# Patient Record
Sex: FEMALE | Race: BLACK OR AFRICAN AMERICAN | NOT HISPANIC OR LATINO | Employment: FULL TIME | URBAN - METROPOLITAN AREA
[De-identification: names, ages, dates, MRNs, and addresses within clinical notes are randomized per-mention and may not be internally consistent; named-entity substitution may affect disease eponyms.]

---

## 2023-03-14 DIAGNOSIS — K21.9 GASTROESOPHAGEAL REFLUX DISEASE WITHOUT ESOPHAGITIS: Primary | ICD-10-CM

## 2023-03-14 RX ORDER — PANTOPRAZOLE SODIUM 40 MG/1
TABLET, DELAYED RELEASE ORAL
COMMUNITY
End: 2023-03-14 | Stop reason: SDUPTHER

## 2023-03-14 RX ORDER — PANTOPRAZOLE SODIUM 40 MG/1
40 TABLET, DELAYED RELEASE ORAL DAILY
Qty: 90 TABLET | Refills: 0 | Status: SHIPPED | OUTPATIENT
Start: 2023-03-14 | End: 2023-06-01 | Stop reason: SDUPTHER

## 2023-03-16 ENCOUNTER — TELEMEDICINE (OUTPATIENT)
Dept: PHARMACY | Facility: HOSPITAL | Age: 53
End: 2023-03-16
Payer: COMMERCIAL

## 2023-03-16 DIAGNOSIS — E09.65: Primary | ICD-10-CM

## 2023-03-16 RX ORDER — AMLODIPINE BESYLATE 10 MG/1
10 TABLET ORAL DAILY
COMMUNITY
End: 2023-06-12 | Stop reason: ALTCHOICE

## 2023-03-16 RX ORDER — ALBUTEROL SULFATE 0.83 MG/ML
SOLUTION RESPIRATORY (INHALATION)
COMMUNITY
End: 2023-10-05 | Stop reason: SDUPTHER

## 2023-03-16 RX ORDER — BLOOD SUGAR DIAGNOSTIC
STRIP MISCELLANEOUS
COMMUNITY
Start: 2023-03-16 | End: 2024-01-09 | Stop reason: WASHOUT

## 2023-03-16 RX ORDER — ALBUTEROL SULFATE 90 UG/1
AEROSOL, METERED RESPIRATORY (INHALATION)
COMMUNITY
End: 2023-09-07 | Stop reason: SDUPTHER

## 2023-03-16 RX ORDER — MONTELUKAST SODIUM 10 MG/1
1 TABLET ORAL NIGHTLY
COMMUNITY
Start: 2022-05-23 | End: 2023-10-05 | Stop reason: SDUPTHER

## 2023-03-16 RX ORDER — INSULIN ASPART 100 [IU]/ML
40 INJECTION, SUSPENSION SUBCUTANEOUS
COMMUNITY
Start: 2023-03-16 | End: 2023-06-12 | Stop reason: ALTCHOICE

## 2023-03-16 RX ORDER — ROSUVASTATIN CALCIUM 20 MG/1
20 TABLET, COATED ORAL DAILY
COMMUNITY
End: 2023-06-01 | Stop reason: SDUPTHER

## 2023-03-16 RX ORDER — PEN NEEDLE, DIABETIC 31 GX5/16"
NEEDLE, DISPOSABLE MISCELLANEOUS
COMMUNITY
Start: 2023-03-16

## 2023-03-16 RX ORDER — LANCETS 30 GAUGE
EACH MISCELLANEOUS
COMMUNITY
Start: 2023-03-16 | End: 2024-01-23

## 2023-03-16 RX ORDER — FLUTICASONE PROPIONATE 50 MCG
1 SPRAY, SUSPENSION (ML) NASAL DAILY
COMMUNITY
Start: 2022-11-18 | End: 2023-06-12 | Stop reason: ALTCHOICE

## 2023-03-16 RX ORDER — FLUTICASONE PROPIONATE AND SALMETEROL 500; 50 UG/1; UG/1
1 POWDER RESPIRATORY (INHALATION)
COMMUNITY
Start: 2022-05-23 | End: 2023-10-05 | Stop reason: SDUPTHER

## 2023-03-16 RX ORDER — LANCETS 33 GAUGE
EACH MISCELLANEOUS
COMMUNITY
Start: 2023-03-16 | End: 2023-11-21 | Stop reason: ALTCHOICE

## 2023-03-16 NOTE — PROGRESS NOTES
"Edmond Cool is a 52 y.o. female was referred to Clinical Pharmacy Team to complete a comprehensive medication review (CMR) with a pharmacist as part of the [employer] diabetes program.  The patient was referred for their diabetes.     Referring Provider: David Skelton MD    Subjective   No Known Allergies    [facility] - [place], [place] - [address]  [address] [facility] [address]  [facility] [address]  Phone: [phone] Fax: [phone]      HPI    Objective     There were no vitals taken for this visit.     LAB  Lab Results   Component Value Date    BILITOT 0.4 03/12/2023    CALCIUM 8.3 (L) 03/16/2023    CO2 26 03/16/2023     03/16/2023    CREATININE 0.58 03/16/2023    GLUCOSE 151 (H) 03/16/2023    ALKPHOS 125 (H) 03/12/2023    K 3.9 03/16/2023    PROT 7.4 03/12/2023     03/16/2023    AST 23 03/12/2023    ALT 27 03/12/2023    BUN 13 03/16/2023    ANIONGAP 12 03/16/2023    MG 1.80 02/15/2023    PHOS 3.8 08/18/2020     (H) 03/11/2022    ALBUMIN 4.4 03/12/2023    GFRF >90 03/16/2023     Lab Results   Component Value Date    TRIG 184 (H) 12/09/2022    CHOL 229 (H) 12/09/2022    HDL 45.7 12/09/2022     Lab Results   Component Value Date    HGBA1C 10.6 (A) 03/15/2023       Current Outpatient Medications on File Prior to Visit   Medication Sig Dispense Refill    BD Ultra-Fine April Pen Needle 32 gauge x 5/32\" needle       fluticasone (Flonase) 50 mcg/actuation nasal spray Administer 1 spray into each nostril once daily.      fluticasone propion-salmeteroL (Advair Diskus) 500-50 mcg/dose diskus inhaler Inhale 1 puff  in the morning and 1 puff before bedtime.      montelukast (Singulair) 10 mg tablet Take 1 tablet (10 mg) by mouth once daily at bedtime.      NovoLOG Mix 70-30FlexPen U-100 100 unit/mL (70-30) injection Inject 40 Units under the skin in the morning and 40 Units in the evening. Inject with meals.      OneTouch Delica Plus " Lancet 33 gauge misc       OneTouch Ultra Test strip       OneTouch Ultra2 Meter misc       albuterol 2.5 mg /3 mL (0.083 %) nebulizer solution INHALE THE CONTENTS OF 1 UNIT DOSE BY NEBULIZATION ROUTE EVERY 4 TO 6 HOURS AS NEEDED FOR WHEEZE      albuterol 90 mcg/actuation inhaler INHALE 1 TO 2 PUFFS BY MOUTH EVERY 4 HOURS AS NEEDED FOR SHORTNESS OF BREATH / WHEEZING      amLODIPine (Norvasc) 10 mg tablet Take 1 tablet (10 mg) by mouth once daily.      pantoprazole (ProtoNix) 40 mg EC tablet Take 1 tablet (40 mg) by mouth once daily for 90 doses. Do not crush, chew, or split. 90 tablet 0    rosuvastatin (Crestor) 20 mg tablet Take 1 tablet (20 mg) by mouth once daily.      [DISCONTINUED] pantoprazole (ProtoNix) 40 mg EC tablet take 1 tablet by mouth every day       No current facility-administered medications on file prior to visit.      HISTORICAL PHARMACOTHERAPY  -None, patient diagnosed during inpatient visit ongoing.     DRUG INTERACTIONS  - No significant drug-drug interactions exist that require adjustment to therapy.     SECONDARY PREVENTION  - Statin? yes  - ACE-I/ARB? 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 Continue medications as prescribed.     Cam Patel, PharmD     Verbal consent to manage patient's drug therapy was obtained from [the patient and/or an individual authorized to act on behalf of a patient]. They were informed they may decline to participate or withdraw from participation in pharmacy services at any time.

## 2023-03-17 PROBLEM — G56.10 MEDIAN NERVE NEURITIS: Status: ACTIVE | Noted: 2023-03-17

## 2023-03-17 PROBLEM — J45.909 ASTHMA (HHS-HCC): Status: RESOLVED | Noted: 2023-03-17 | Resolved: 2023-03-17

## 2023-03-17 PROBLEM — R74.8 ELEVATED SERUM GAMMA-GLUTAMYL TRANSFERASE LEVEL: Status: ACTIVE | Noted: 2023-03-17

## 2023-03-17 PROBLEM — J06.9 UPPER RESPIRATORY INFECTION: Status: ACTIVE | Noted: 2023-03-17

## 2023-03-17 PROBLEM — T50.905A ADVERSE REACTION TO DRUG, INITIAL ENCOUNTER: Status: ACTIVE | Noted: 2023-03-17

## 2023-03-17 PROBLEM — R05.3 PERSISTENT COUGH: Status: ACTIVE | Noted: 2023-03-17

## 2023-03-17 PROBLEM — T63.91XA TOXIC EFFECT OF VENOM: Status: ACTIVE | Noted: 2023-03-17

## 2023-03-17 PROBLEM — R16.0 HEPATOMEGALY: Status: ACTIVE | Noted: 2023-03-17

## 2023-03-17 PROBLEM — R74.01 TRANSAMINITIS: Status: ACTIVE | Noted: 2023-03-17

## 2023-03-17 PROBLEM — N95.1 MENOPAUSAL SYMPTOMS: Status: ACTIVE | Noted: 2023-03-17

## 2023-03-17 PROBLEM — M25.561 RIGHT KNEE PAIN: Status: ACTIVE | Noted: 2023-03-17

## 2023-03-17 PROBLEM — J45.50 SEVERE PERSISTENT ASTHMA (MULTI): Status: ACTIVE | Noted: 2023-03-17

## 2023-03-17 PROBLEM — N92.6 IRREGULAR MENSTRUAL CYCLE: Status: ACTIVE | Noted: 2023-03-17

## 2023-03-17 PROBLEM — M25.811 SHOULDER IMPINGEMENT, RIGHT: Status: ACTIVE | Noted: 2023-03-17

## 2023-03-17 PROBLEM — R51.9 HEADACHE, TEMPORAL: Status: ACTIVE | Noted: 2023-03-17

## 2023-03-17 PROBLEM — F32.A ANXIETY AND DEPRESSION: Status: ACTIVE | Noted: 2023-03-17

## 2023-03-17 PROBLEM — M25.511 RIGHT SHOULDER PAIN: Status: ACTIVE | Noted: 2023-03-17

## 2023-03-17 PROBLEM — I10 BENIGN ESSENTIAL HYPERTENSION: Status: ACTIVE | Noted: 2023-03-17

## 2023-03-17 PROBLEM — K70.0 FATTY LIVER, ALCOHOLIC: Status: ACTIVE | Noted: 2023-03-17

## 2023-03-17 PROBLEM — R31.9 HEMATURIA: Status: ACTIVE | Noted: 2023-03-17

## 2023-03-17 PROBLEM — M75.81 TENDINITIS OF RIGHT ROTATOR CUFF: Status: ACTIVE | Noted: 2023-03-17

## 2023-03-17 PROBLEM — J32.9 SINUS INFECTION: Status: ACTIVE | Noted: 2023-03-17

## 2023-03-17 PROBLEM — E78.49 ESSENTIAL FAMILIAL HYPERLIPIDEMIA: Status: ACTIVE | Noted: 2023-03-17

## 2023-03-17 PROBLEM — S46.011A STRAIN OF RIGHT ROTATOR CUFF CAPSULE: Status: ACTIVE | Noted: 2023-03-17

## 2023-03-17 PROBLEM — J45.909 ASTHMA (HHS-HCC): Status: ACTIVE | Noted: 2023-03-17

## 2023-03-17 PROBLEM — E55.9 VITAMIN D DEFICIENCY: Status: ACTIVE | Noted: 2023-03-17

## 2023-03-17 PROBLEM — J30.9 ALLERGIC RHINITIS: Status: ACTIVE | Noted: 2023-03-17

## 2023-03-17 PROBLEM — B96.89 BACTERIAL SINUSITIS: Status: ACTIVE | Noted: 2023-03-17

## 2023-03-17 PROBLEM — M79.642 PAIN OF LEFT HAND: Status: ACTIVE | Noted: 2023-03-17

## 2023-03-17 PROBLEM — H52.13 MYOPIA WITH PRESBYOPIA OF BOTH EYES: Status: ACTIVE | Noted: 2023-03-17

## 2023-03-17 PROBLEM — J45.901 ASTHMATIC BRONCHITIS WITH EXACERBATION (HHS-HCC): Status: ACTIVE | Noted: 2023-03-17

## 2023-03-17 PROBLEM — J31.0 NON-ALLERGIC RHINITIS: Status: ACTIVE | Noted: 2023-03-17

## 2023-03-17 PROBLEM — M54.31 SCIATICA OF RIGHT SIDE: Status: ACTIVE | Noted: 2023-03-17

## 2023-03-17 PROBLEM — N89.8 VAGINAL DISCHARGE: Status: ACTIVE | Noted: 2023-03-17

## 2023-03-17 PROBLEM — E11.9 DIABETES MELLITUS (MULTI): Status: ACTIVE | Noted: 2023-03-17

## 2023-03-17 PROBLEM — H52.4 MYOPIA WITH PRESBYOPIA OF BOTH EYES: Status: ACTIVE | Noted: 2023-03-17

## 2023-03-17 PROBLEM — M77.8 EXTENSOR TENDINITIS OF HAND: Status: ACTIVE | Noted: 2023-03-17

## 2023-03-17 PROBLEM — H53.8 BLURRY VISION: Status: ACTIVE | Noted: 2023-03-17

## 2023-03-17 PROBLEM — F41.9 ANXIETY AND DEPRESSION: Status: ACTIVE | Noted: 2023-03-17

## 2023-03-17 PROBLEM — F10.21 HISTORY OF ALCOHOLISM (MULTI): Status: ACTIVE | Noted: 2023-03-17

## 2023-03-17 PROBLEM — M75.21 TENDINITIS OF LONG HEAD OF BICEPS BRACHII OF RIGHT SHOULDER: Status: ACTIVE | Noted: 2023-03-17

## 2023-03-17 PROBLEM — K21.9 GERD WITHOUT ESOPHAGITIS: Status: ACTIVE | Noted: 2023-03-17

## 2023-03-17 PROBLEM — R06.02 SHORTNESS OF BREATH: Status: ACTIVE | Noted: 2023-03-17

## 2023-03-17 RX ORDER — MINERAL OIL
180 ENEMA (ML) RECTAL DAILY PRN
COMMUNITY
End: 2023-04-24 | Stop reason: ALTCHOICE

## 2023-03-17 RX ORDER — AZELASTINE HYDROCHLORIDE, FLUTICASONE PROPIONATE 137; 50 UG/1; UG/1
1 SPRAY, METERED NASAL 2 TIMES DAILY
COMMUNITY
End: 2023-06-12 | Stop reason: ALTCHOICE

## 2023-03-17 RX ORDER — MULTIVIT-MIN/IRON/FOLIC ACID/K 18-600-40
1 CAPSULE ORAL DAILY
COMMUNITY
End: 2023-06-12 | Stop reason: ALTCHOICE

## 2023-03-20 NOTE — PROGRESS NOTES
I reviewed the progress note and agree with the resident’s findings and plans as written. Case discussed with resident.    John He, PharmD

## 2023-03-21 ENCOUNTER — APPOINTMENT (OUTPATIENT)
Dept: PRIMARY CARE | Facility: CLINIC | Age: 53
End: 2023-03-21
Payer: COMMERCIAL

## 2023-03-24 ENCOUNTER — OFFICE VISIT (OUTPATIENT)
Dept: PRIMARY CARE | Facility: CLINIC | Age: 53
End: 2023-03-24
Payer: COMMERCIAL

## 2023-03-24 VITALS
WEIGHT: 163.6 LBS | HEART RATE: 79 BPM | DIASTOLIC BLOOD PRESSURE: 82 MMHG | HEIGHT: 61 IN | SYSTOLIC BLOOD PRESSURE: 127 MMHG | BODY MASS INDEX: 30.89 KG/M2

## 2023-03-24 DIAGNOSIS — E11.9 TYPE 2 DIABETES MELLITUS WITHOUT COMPLICATION, WITHOUT LONG-TERM CURRENT USE OF INSULIN (MULTI): ICD-10-CM

## 2023-03-24 DIAGNOSIS — H10.33 ACUTE CONJUNCTIVITIS OF BOTH EYES, UNSPECIFIED ACUTE CONJUNCTIVITIS TYPE: Primary | ICD-10-CM

## 2023-03-24 DIAGNOSIS — I10 ESSENTIAL HYPERTENSION, BENIGN: ICD-10-CM

## 2023-03-24 PROCEDURE — 3008F BODY MASS INDEX DOCD: CPT | Performed by: FAMILY MEDICINE

## 2023-03-24 PROCEDURE — 3074F SYST BP LT 130 MM HG: CPT | Performed by: FAMILY MEDICINE

## 2023-03-24 PROCEDURE — 3079F DIAST BP 80-89 MM HG: CPT | Performed by: FAMILY MEDICINE

## 2023-03-24 PROCEDURE — 3046F HEMOGLOBIN A1C LEVEL >9.0%: CPT | Performed by: FAMILY MEDICINE

## 2023-03-24 PROCEDURE — 1036F TOBACCO NON-USER: CPT | Performed by: FAMILY MEDICINE

## 2023-03-24 PROCEDURE — 99214 OFFICE O/P EST MOD 30 MIN: CPT | Performed by: FAMILY MEDICINE

## 2023-03-24 RX ORDER — TOBRAMYCIN AND DEXAMETHASONE 3; 1 MG/ML; MG/ML
1 SUSPENSION/ DROPS OPHTHALMIC
Qty: 5 ML | Refills: 0 | Status: SHIPPED | OUTPATIENT
Start: 2023-03-24 | End: 2023-04-03

## 2023-03-24 ASSESSMENT — ENCOUNTER SYMPTOMS
PHOTOPHOBIA: 1
RESPIRATORY NEGATIVE: 1
NEUROLOGICAL NEGATIVE: 1
GASTROINTESTINAL NEGATIVE: 1
EYE ITCHING: 1
CARDIOVASCULAR NEGATIVE: 1
EYE REDNESS: 1
MUSCULOSKELETAL NEGATIVE: 1
EYE PAIN: 1
CONSTITUTIONAL NEGATIVE: 1

## 2023-03-24 NOTE — PROGRESS NOTES
Pt is here for hospital follow up 2/15/23  Then again 3/12-3/18   Pt had double pneumonia   Both eyes are red and swollen.

## 2023-04-24 ENCOUNTER — OFFICE VISIT (OUTPATIENT)
Dept: PRIMARY CARE | Facility: CLINIC | Age: 53
End: 2023-04-24
Payer: COMMERCIAL

## 2023-04-24 VITALS
WEIGHT: 158 LBS | BODY MASS INDEX: 29.83 KG/M2 | SYSTOLIC BLOOD PRESSURE: 117 MMHG | HEIGHT: 61 IN | DIASTOLIC BLOOD PRESSURE: 78 MMHG | HEART RATE: 90 BPM

## 2023-04-24 DIAGNOSIS — E11.9 TYPE 2 DIABETES MELLITUS WITHOUT COMPLICATION, WITHOUT LONG-TERM CURRENT USE OF INSULIN (MULTI): ICD-10-CM

## 2023-04-24 DIAGNOSIS — E78.2 MIXED HYPERLIPIDEMIA: ICD-10-CM

## 2023-04-24 DIAGNOSIS — I10 BENIGN ESSENTIAL HYPERTENSION: Primary | ICD-10-CM

## 2023-04-24 PROBLEM — R31.9 HEMATURIA: Status: RESOLVED | Noted: 2023-03-17 | Resolved: 2023-04-24

## 2023-04-24 PROBLEM — B96.89 BACTERIAL SINUSITIS: Status: RESOLVED | Noted: 2023-03-17 | Resolved: 2023-04-24

## 2023-04-24 PROBLEM — R06.2 WHEEZING: Status: ACTIVE | Noted: 2023-02-15

## 2023-04-24 PROBLEM — E66.9 OBESITY, UNSPECIFIED: Status: ACTIVE | Noted: 2023-03-18

## 2023-04-24 PROBLEM — E78.5 HYPERLIPIDEMIA: Status: ACTIVE | Noted: 2023-03-18

## 2023-04-24 PROBLEM — J06.9 UPPER RESPIRATORY INFECTION: Status: RESOLVED | Noted: 2023-03-17 | Resolved: 2023-04-24

## 2023-04-24 PROBLEM — R42 VERTIGO: Status: ACTIVE | Noted: 2017-07-06

## 2023-04-24 PROBLEM — G47.00 INSOMNIA: Status: ACTIVE | Noted: 2019-03-24

## 2023-04-24 PROBLEM — J45.901 PERSISTENT ASTHMA WITH ACUTE EXACERBATION (HHS-HCC): Status: ACTIVE | Noted: 2023-03-01

## 2023-04-24 PROBLEM — M51.26 OTHER INTERVERTEBRAL DISC DISPLACEMENT, LUMBAR REGION: Status: ACTIVE | Noted: 2023-03-18

## 2023-04-24 PROBLEM — I71.40 ABDOMINAL AORTIC ANEURYSM (CMS-HCC): Status: ACTIVE | Noted: 2023-04-24

## 2023-04-24 PROBLEM — J32.9 BACTERIAL SINUSITIS: Status: RESOLVED | Noted: 2023-03-17 | Resolved: 2023-04-24

## 2023-04-24 PROBLEM — N89.8 VAGINAL DISCHARGE: Status: RESOLVED | Noted: 2023-03-17 | Resolved: 2023-04-24

## 2023-04-24 PROCEDURE — 1036F TOBACCO NON-USER: CPT | Performed by: FAMILY MEDICINE

## 2023-04-24 PROCEDURE — 3046F HEMOGLOBIN A1C LEVEL >9.0%: CPT | Performed by: FAMILY MEDICINE

## 2023-04-24 PROCEDURE — 99214 OFFICE O/P EST MOD 30 MIN: CPT | Performed by: FAMILY MEDICINE

## 2023-04-24 PROCEDURE — 3074F SYST BP LT 130 MM HG: CPT | Performed by: FAMILY MEDICINE

## 2023-04-24 PROCEDURE — 3078F DIAST BP <80 MM HG: CPT | Performed by: FAMILY MEDICINE

## 2023-04-24 PROCEDURE — 3008F BODY MASS INDEX DOCD: CPT | Performed by: FAMILY MEDICINE

## 2023-04-24 RX ORDER — INSULIN DEGLUDEC 100 U/ML
INJECTION, SOLUTION SUBCUTANEOUS
COMMUNITY
End: 2023-06-12 | Stop reason: ALTCHOICE

## 2023-04-24 RX ORDER — EMPAGLIFLOZIN 10 MG/1
TABLET, FILM COATED ORAL
COMMUNITY
End: 2023-06-12 | Stop reason: ALTCHOICE

## 2023-04-24 RX ORDER — SEMAGLUTIDE 0.68 MG/ML
INJECTION, SOLUTION SUBCUTANEOUS
COMMUNITY
End: 2023-11-17 | Stop reason: SDUPTHER

## 2023-04-24 ASSESSMENT — ENCOUNTER SYMPTOMS
CARDIOVASCULAR NEGATIVE: 1
CONSTITUTIONAL NEGATIVE: 1
NEUROLOGICAL NEGATIVE: 1
MUSCULOSKELETAL NEGATIVE: 1
GASTROINTESTINAL NEGATIVE: 1
RESPIRATORY NEGATIVE: 1

## 2023-04-24 NOTE — PROGRESS NOTES
Pt comes in for 1 mo fu on bp. Pt has been off her bp meds for about a week now. Pt also states her endo is going to monitor her cholesterol levels.

## 2023-04-24 NOTE — PROGRESS NOTES
"Subjective   Patient ID: Edmond Cool is a 52 y.o. female who presents for No chief complaint on file..    HPI fu htn, dm, chol,, iss seeing endocrine and sugars very good    Review of Systems   Constitutional: Negative.    HENT: Negative.     Respiratory: Negative.     Cardiovascular: Negative.    Gastrointestinal: Negative.    Musculoskeletal: Negative.    Neurological: Negative.        Objective   /78   Pulse 90   Ht 1.549 m (5' 1\")   Wt 71.7 kg (158 lb)   BMI 29.85 kg/m²     Physical Exam  Vitals reviewed.   Constitutional:       Appearance: Normal appearance. She is normal weight.   Eyes:      Extraocular Movements: Extraocular movements intact.      Conjunctiva/sclera: Conjunctivae normal.      Pupils: Pupils are equal, round, and reactive to light.   Cardiovascular:      Rate and Rhythm: Normal rate and regular rhythm.      Pulses: Normal pulses.      Heart sounds: Normal heart sounds.   Pulmonary:      Effort: Pulmonary effort is normal.      Breath sounds: Normal breath sounds.   Abdominal:      General: Bowel sounds are normal.      Palpations: Abdomen is soft.   Musculoskeletal:         General: Normal range of motion.   Skin:     General: Skin is warm and dry.   Neurological:      General: No focal deficit present.      Mental Status: She is alert and oriented to person, place, and time. Mental status is at baseline.         Assessment/Plan   Problem List Items Addressed This Visit          Circulatory    Benign essential hypertension - Primary       Endocrine/Metabolic    Diabetes mellitus (CMS/HCC)       Other    Hyperlipidemia          "

## 2023-04-26 LAB
ALBUMIN (MG/L) IN URINE: <7 MG/L
ALBUMIN/CREATININE (UG/MG) IN URINE: NORMAL UG/MG CRT (ref 0–30)
CREATININE (MG/DL) IN URINE: 59.9 MG/DL (ref 20–320)

## 2023-05-09 NOTE — PROGRESS NOTES
"Subjective   Patient ID: Edmond Cool is a 52 y.o. female who presents for No chief complaint on file..    HPI fu hosp for pneumonia and dx w diabetes new onset a1c 10.6 pt sent home on 70/30 insulin 30am and 30 pm    Review of Systems   Constitutional: Negative.    HENT: Negative.     Eyes:  Positive for photophobia, pain, redness and itching.   Respiratory: Negative.     Cardiovascular: Negative.    Gastrointestinal: Negative.    Musculoskeletal: Negative.    Neurological: Negative.      Having low bs in night time, bilat conj inj and purtitis w no decrease in vision , breathin g is imp  Objective   /82   Pulse 79   Ht 1.549 m (5' 1\")   Wt 74.2 kg (163 lb 9.6 oz)   BMI 30.91 kg/m²     Physical Exam  Vitals reviewed.   Constitutional:       Appearance: Normal appearance. She is normal weight.   Eyes:      Extraocular Movements: Extraocular movements intact.      Pupils: Pupils are equal, round, and reactive to light.      Comments: Bilat eye erythema and watery discharge, intact pupillary reflex and eomi   Cardiovascular:      Rate and Rhythm: Normal rate and regular rhythm.      Pulses: Normal pulses.      Heart sounds: Normal heart sounds.   Pulmonary:      Effort: Pulmonary effort is normal.      Breath sounds: Normal breath sounds.   Abdominal:      General: Bowel sounds are normal.      Palpations: Abdomen is soft.   Musculoskeletal:         General: Normal range of motion.   Skin:     General: Skin is warm and dry.   Neurological:      General: No focal deficit present.      Mental Status: She is alert and oriented to person, place, and time. Mental status is at baseline.         Assessment/Plan   Problem List Items Addressed This Visit          Endocrine/Metabolic    Diabetes mellitus (CMS/[facility])     Other Visit Diagnoses       Acute conjunctivitis of both eyes, unspecified acute conjunctivitis type    -  Primary    Relevant Medications    tobramycin-dexamethasone (Tobradex) ophthalmic " Spoke to pt. Advised Dr Carty would like pt to follow up for imaging review and clinical breast exam. Pt has been scheduled 6/02 at 9:30am.    suspension    Essential hypertension, benign              Dm will decrease pm dose from 30 to 20 and call w sugars over weekend,   Will be getting in w endo for eval   Conj inj start medication and call w symptoms on Monday , if any change in visual clarity or fever chills and disch will need to go to er

## 2023-06-01 DIAGNOSIS — K21.9 GASTROESOPHAGEAL REFLUX DISEASE WITHOUT ESOPHAGITIS: ICD-10-CM

## 2023-06-01 RX ORDER — PANTOPRAZOLE SODIUM 40 MG/1
40 TABLET, DELAYED RELEASE ORAL DAILY
Qty: 90 TABLET | Refills: 0 | Status: SHIPPED | OUTPATIENT
Start: 2023-06-01 | End: 2023-09-07 | Stop reason: SDUPTHER

## 2023-06-01 RX ORDER — ROSUVASTATIN CALCIUM 20 MG/1
20 TABLET, COATED ORAL DAILY
Qty: 90 TABLET | Refills: 0 | Status: SHIPPED | OUTPATIENT
Start: 2023-06-01 | End: 2023-06-26

## 2023-06-12 ENCOUNTER — OFFICE VISIT (OUTPATIENT)
Dept: PRIMARY CARE | Facility: CLINIC | Age: 53
End: 2023-06-12
Payer: COMMERCIAL

## 2023-06-12 VITALS
HEART RATE: 75 BPM | WEIGHT: 150 LBS | SYSTOLIC BLOOD PRESSURE: 100 MMHG | BODY MASS INDEX: 28.32 KG/M2 | HEIGHT: 61 IN | DIASTOLIC BLOOD PRESSURE: 68 MMHG

## 2023-06-12 DIAGNOSIS — K59.04 CHRONIC IDIOPATHIC CONSTIPATION: ICD-10-CM

## 2023-06-12 DIAGNOSIS — E11.69 TYPE 2 DIABETES MELLITUS WITH OTHER SPECIFIED COMPLICATION, UNSPECIFIED WHETHER LONG TERM INSULIN USE (MULTI): ICD-10-CM

## 2023-06-12 DIAGNOSIS — H81.93 VESTIBULAR DYSFUNCTION OF BOTH EARS: ICD-10-CM

## 2023-06-12 DIAGNOSIS — Z12.11 COLON CANCER SCREENING: Primary | ICD-10-CM

## 2023-06-12 PROBLEM — E11.65 UNCONTROLLED TYPE 2 DIABETES MELLITUS WITH HYPERGLYCEMIA, WITHOUT LONG-TERM CURRENT USE OF INSULIN (MULTI): Status: ACTIVE | Noted: 2023-06-12

## 2023-06-12 LAB — POC HEMOGLOBIN A1C: 6 % (ref 4.2–6.5)

## 2023-06-12 PROCEDURE — 1036F TOBACCO NON-USER: CPT | Performed by: FAMILY MEDICINE

## 2023-06-12 PROCEDURE — 3046F HEMOGLOBIN A1C LEVEL >9.0%: CPT | Performed by: FAMILY MEDICINE

## 2023-06-12 PROCEDURE — 3074F SYST BP LT 130 MM HG: CPT | Performed by: FAMILY MEDICINE

## 2023-06-12 PROCEDURE — 3078F DIAST BP <80 MM HG: CPT | Performed by: FAMILY MEDICINE

## 2023-06-12 PROCEDURE — 99214 OFFICE O/P EST MOD 30 MIN: CPT | Performed by: FAMILY MEDICINE

## 2023-06-12 PROCEDURE — 3008F BODY MASS INDEX DOCD: CPT | Performed by: FAMILY MEDICINE

## 2023-06-12 PROCEDURE — 83036 HEMOGLOBIN GLYCOSYLATED A1C: CPT | Performed by: FAMILY MEDICINE

## 2023-06-12 RX ORDER — ONDANSETRON 8 MG/1
8 TABLET, ORALLY DISINTEGRATING ORAL EVERY 8 HOURS PRN
Qty: 20 TABLET | Refills: 0 | Status: SHIPPED | OUTPATIENT
Start: 2023-06-12 | End: 2023-06-19

## 2023-06-12 RX ORDER — MECLIZINE HYDROCHLORIDE 25 MG/1
25 TABLET ORAL 3 TIMES DAILY PRN
Qty: 30 TABLET | Refills: 0 | Status: SHIPPED | OUTPATIENT
Start: 2023-06-12 | End: 2023-09-07 | Stop reason: ALTCHOICE

## 2023-06-12 ASSESSMENT — ENCOUNTER SYMPTOMS
NEUROLOGICAL NEGATIVE: 1
CONSTITUTIONAL NEGATIVE: 1
CARDIOVASCULAR NEGATIVE: 1
MUSCULOSKELETAL NEGATIVE: 1
GASTROINTESTINAL NEGATIVE: 1
RESPIRATORY NEGATIVE: 1

## 2023-06-12 NOTE — PROGRESS NOTES
Pt comes in for constipation issues. Pt has tried otc stuff, prune juice, laxatives, senna, and colace and not working. Pt also goes to the gym 5 days a week and when she lays down she is getting nauseated and feels sick. Pt states it is a different feeling then vertigo.

## 2023-06-12 NOTE — PROGRESS NOTES
"Subjective   Patient ID: Edmond Cool is a 52 y.o. female who presents for No chief complaint on file..    HPI constipation, vestibular dysequilibrium, dm     Review of Systems   Constitutional: Negative.    HENT: Negative.     Respiratory: Negative.     Cardiovascular: Negative.    Gastrointestinal: Negative.    Musculoskeletal: Negative.    Neurological: Negative.        Objective   /68   Pulse 75   Ht 1.549 m (5' 1\")   Wt 68 kg (150 lb)   BMI 28.34 kg/m²     Physical Exam  Vitals reviewed.   Constitutional:       Appearance: Normal appearance. She is normal weight.   Eyes:      Extraocular Movements: Extraocular movements intact.      Conjunctiva/sclera: Conjunctivae normal.      Pupils: Pupils are equal, round, and reactive to light.   Cardiovascular:      Rate and Rhythm: Normal rate and regular rhythm.      Pulses: Normal pulses.      Heart sounds: Normal heart sounds.   Pulmonary:      Effort: Pulmonary effort is normal.      Breath sounds: Normal breath sounds.   Abdominal:      General: Bowel sounds are normal.      Palpations: Abdomen is soft.   Musculoskeletal:         General: Normal range of motion.   Skin:     General: Skin is warm and dry.   Neurological:      General: No focal deficit present.      Mental Status: She is alert and oriented to person, place, and time. Mental status is at baseline.         Assessment/Plan   Problem List Items Addressed This Visit          Endocrine/Metabolic    Diabetes mellitus (CMS/HCC)     Other Visit Diagnoses       Colon cancer screening    -  Primary    Chronic idiopathic constipation                   "

## 2023-07-19 DIAGNOSIS — K59.04 CHRONIC IDIOPATHIC CONSTIPATION: Primary | ICD-10-CM

## 2023-07-24 DIAGNOSIS — K59.04 CHRONIC IDIOPATHIC CONSTIPATION: Primary | ICD-10-CM

## 2023-08-01 DIAGNOSIS — K59.04 CHRONIC IDIOPATHIC CONSTIPATION: ICD-10-CM

## 2023-08-24 ENCOUNTER — APPOINTMENT (OUTPATIENT)
Dept: PRIMARY CARE | Facility: CLINIC | Age: 53
End: 2023-08-24
Payer: COMMERCIAL

## 2023-08-25 ENCOUNTER — APPOINTMENT (OUTPATIENT)
Dept: PRIMARY CARE | Facility: CLINIC | Age: 53
End: 2023-08-25
Payer: COMMERCIAL

## 2023-08-30 ENCOUNTER — LAB (OUTPATIENT)
Dept: LAB | Facility: LAB | Age: 53
End: 2023-08-30
Payer: COMMERCIAL

## 2023-08-30 ENCOUNTER — APPOINTMENT (OUTPATIENT)
Dept: LAB | Facility: LAB | Age: 53
End: 2023-08-30
Payer: COMMERCIAL

## 2023-09-07 ENCOUNTER — OFFICE VISIT (OUTPATIENT)
Dept: PRIMARY CARE | Facility: CLINIC | Age: 53
End: 2023-09-07
Payer: COMMERCIAL

## 2023-09-07 VITALS
BODY MASS INDEX: 28.13 KG/M2 | HEIGHT: 61 IN | DIASTOLIC BLOOD PRESSURE: 78 MMHG | SYSTOLIC BLOOD PRESSURE: 130 MMHG | WEIGHT: 149 LBS | HEART RATE: 76 BPM

## 2023-09-07 DIAGNOSIS — M62.838 CERVICAL PARASPINAL MUSCLE SPASM: Primary | ICD-10-CM

## 2023-09-07 DIAGNOSIS — J45.21 MILD INTERMITTENT ASTHMATIC BRONCHITIS WITH ACUTE EXACERBATION (HHS-HCC): ICD-10-CM

## 2023-09-07 DIAGNOSIS — M54.2 NECK PAIN, CHRONIC: ICD-10-CM

## 2023-09-07 DIAGNOSIS — K21.9 GASTROESOPHAGEAL REFLUX DISEASE WITHOUT ESOPHAGITIS: ICD-10-CM

## 2023-09-07 DIAGNOSIS — G89.29 NECK PAIN, CHRONIC: ICD-10-CM

## 2023-09-07 PROBLEM — Z86.39 HISTORY OF HYPERCHOLESTEROLEMIA: Status: ACTIVE | Noted: 2023-09-07

## 2023-09-07 PROBLEM — K59.09 CHRONIC CONSTIPATION: Status: ACTIVE | Noted: 2023-09-07

## 2023-09-07 PROCEDURE — 99213 OFFICE O/P EST LOW 20 MIN: CPT | Performed by: FAMILY MEDICINE

## 2023-09-07 PROCEDURE — 3044F HG A1C LEVEL LT 7.0%: CPT | Performed by: FAMILY MEDICINE

## 2023-09-07 PROCEDURE — 3075F SYST BP GE 130 - 139MM HG: CPT | Performed by: FAMILY MEDICINE

## 2023-09-07 PROCEDURE — 3008F BODY MASS INDEX DOCD: CPT | Performed by: FAMILY MEDICINE

## 2023-09-07 PROCEDURE — 3078F DIAST BP <80 MM HG: CPT | Performed by: FAMILY MEDICINE

## 2023-09-07 PROCEDURE — 1036F TOBACCO NON-USER: CPT | Performed by: FAMILY MEDICINE

## 2023-09-07 RX ORDER — PANTOPRAZOLE SODIUM 40 MG/1
40 TABLET, DELAYED RELEASE ORAL DAILY
Qty: 90 TABLET | Refills: 0 | Status: SHIPPED | OUTPATIENT
Start: 2023-09-07 | End: 2023-09-07

## 2023-09-07 RX ORDER — ROSUVASTATIN CALCIUM 20 MG/1
20 TABLET, COATED ORAL DAILY
Qty: 90 TABLET | Refills: 1 | Status: SHIPPED | OUTPATIENT
Start: 2023-09-07 | End: 2023-09-07

## 2023-09-07 RX ORDER — TIZANIDINE HYDROCHLORIDE 4 MG/1
4 CAPSULE, GELATIN COATED ORAL NIGHTLY PRN
Qty: 10 CAPSULE | Refills: 0 | Status: SHIPPED | OUTPATIENT
Start: 2023-09-07 | End: 2023-09-07

## 2023-09-07 RX ORDER — PREDNISONE 20 MG/1
20 TABLET ORAL 2 TIMES DAILY
Qty: 10 TABLET | Refills: 0 | Status: SHIPPED | OUTPATIENT
Start: 2023-09-07 | End: 2023-09-07

## 2023-09-07 RX ORDER — ALBUTEROL SULFATE 90 UG/1
2 AEROSOL, METERED RESPIRATORY (INHALATION) EVERY 4 HOURS PRN
Qty: 18 G | Refills: 2 | Status: SHIPPED | OUTPATIENT
Start: 2023-09-07 | End: 2023-09-07

## 2023-09-07 RX ORDER — ISOPROPYL ALCOHOL 70 ML/100ML
SWAB TOPICAL
COMMUNITY
End: 2023-11-21 | Stop reason: WASHOUT

## 2023-09-07 ASSESSMENT — ENCOUNTER SYMPTOMS
NEUROLOGICAL NEGATIVE: 1
MUSCULOSKELETAL NEGATIVE: 1
RESPIRATORY NEGATIVE: 1
CARDIOVASCULAR NEGATIVE: 1
GASTROINTESTINAL NEGATIVE: 1
CONSTITUTIONAL NEGATIVE: 1

## 2023-09-07 NOTE — PROGRESS NOTES
Pt comes in for fu. Pt just saw her endo in July and will go back in December. Pt has some shoulder pain and got a luis a shot yesterday from ortho. Pt was at the urgent care a couple weeks ago for sinus inf and finished antibiotics.

## 2023-09-07 NOTE — PROGRESS NOTES
"Subjective   Patient ID: Edmond Cool is a 52 y.o. female who presents for No chief complaint on file..    HPI cervicle disc dis    Review of Systems   Constitutional: Negative.    HENT: Negative.     Respiratory: Negative.     Cardiovascular: Negative.    Gastrointestinal: Negative.    Musculoskeletal: Negative.    Neurological: Negative.        Objective   /78   Pulse 76   Ht 1.549 m (5' 1\")   Wt 67.6 kg (149 lb)   BMI 28.15 kg/m²     Physical Exam  Vitals reviewed.   Constitutional:       Appearance: Normal appearance. She is normal weight.   Eyes:      Extraocular Movements: Extraocular movements intact.      Conjunctiva/sclera: Conjunctivae normal.      Pupils: Pupils are equal, round, and reactive to light.   Cardiovascular:      Rate and Rhythm: Normal rate and regular rhythm.      Pulses: Normal pulses.      Heart sounds: Normal heart sounds.   Pulmonary:      Effort: Pulmonary effort is normal.      Breath sounds: Normal breath sounds.   Abdominal:      General: Bowel sounds are normal.      Palpations: Abdomen is soft.   Musculoskeletal:         General: Normal range of motion.   Skin:     General: Skin is warm and dry.   Neurological:      General: No focal deficit present.      Mental Status: She is alert and oriented to person, place, and time. Mental status is at baseline.         Assessment/Plan   Problem List Items Addressed This Visit       Asthmatic bronchitis with exacerbation    Relevant Medications    albuterol 90 mcg/actuation inhaler     Other Visit Diagnoses       Cervical paraspinal muscle spasm    -  Primary    Relevant Medications    predniSONE (Deltasone) 20 mg tablet    tiZANidine (Zanaflex) 4 mg capsule    Gastroesophageal reflux disease without esophagitis        Relevant Medications    pantoprazole (ProtoNix) 40 mg EC tablet    rosuvastatin (Crestor) 20 mg tablet    Neck pain, chronic                   "

## 2023-09-11 ENCOUNTER — TELEPHONE (OUTPATIENT)
Dept: PRIMARY CARE | Facility: CLINIC | Age: 53
End: 2023-09-11
Payer: COMMERCIAL

## 2023-09-20 LAB — SARS-COV-2 RESULT: DETECTED

## 2023-10-05 ENCOUNTER — PHARMACY VISIT (OUTPATIENT)
Dept: PHARMACY | Facility: CLINIC | Age: 53
End: 2023-10-05
Payer: COMMERCIAL

## 2023-10-05 ENCOUNTER — OFFICE VISIT (OUTPATIENT)
Dept: PULMONOLOGY | Facility: CLINIC | Age: 53
End: 2023-10-05
Payer: COMMERCIAL

## 2023-10-05 VITALS
RESPIRATION RATE: 20 BRPM | DIASTOLIC BLOOD PRESSURE: 95 MMHG | BODY MASS INDEX: 27.74 KG/M2 | SYSTOLIC BLOOD PRESSURE: 161 MMHG | WEIGHT: 146.8 LBS | OXYGEN SATURATION: 100 % | HEART RATE: 70 BPM | TEMPERATURE: 97.6 F

## 2023-10-05 DIAGNOSIS — R05.3 PERSISTENT COUGH: ICD-10-CM

## 2023-10-05 DIAGNOSIS — J45.41 MODERATE PERSISTENT ASTHMATIC BRONCHITIS WITH ACUTE EXACERBATION (HHS-HCC): ICD-10-CM

## 2023-10-05 DIAGNOSIS — J30.9 ALLERGIC RHINITIS, UNSPECIFIED SEASONALITY, UNSPECIFIED TRIGGER: Primary | ICD-10-CM

## 2023-10-05 PROCEDURE — 3080F DIAST BP >= 90 MM HG: CPT | Performed by: STUDENT IN AN ORGANIZED HEALTH CARE EDUCATION/TRAINING PROGRAM

## 2023-10-05 PROCEDURE — 3077F SYST BP >= 140 MM HG: CPT | Performed by: STUDENT IN AN ORGANIZED HEALTH CARE EDUCATION/TRAINING PROGRAM

## 2023-10-05 PROCEDURE — 1036F TOBACCO NON-USER: CPT | Performed by: STUDENT IN AN ORGANIZED HEALTH CARE EDUCATION/TRAINING PROGRAM

## 2023-10-05 PROCEDURE — RXMED WILLOW AMBULATORY MEDICATION CHARGE

## 2023-10-05 PROCEDURE — 99214 OFFICE O/P EST MOD 30 MIN: CPT | Performed by: STUDENT IN AN ORGANIZED HEALTH CARE EDUCATION/TRAINING PROGRAM

## 2023-10-05 PROCEDURE — 3044F HG A1C LEVEL LT 7.0%: CPT | Performed by: STUDENT IN AN ORGANIZED HEALTH CARE EDUCATION/TRAINING PROGRAM

## 2023-10-05 PROCEDURE — 3008F BODY MASS INDEX DOCD: CPT | Performed by: STUDENT IN AN ORGANIZED HEALTH CARE EDUCATION/TRAINING PROGRAM

## 2023-10-05 RX ORDER — FLUTICASONE PROPIONATE AND SALMETEROL 500; 50 UG/1; UG/1
1 POWDER RESPIRATORY (INHALATION)
Qty: 60 EACH | Refills: 3 | Status: SHIPPED | OUTPATIENT
Start: 2023-10-05

## 2023-10-05 RX ORDER — ALBUTEROL SULFATE 0.83 MG/ML
2.5 SOLUTION RESPIRATORY (INHALATION) EVERY 6 HOURS PRN
Qty: 1080 ML | Refills: 3 | Status: SHIPPED | OUTPATIENT
Start: 2023-10-05

## 2023-10-05 RX ORDER — MONTELUKAST SODIUM 10 MG/1
10 TABLET ORAL NIGHTLY
Qty: 90 TABLET | Refills: 3 | Status: SHIPPED | OUTPATIENT
Start: 2023-10-05

## 2023-10-05 RX ORDER — FLUTICASONE PROPIONATE 50 MCG
SPRAY, SUSPENSION (ML) NASAL
Qty: 16 G | Refills: 3 | Status: SHIPPED | OUTPATIENT
Start: 2023-10-05 | End: 2024-10-04

## 2023-10-05 NOTE — PROGRESS NOTES
Patient is here for a follow up visit    Interval HPI:  Had COVID  last 2 weeks ago  Was given Paxlovid    Interval labs and radiology:  none    ROS: (POSITIVE ROS in UPPER CASE)  Constitutional symptoms: No unexplained weight loss, night sweats, fatigue/malaise/lethargy, loss of appetite, fever  Eyes: no visual changes   Ears, nose, mouth, and throat (ENT): No runny nose, nose bleeds,ear pain   Cardiovascular: No chest pain, loss of consciousness, claudication    Respiratory: COUGH, sputum, wheeze, hemoptysis    Gastrointestinal: No abdominal pain, nausea/vomiting, diarrhea/CONSTIPATION  Genitourinary: No dysuria, hematuria   Musculoskeletal: No stiffness, joint swelling   Neurological: No headache, numbness, limb weakness, poor balance    Endocrine: No polydipsia, polyuria   Hematologic: No purpura, petechia, prolonged or excessive bleeding      Vitals reviewed :see details in note  BP (!) 161/95   Pulse 70   Temp 36.4 °C (97.6 °F)   Resp 20   Wt 66.6 kg (146 lb 12.8 oz)   SpO2 100%   BMI 27.74 kg/m²       Physical exam:  Constitutional: General appearance normal  Head: Normocephalic  Neck: neck supple, no lymphadenopathy  ENT: External inspection of ears and nose normal, moist mucus membranes, no thrush  Pulmonary: MILD AUDIBLE WHEEZING BILATERALLY  Cardiac: Normal S1 S2, no murmurs, rubs, gallops  GI: no abdominal distension, bowel sounds normal  Musculoskeletal: no swollen joints  Extremities: No lower extremity edema present.  Neuro: alert and oriented, no focal deficits, normal cognition  Skin: no erythema nodosum, skin lesions/swellings on extremities    === 05/30/23 ===    CT CHEST WO IV CONTRAST    - Impression -  1. No significant interval change in the lungs compared to CT  03/13/2023, as described above. Findings may represent sequela of  prior infectious/inflammatory process such as reported recent COVID  pneumonia requiring hospitalization. Small airways disease and  atypical infectious  processes remain differential considerations. A  component of organizing pneumonia is again not excluded.  2. Stable borderline and mildly enlarged mediastinal and upper  abdominal lymph nodes, likely reactive.  3. Small sliding hiatal hernia.  4. Stable 3 mm nodule in the right upper lobe.    I personally reviewed the images/study and I agree with the findings  as stated by resident physician Dr. Daniel Rivas.      Assessment and Plan:  --------------------------    52-year old AA female (ex smoker, quit 22 years, <5 pack years), h/o asthma (since age 25), htn, hypercholesteremia, here for evaluation of asthma symptoms that have been uncontrolled of late.  Patient experience shortness of breath and cough constantly. She had covid about few months ago, had to be hospitalized for 1 day.  Has been on steroids on and off for about once a month. She has relief from the steroids.    1) severe persistent asthma -   Continue albuterol nebs twice daily  And up to to times as needed.  SEVERE persistent asthma/ post infectious cough (recent COVID infection)  Stop budesonide nebs, but may need to restart if patient is unable to get Advair  Continue Advair 500/50 1 puff twice daily. Rinse and gargle after each use  Ige levels >500 -->323  Likely need asthma biologics if symptoms are not controlled.   Patient to follow up with Dr Madrigal, her allergist.  Use albuterol as needed  Patient was prescribed prednisone 20 mg tablets x 30 to use in the case of an emergency/severe asthma flare.  Continue monteleukast    2) Bilateral interstitial infiltrates - CT scan of the chest showed bilateral mild bronchial wall thickening and mucus plugging, minimal air trapping 3 mm RUL lung nodule. Patchy lobular bandlike areas of opacity in bilateral lungs.  Repeat CT shows persistent patchy infiltrates, likely residual from prior COVID.    Discussed bronchoscopy however patient is feeling very well and no likely benefit from the procedure at  this time    2) Allergic rhinitis and post nasal drip  - Continue Flonase 1 sq in each nostril twice daily  - Continue to follow up with allergy.    RTC in 6 months      Follow-up with a pulmonologist in 6 months. Patient was made aware that I would not be seeing her in follow up, as my outpatient duties at [facility] was coming to an end. She was given recommendations on who she  could see next year.      Dylan Fowler MD  10/5/2023

## 2023-10-09 ENCOUNTER — SPECIALTY PHARMACY (OUTPATIENT)
Dept: PHARMACY | Facility: CLINIC | Age: 53
End: 2023-10-09

## 2023-10-09 ENCOUNTER — PHARMACY VISIT (OUTPATIENT)
Dept: PHARMACY | Facility: CLINIC | Age: 53
End: 2023-10-09
Payer: COMMERCIAL

## 2023-10-17 ENCOUNTER — PHARMACY VISIT (OUTPATIENT)
Dept: PHARMACY | Facility: CLINIC | Age: 53
End: 2023-10-17
Payer: COMMERCIAL

## 2023-10-17 PROCEDURE — RXMED WILLOW AMBULATORY MEDICATION CHARGE

## 2023-10-26 ENCOUNTER — PHARMACY VISIT (OUTPATIENT)
Dept: PHARMACY | Facility: CLINIC | Age: 53
End: 2023-10-26
Payer: COMMERCIAL

## 2023-10-26 PROCEDURE — RXMED WILLOW AMBULATORY MEDICATION CHARGE

## 2023-10-26 RX ORDER — INFLUENZA VIRUS VACCINE 15; 15; 15; 15 UG/.5ML; UG/.5ML; UG/.5ML; UG/.5ML
SUSPENSION INTRAMUSCULAR
Qty: 0.5 ML | Refills: 0 | OUTPATIENT
Start: 2023-10-26 | End: 2024-01-09 | Stop reason: WASHOUT

## 2023-10-28 ENCOUNTER — PHARMACY VISIT (OUTPATIENT)
Dept: PHARMACY | Facility: CLINIC | Age: 53
End: 2023-10-28
Payer: COMMERCIAL

## 2023-10-28 PROCEDURE — RXMED WILLOW AMBULATORY MEDICATION CHARGE

## 2023-10-30 ENCOUNTER — PHARMACY VISIT (OUTPATIENT)
Dept: PHARMACY | Facility: CLINIC | Age: 53
End: 2023-10-30
Payer: COMMERCIAL

## 2023-10-30 DIAGNOSIS — E11.65 UNCONTROLLED TYPE 2 DIABETES MELLITUS WITH HYPERGLYCEMIA, WITHOUT LONG-TERM CURRENT USE OF INSULIN (MULTI): Primary | ICD-10-CM

## 2023-10-30 PROCEDURE — RXMED WILLOW AMBULATORY MEDICATION CHARGE

## 2023-10-30 RX ORDER — LANCETS
EACH MISCELLANEOUS
Qty: 102 EACH | Refills: 1 | Status: SHIPPED | OUTPATIENT
Start: 2023-10-30 | End: 2023-11-21 | Stop reason: ALTCHOICE

## 2023-11-02 DIAGNOSIS — M25.512 LEFT SHOULDER PAIN, UNSPECIFIED CHRONICITY: ICD-10-CM

## 2023-11-08 ENCOUNTER — APPOINTMENT (OUTPATIENT)
Dept: ENDOCRINOLOGY | Facility: CLINIC | Age: 53
End: 2023-11-08
Payer: COMMERCIAL

## 2023-11-13 DIAGNOSIS — K21.9 GASTROESOPHAGEAL REFLUX DISEASE WITHOUT ESOPHAGITIS: ICD-10-CM

## 2023-11-14 ENCOUNTER — PHARMACY VISIT (OUTPATIENT)
Dept: PHARMACY | Facility: CLINIC | Age: 53
End: 2023-11-14
Payer: COMMERCIAL

## 2023-11-14 PROCEDURE — RXMED WILLOW AMBULATORY MEDICATION CHARGE

## 2023-11-14 RX ORDER — PANTOPRAZOLE SODIUM 40 MG/1
40 TABLET, DELAYED RELEASE ORAL DAILY
Qty: 90 TABLET | Refills: 0 | Status: SHIPPED | OUTPATIENT
Start: 2023-11-14 | End: 2024-03-11 | Stop reason: SDUPTHER

## 2023-11-17 ENCOUNTER — PHARMACY VISIT (OUTPATIENT)
Dept: PHARMACY | Facility: CLINIC | Age: 53
End: 2023-11-17
Payer: COMMERCIAL

## 2023-11-17 DIAGNOSIS — E11.65 UNCONTROLLED TYPE 2 DIABETES MELLITUS WITH HYPERGLYCEMIA, WITHOUT LONG-TERM CURRENT USE OF INSULIN (MULTI): Primary | ICD-10-CM

## 2023-11-17 PROCEDURE — RXMED WILLOW AMBULATORY MEDICATION CHARGE

## 2023-11-17 RX ORDER — SEMAGLUTIDE 0.68 MG/ML
0.5 INJECTION, SOLUTION SUBCUTANEOUS
Qty: 3 ML | Refills: 11 | Status: SHIPPED | OUTPATIENT
Start: 2023-11-17 | End: 2023-11-21 | Stop reason: SDUPTHER

## 2023-11-17 RX ORDER — SEMAGLUTIDE 0.68 MG/ML
INJECTION, SOLUTION SUBCUTANEOUS
Qty: 3 ML | Refills: 6 | OUTPATIENT
Start: 2023-11-17 | End: 2024-11-16

## 2023-11-21 ENCOUNTER — OFFICE VISIT (OUTPATIENT)
Dept: ENDOCRINOLOGY | Facility: CLINIC | Age: 53
End: 2023-11-21
Payer: COMMERCIAL

## 2023-11-21 ENCOUNTER — PHARMACY VISIT (OUTPATIENT)
Dept: PHARMACY | Facility: CLINIC | Age: 53
End: 2023-11-21
Payer: COMMERCIAL

## 2023-11-21 VITALS
DIASTOLIC BLOOD PRESSURE: 70 MMHG | RESPIRATION RATE: 16 BRPM | TEMPERATURE: 97.2 F | SYSTOLIC BLOOD PRESSURE: 120 MMHG | BODY MASS INDEX: 29.45 KG/M2 | HEIGHT: 61 IN | HEART RATE: 76 BPM | WEIGHT: 156 LBS

## 2023-11-21 DIAGNOSIS — E11.65 UNCONTROLLED TYPE 2 DIABETES MELLITUS WITH HYPERGLYCEMIA, WITHOUT LONG-TERM CURRENT USE OF INSULIN (MULTI): ICD-10-CM

## 2023-11-21 DIAGNOSIS — E11.9 TYPE 2 DIABETES MELLITUS WITHOUT COMPLICATION, UNSPECIFIED WHETHER LONG TERM INSULIN USE (MULTI): ICD-10-CM

## 2023-11-21 LAB — POC HEMOGLOBIN A1C: 6.2 % (ref 4.2–6.5)

## 2023-11-21 PROCEDURE — 3008F BODY MASS INDEX DOCD: CPT | Performed by: NURSE PRACTITIONER

## 2023-11-21 PROCEDURE — 99214 OFFICE O/P EST MOD 30 MIN: CPT | Performed by: NURSE PRACTITIONER

## 2023-11-21 PROCEDURE — 3044F HG A1C LEVEL LT 7.0%: CPT | Performed by: NURSE PRACTITIONER

## 2023-11-21 PROCEDURE — 83036 HEMOGLOBIN GLYCOSYLATED A1C: CPT | Performed by: NURSE PRACTITIONER

## 2023-11-21 PROCEDURE — 1036F TOBACCO NON-USER: CPT | Performed by: NURSE PRACTITIONER

## 2023-11-21 PROCEDURE — 3078F DIAST BP <80 MM HG: CPT | Performed by: NURSE PRACTITIONER

## 2023-11-21 PROCEDURE — 3074F SYST BP LT 130 MM HG: CPT | Performed by: NURSE PRACTITIONER

## 2023-11-21 RX ORDER — LANCETS
1 EACH MISCELLANEOUS 3 TIMES DAILY
Qty: 300 EACH | Refills: 3 | Status: SHIPPED | OUTPATIENT
Start: 2023-11-21 | End: 2024-01-22 | Stop reason: SDUPTHER

## 2023-11-21 RX ORDER — SEMAGLUTIDE 0.68 MG/ML
0.5 INJECTION, SOLUTION SUBCUTANEOUS
Qty: 9 ML | Refills: 3 | Status: SHIPPED | OUTPATIENT
Start: 2023-11-21

## 2023-11-21 ASSESSMENT — ENCOUNTER SYMPTOMS
PALPITATIONS: 0
WEAKNESS: 0
NUMBNESS: 0
POLYDIPSIA: 0
NAUSEA: 0
FATIGUE: 0
DIZZINESS: 0
SHORTNESS OF BREATH: 0
POLYPHAGIA: 0
DIARRHEA: 0
SLEEP DISTURBANCE: 0
APPETITE CHANGE: 0
ACTIVITY CHANGE: 0
FREQUENCY: 0
CONSTIPATION: 0
SEIZURES: 0
NERVOUS/ANXIOUS: 0

## 2023-11-21 ASSESSMENT — PAIN SCALES - GENERAL: PAINLEVEL: 0-NO PAIN

## 2023-11-21 NOTE — PROGRESS NOTES
Subjective   Edmond Cool is a 52 y.o. female who presents for initial visit for evaluation of Type 2 diabetes mellitus. The initial diagnosis of diabetes was made  April 2023 .     She was on steroids for a week from an asthma attack and sinus infection in October 2023    Known complications due to diabetes included none    Cardiovascular risk factors include diabetes mellitus. The patient is not on an ACE inhibitor or angiotensin II receptor blocker.      Current diabetes regimen is as follows:   Ozempic 0.5 mg weekly    The patient is currently checking the blood glucose 4 times per day.  Patient is using: glucometer    Hypoglycemia frequency: 0%  Hypoglycemia awareness: Yes     Exercise: daily   Meal panning: She is using avoidance of concentrated sweets.    Review of Systems   Constitutional:  Negative for activity change, appetite change and fatigue.   Respiratory:  Negative for shortness of breath.    Cardiovascular:  Negative for chest pain, palpitations and leg swelling.   Gastrointestinal:  Negative for constipation, diarrhea and nausea.   Endocrine: Negative for cold intolerance, heat intolerance, polydipsia, polyphagia and polyuria.   Genitourinary:  Negative for frequency.   Musculoskeletal:  Negative for gait problem.   Skin:  Negative for rash.   Neurological:  Negative for dizziness, seizures, weakness and numbness.   Psychiatric/Behavioral:  Negative for sleep disturbance and suicidal ideas. The patient is not nervous/anxious.        Objective   /70   Pulse 76   Temp 36.2 °C (97.2 °F)   Resp 16   Wt 70.8 kg (156 lb)   BMI 29.48 kg/m²   Physical Exam  Pulmonary:      Effort: Pulmonary effort is normal.   Skin:     General: Skin is warm and dry.   Neurological:      Mental Status: She is alert.   Psychiatric:         Mood and Affect: Mood normal.         Behavior: Behavior normal.         Thought Content: Thought content normal.         Judgment: Judgment normal.         Lab  Review  Glucose   Date Value   07/01/2023 95 mg/dL   03/19/2023 CANCELED   03/18/2023 156 mg/dL (H)     POC HEMOGLOBIN A1c (%)   Date Value   11/21/2023 6.2   06/12/2023 6.0     Hemoglobin A1C (%)   Date Value   07/01/2023 5.9 (A)   03/15/2023 10.6 (A)     Bicarbonate   Date Value   07/01/2023 26 mmol/L   03/19/2023 CANCELED   03/18/2023 27 mmol/L     Urea Nitrogen   Date Value   07/01/2023 16 mg/dL   03/19/2023 CANCELED   03/18/2023 14 mg/dL     Creatinine   Date Value   07/01/2023 0.57 mg/dL   03/19/2023 CANCELED   03/18/2023 0.57 mg/dL       Health Maintenance:   Foot Exam: None  Eye Exam: may 2023, no retinopathy  Lipid Panel: LDL 69 July 2023  Urine Albumin: < 7 April 2023    Assessment/Plan   Diagnoses and all orders for this visit:  Type 2 diabetes mellitus without complication, unspecified whether long term insulin use (CMS/[facility])  -     POCT glycosylated hemoglobin (Hb A1C) manually resulted  -     lancets (Accu-Chek Softclix Lancets) misc; 1 each 3 times a day.    Type 2 diabetes mellitus, is at goal. A1C 6.2%    RX changes:   Continue Ozempic 0.5 mg weekly. Discussed increase to 1 mg weekly if A1C >6.5%  Education:  interpretation of lab results, blood sugar goals, and complications of diabetes mellitus  Exercise: Try to move every day with goal of going to gym 4 days a week.   Hyperlipidemia: At goal. No changes  Follow up: I recommend diabetes care be 4 months.

## 2023-11-21 NOTE — PATIENT INSTRUCTIONS
Type 2 diabetes mellitus, is at goal. A1C 6.2%    RX changes:   Continue Ozempic 0.5 mg weekly. Discussed increase to 1 mg weekly if A1C >6.5%  Education:  interpretation of lab results, blood sugar goals, and complications of diabetes mellitus  Exercise: Try to move every day with goal of going to gym 4 days a week.   Hyperlipidemia: At goal. No changes  Follow up: I recommend diabetes care be 4 months.

## 2023-11-22 ENCOUNTER — PHARMACY VISIT (OUTPATIENT)
Dept: PHARMACY | Facility: CLINIC | Age: 53
End: 2023-11-22
Payer: COMMERCIAL

## 2023-11-28 ENCOUNTER — PHARMACY VISIT (OUTPATIENT)
Dept: PHARMACY | Facility: CLINIC | Age: 53
End: 2023-11-28
Payer: COMMERCIAL

## 2023-11-28 PROCEDURE — RXMED WILLOW AMBULATORY MEDICATION CHARGE

## 2023-12-04 ENCOUNTER — APPOINTMENT (OUTPATIENT)
Dept: PULMONOLOGY | Facility: CLINIC | Age: 53
End: 2023-12-04
Payer: COMMERCIAL

## 2023-12-07 PROCEDURE — RXMED WILLOW AMBULATORY MEDICATION CHARGE

## 2023-12-15 ENCOUNTER — PHARMACY VISIT (OUTPATIENT)
Dept: PHARMACY | Facility: CLINIC | Age: 53
End: 2023-12-15
Payer: COMMERCIAL

## 2023-12-20 PROCEDURE — RXMED WILLOW AMBULATORY MEDICATION CHARGE

## 2024-01-03 ENCOUNTER — PATIENT MESSAGE (OUTPATIENT)
Dept: OBSTETRICS AND GYNECOLOGY | Facility: CLINIC | Age: 54
End: 2024-01-03
Payer: COMMERCIAL

## 2024-01-03 DIAGNOSIS — Z00.00 HEALTHCARE MAINTENANCE: ICD-10-CM

## 2024-01-04 ENCOUNTER — PHARMACY VISIT (OUTPATIENT)
Dept: PHARMACY | Facility: CLINIC | Age: 54
End: 2024-01-04
Payer: COMMERCIAL

## 2024-01-04 PROCEDURE — RXMED WILLOW AMBULATORY MEDICATION CHARGE

## 2024-01-09 ENCOUNTER — TELEPHONE (OUTPATIENT)
Dept: PULMONOLOGY | Facility: HOSPITAL | Age: 54
End: 2024-01-09

## 2024-01-09 ENCOUNTER — PATIENT MESSAGE (OUTPATIENT)
Dept: ENDOCRINOLOGY | Facility: CLINIC | Age: 54
End: 2024-01-09

## 2024-01-09 ENCOUNTER — OFFICE VISIT (OUTPATIENT)
Dept: PULMONOLOGY | Facility: CLINIC | Age: 54
End: 2024-01-09
Payer: COMMERCIAL

## 2024-01-09 ENCOUNTER — PHARMACY VISIT (OUTPATIENT)
Dept: PHARMACY | Facility: CLINIC | Age: 54
End: 2024-01-09
Payer: COMMERCIAL

## 2024-01-09 VITALS
HEIGHT: 62 IN | HEART RATE: 103 BPM | BODY MASS INDEX: 27.59 KG/M2 | TEMPERATURE: 98.4 F | OXYGEN SATURATION: 99 % | DIASTOLIC BLOOD PRESSURE: 82 MMHG | WEIGHT: 149.91 LBS | RESPIRATION RATE: 18 BRPM | SYSTOLIC BLOOD PRESSURE: 126 MMHG

## 2024-01-09 DIAGNOSIS — E11.65 UNCONTROLLED TYPE 2 DIABETES MELLITUS WITH HYPERGLYCEMIA, WITHOUT LONG-TERM CURRENT USE OF INSULIN (MULTI): Primary | ICD-10-CM

## 2024-01-09 DIAGNOSIS — J45.51 SEVERE PERSISTENT ASTHMA WITH ACUTE EXACERBATION (MULTI): ICD-10-CM

## 2024-01-09 DIAGNOSIS — J45.41 MODERATE PERSISTENT ASTHMATIC BRONCHITIS WITH ACUTE EXACERBATION (HHS-HCC): Primary | ICD-10-CM

## 2024-01-09 PROCEDURE — 3008F BODY MASS INDEX DOCD: CPT | Performed by: INTERNAL MEDICINE

## 2024-01-09 PROCEDURE — 99215 OFFICE O/P EST HI 40 MIN: CPT | Performed by: INTERNAL MEDICINE

## 2024-01-09 PROCEDURE — 1036F TOBACCO NON-USER: CPT | Performed by: INTERNAL MEDICINE

## 2024-01-09 PROCEDURE — 3074F SYST BP LT 130 MM HG: CPT | Performed by: INTERNAL MEDICINE

## 2024-01-09 PROCEDURE — RXMED WILLOW AMBULATORY MEDICATION CHARGE

## 2024-01-09 PROCEDURE — 3079F DIAST BP 80-89 MM HG: CPT | Performed by: INTERNAL MEDICINE

## 2024-01-09 RX ORDER — EVENING PRIMROSE OIL 500 MG
CAPSULE ORAL DAILY
COMMUNITY

## 2024-01-09 RX ORDER — PREDNISONE 10 MG/1
TABLET ORAL
Qty: 30 TABLET | Refills: 1 | Status: SHIPPED | OUTPATIENT
Start: 2024-01-09 | End: 2024-01-23

## 2024-01-09 ASSESSMENT — ASTHMA QUESTIONNAIRES
QUESTION_4 LAST FOUR WEEKS HOW OFTEN HAVE YOU USED YOUR RESCUE INHALER OR NEBULIZER MEDICATION (SUCH AS ALBUTEROL): 3 OR MORE TIMES PER DAY
QUESTION_3 LAST FOUR WEEKS HOW OFTEN DID YOUR ASTHMA SYMPTOMS (WHEEZING, COUGHING, SHORTNESS OF BREATH, CHEST TIGHTNESS OR PAIN) WAKE YOU UP AT NIGHT OR EARLIER THAN USUAL IN THE MORNING: ONCE A WEEK
QUESTION_2 LAST FOUR WEEKS HOW OFTEN HAVE YOU HAD SHORTNESS OF BREATH: ONCE A DAY
QUESTION_5 LAST FOUR WEEKS HOW WOULD YOU RATE YOUR ASTHMA CONTROL: POORLY CONTROLLED
ACT_TOTALSCORE: 9
QUESTION_1 LAST FOUR WEEKS HOW MUCH OF THE TIME DID YOUR ASTHMA KEEP YOU FROM GETTING AS MUCH DONE AT WORK, SCHOOL OR AT HOME: ALL OF THE TIME

## 2024-01-09 NOTE — TELEPHONE ENCOUNTER
Tried to call pt at [phone] regarding Dupixent injection, but was unsuccessful. Voicemail message left with instructions to return the call at [phone].

## 2024-01-09 NOTE — PROGRESS NOTES
"Patient is here for a follow up visit. Last seen october 2023 by Dr Fowler     Interval 1/9/2024  Patient states she has \"gown downhill\" since last visit. Had \"double pneumonia\" and \"double asthma attack.\" Feels as though lungs have just been damaged as a result. Was also working in a building with mold, had to switch jobs. Reports \"asthma attack\" just before [place] with shortness of breath and wheezing. Also having increasing shortness of breath with exertion, that causes light-headedness. Walked with pulse ox yesterday and dropped to 89% SaO2.     Is using albuterol rescue inhaler every 4 hours. Also using Advair and Montelukast. She did put herself on steroids last week with rescue pack and this helped with shortness of breath but then symptoms worsened as soon as she stopped steroids.     Interval labs and radiology:  none    ROS: (POSITIVE ROS in UPPER CASE)  Constitutional symptoms: No unexplained weight loss, night sweats, fatigue/malaise/lethargy, loss of appetite, fever  Eyes: no visual changes   Ears, nose, mouth, and throat (ENT): No runny nose, nose bleeds,ear pain   Cardiovascular: No chest pain, loss of consciousness, claudication    Respiratory: COUGH, sputum, wheeze, hemoptysis    Gastrointestinal: No abdominal pain, nausea/vomiting, diarrhea/CONSTIPATION  Genitourinary: No dysuria, hematuria   Musculoskeletal: No stiffness, joint swelling   Neurological: No headache, numbness, limb weakness, poor balance    Endocrine: No polydipsia, polyuria   Hematologic: No purpura, petechia, prolonged or excessive bleeding    Physical exam:  Vitals reviewed :see details in note  /82   Pulse 103   Temp 36.9 °C (98.4 °F) (Temporal)   Resp 18   Ht 1.575 m (5' 2\")   Wt 68 kg (149 lb 14.6 oz)   SpO2 99%   BMI 27.42 kg/m²   Constitutional: General appearance normal  Head: Normocephalic  Neck: neck supple, no lymphadenopathy  ENT: External inspection of ears and nose normal, moist mucus membranes, no " thrush  Pulmonary: MILD AUDIBLE WHEEZING BILATERALLY,   Cardiac: Normal S1 S2, no murmurs, rubs, gallops  GI: no abdominal distension, bowel sounds normal  Musculoskeletal: no swollen joints  Extremities: No lower extremity edema present.  Neuro: alert and oriented, no focal deficits, normal cognition  Skin: no erythema nodosum, skin lesions/swellings on extremities      Diag testing   PFT last 2021 pos BDR in the small airways no obstruction.     IgE 500 2022, 347 2023, neg aspergillus iGe and Igg  Eosinos 300   === 05/30/23 ===    CT CHEST WO IV CONTRAST    - Impression -  1. No significant interval change in the lungs compared to CT  03/13/2023, as described above. Findings may represent sequela of  prior infectious/inflammatory process such as reported recent COVID  pneumonia requiring hospitalization. Small airways disease and  atypical infectious processes remain differential considerations. A  component of organizing pneumonia is again not excluded.  2. Stable borderline and mildly enlarged mediastinal and upper  abdominal lymph nodes, likely reactive.  3. Small sliding hiatal hernia.  4. Stable 3 mm nodule in the right upper lobe.    I personally reviewed the images/study and I agree with the findings  as stated by resident physician Dr. Daniel Rivas.      Impression and Plan    52-year old AA female (ex smoker, quit 22 years, <5 pack years), h/o asthma (since age 25), htn, hypercholesteremia, here for evaluation of asthma symptoms that have been uncontrolled of late.  Patient experience shortness of breath and cough constantly. She had covid about few months ago, had to be hospitalized for 1 day.  Has been on steroids on and off for about once a month. She has relief from the steroids.    1) severe persistent asthma -   Continue albuterol nebs twice daily  And up to 4 times as needed.  Continue Advair 500/50 1 puff twice daily. Rinse and gargle after each use ( she gets it from [facility])  Ige levels >500  -->323, eosi 150-300, no fenO   Plan to start Dupilumab   Patient to follow up with Dr Madrigal, her allergist.  Use albuterol as needed       Continue monteleukast    2) Bilateral interstitial infiltrates - CT scan of the chest 05 2023 showed bilateral mild bronchial wall thickening and mucus plugging, minimal air trapping 3 mm RUL lung nodule. Patchy lobular bandlike areas of opacity in bilateral lungs.  Repeat CT shows persistent patchy infiltrates, likely residual from prior COVID.  (Aspergillus IgE and igG negative)  Need repeat CT soon       3) Allergic rhinitis and post nasal drip  - Continue Flonase 1 sq in each nostril twice daily  - Continue to follow up with allergy.    4) Asthma exacerbation  Treatment detailed in AVS       RTC in 6 months

## 2024-01-09 NOTE — PATIENT INSTRUCTIONS
Dear Edmond Cool It was nice seeing you today. We discussed the following:     You are currently in an acute asthma exacerbation and we will treat with prednisone 30 mg for 5 days then 20 mg for 5 days then 10 mg for 4 days then stop  If your symptoms are not improved or worse please report to the emergency room  Please update us by phone how you are doing   We need to start you on a injectable medication for asthma given your frequent exacerbations and need for prednisone.  The medication is called Dupixent.  Someone from the pharmacy will be in touch with you to arrange for the injections after you are over your asthma exacerbation.    For scheduling purposes:    Call [phone] to schedule a breathing or a walking test     Call [phone] to schedule  EKG's, Echocardiograms and Cardiopulmonary Stress Tests.    Call [phone] to schedule Radiology tests such as Nuclear Medicine Stress Tests, CT Scans, and MRI's.    Should you have any questions Please Call my assistant Sally Clayton at [phone] or our pulmonary nurse Dariela Wen [phone].

## 2024-01-09 NOTE — TELEPHONE ENCOUNTER
Pt returned the call regarding her Dupixent injection. She stated that she would like to self inject at home. It was explained that Dr. Mcclendon would like her to get the first dose at the clinic or infusion center. She verbalized understanding. Will reach out to  Specialty Pharmacy regarding her Dupixent.

## 2024-01-11 ENCOUNTER — TELEPHONE (OUTPATIENT)
Dept: PULMONOLOGY | Facility: HOSPITAL | Age: 54
End: 2024-01-11
Payer: COMMERCIAL

## 2024-01-22 DIAGNOSIS — E11.9 TYPE 2 DIABETES MELLITUS WITHOUT COMPLICATION, UNSPECIFIED WHETHER LONG TERM INSULIN USE (MULTI): ICD-10-CM

## 2024-01-22 PROCEDURE — RXMED WILLOW AMBULATORY MEDICATION CHARGE

## 2024-01-22 RX ORDER — LANCETS
1 EACH MISCELLANEOUS 3 TIMES DAILY
Qty: 300 EACH | Refills: 3 | Status: SHIPPED | OUTPATIENT
Start: 2024-01-22

## 2024-01-23 ENCOUNTER — HOSPITAL ENCOUNTER (OUTPATIENT)
Dept: RADIOLOGY | Facility: CLINIC | Age: 54
Discharge: HOME | End: 2024-01-23
Payer: COMMERCIAL

## 2024-01-23 ENCOUNTER — PHARMACY VISIT (OUTPATIENT)
Dept: PHARMACY | Facility: CLINIC | Age: 54
End: 2024-01-23
Payer: COMMERCIAL

## 2024-01-23 VITALS — WEIGHT: 149.91 LBS | HEIGHT: 62 IN | BODY MASS INDEX: 27.59 KG/M2

## 2024-01-23 DIAGNOSIS — I10 BENIGN ESSENTIAL HYPERTENSION: ICD-10-CM

## 2024-01-23 DIAGNOSIS — E11.9 TYPE 2 DIABETES MELLITUS WITHOUT COMPLICATION, WITHOUT LONG-TERM CURRENT USE OF INSULIN (MULTI): Primary | ICD-10-CM

## 2024-01-23 DIAGNOSIS — Z00.00 HEALTHCARE MAINTENANCE: ICD-10-CM

## 2024-01-23 PROCEDURE — RXMED WILLOW AMBULATORY MEDICATION CHARGE

## 2024-01-23 PROCEDURE — 77067 SCR MAMMO BI INCL CAD: CPT

## 2024-01-23 PROCEDURE — 77067 SCR MAMMO BI INCL CAD: CPT | Performed by: RADIOLOGY

## 2024-01-23 PROCEDURE — 77063 BREAST TOMOSYNTHESIS BI: CPT | Performed by: RADIOLOGY

## 2024-01-23 RX ORDER — DEXTROSE 4 G
TABLET,CHEWABLE ORAL
Qty: 1 EACH | Refills: 0 | Status: SHIPPED | OUTPATIENT
Start: 2024-01-23

## 2024-01-23 RX ORDER — AMLODIPINE BESYLATE 5 MG/1
5 TABLET ORAL DAILY
Qty: 90 TABLET | Refills: 1 | Status: SHIPPED | OUTPATIENT
Start: 2024-01-23 | End: 2024-08-01

## 2024-02-01 PROCEDURE — RXMED WILLOW AMBULATORY MEDICATION CHARGE

## 2024-02-02 ENCOUNTER — PHARMACY VISIT (OUTPATIENT)
Dept: PHARMACY | Facility: CLINIC | Age: 54
End: 2024-02-02
Payer: COMMERCIAL

## 2024-02-02 ENCOUNTER — SPECIALTY PHARMACY (OUTPATIENT)
Dept: PHARMACY | Facility: CLINIC | Age: 54
End: 2024-02-02

## 2024-02-06 ENCOUNTER — SPECIALTY PHARMACY (OUTPATIENT)
Dept: ALLERGY | Facility: CLINIC | Age: 54
End: 2024-02-06
Payer: COMMERCIAL

## 2024-02-06 NOTE — PROGRESS NOTES
[facility] Clinical Note    Edmond Cool is a 53 y.o. female, who is on the specialty pharmacy service for management of: Asthma/Allergy Core with status of: (Enrolled)     Edmond was contacted on 2/6/2024.    Refer to the encounter summary report for documentation details about patient counseling and education.      Medication Adherence  The importance of adherence was discussed with the patient and they were advised to take the medication as prescribed by their provider. Edmond was encouraged to call her physician's office if they have a question regarding a missed dose.     Conclusion  Rate your quality of life on scale of 1-10: -- (Unable to assess)  Rate your satisfaction with  Specialty Pharmacy on scale of 1-10: 10 - Completely satisfied (No issues reported)    Patient advised to contact the pharmacy if there are any changes to her medication list, including prescriptions, OTC medications, herbal products, or supplements. Patient was advised of [facility]’s dispensing process, refill timeline, contact information ([phone]), and patient management follow up. Patient confirmed understanding of education conducted during assessment. All patient questions and concerns were addressed to the best of my ability. Patient was encouraged to contact the specialty pharmacy with any questions or concerns.    Confirmed follow-up outreaches are properly scheduled. Reviewed goals of therapy in the program targets.    Gwen Mitchell, PharmD

## 2024-02-15 ENCOUNTER — SPECIALTY PHARMACY (OUTPATIENT)
Dept: PHARMACY | Facility: CLINIC | Age: 54
End: 2024-02-15

## 2024-02-15 PROCEDURE — RXMED WILLOW AMBULATORY MEDICATION CHARGE

## 2024-02-16 ENCOUNTER — SPECIALTY PHARMACY (OUTPATIENT)
Dept: PHARMACY | Facility: CLINIC | Age: 54
End: 2024-02-16

## 2024-02-16 ENCOUNTER — PHARMACY VISIT (OUTPATIENT)
Dept: PHARMACY | Facility: CLINIC | Age: 54
End: 2024-02-16
Payer: COMMERCIAL

## 2024-02-28 ENCOUNTER — OFFICE VISIT (OUTPATIENT)
Dept: OTOLARYNGOLOGY | Facility: CLINIC | Age: 54
End: 2024-02-28
Payer: COMMERCIAL

## 2024-02-28 ENCOUNTER — PHARMACY VISIT (OUTPATIENT)
Dept: PHARMACY | Facility: CLINIC | Age: 54
End: 2024-02-28
Payer: COMMERCIAL

## 2024-02-28 VITALS — WEIGHT: 153 LBS | TEMPERATURE: 97.1 F | BODY MASS INDEX: 27.98 KG/M2

## 2024-02-28 DIAGNOSIS — J34.89 NASAL OBSTRUCTION: ICD-10-CM

## 2024-02-28 DIAGNOSIS — J30.9 ALLERGIC RHINITIS, UNSPECIFIED SEASONALITY, UNSPECIFIED TRIGGER: ICD-10-CM

## 2024-02-28 DIAGNOSIS — R04.0 EPISTAXIS: ICD-10-CM

## 2024-02-28 DIAGNOSIS — J31.0 CHRONIC RHINITIS: Primary | ICD-10-CM

## 2024-02-28 PROCEDURE — 3008F BODY MASS INDEX DOCD: CPT | Performed by: OTOLARYNGOLOGY

## 2024-02-28 PROCEDURE — 1036F TOBACCO NON-USER: CPT | Performed by: OTOLARYNGOLOGY

## 2024-02-28 PROCEDURE — 99203 OFFICE O/P NEW LOW 30 MIN: CPT | Performed by: OTOLARYNGOLOGY

## 2024-02-28 PROCEDURE — RXMED WILLOW AMBULATORY MEDICATION CHARGE

## 2024-02-28 PROCEDURE — RXOTC WILLOW AMBULATORY OTC CHARGE

## 2024-02-28 RX ORDER — AZELASTINE 1 MG/ML
1 SPRAY, METERED NASAL 2 TIMES DAILY
Qty: 30 ML | Refills: 3 | Status: SHIPPED | OUTPATIENT
Start: 2024-02-28 | End: 2025-02-27

## 2024-02-28 RX ORDER — FLUTICASONE PROPIONATE 50 MCG
2 SPRAY, SUSPENSION (ML) NASAL DAILY
Qty: 16 G | Refills: 3 | Status: SHIPPED | OUTPATIENT
Start: 2024-02-28 | End: 2024-05-14 | Stop reason: ALTCHOICE

## 2024-02-28 ASSESSMENT — PATIENT HEALTH QUESTIONNAIRE - PHQ9
2. FEELING DOWN, DEPRESSED OR HOPELESS: NOT AT ALL
1. LITTLE INTEREST OR PLEASURE IN DOING THINGS: NOT AT ALL
SUM OF ALL RESPONSES TO PHQ9 QUESTIONS 1 AND 2: 0

## 2024-02-28 NOTE — PROGRESS NOTES
Chief Complaint: Sinus complaints and epistaxis from the left nostril only X3 weeks.     Referring Provider:  None.     History of Present Illness:       Edmond Cool is a 53 y.o. female, presenting for evaluation of Epistaxis and feelings of persistent sinus infection.     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 Over the past three weeks she began to have epistaxis only out of the left nostril. She had three nose bleeding episodes the first week in which she needed to hold pressure for 5-10 minutes to achieve hemostasis. She has not had a nose bleed in the past two weeks but does notice blood on the tissue when she blows her nose. She also complains of feelings of sinus infection over the past three to four weeks. She has not been seen or treated for this. Symptoms include constant PND, intermittent facial pressure (L>R), frequent headaches, constant sneezing, throat clearing, itchy and watery eyes. She denies anterior nasal drainage, previous sinonasal surgery, nasal trauma, aspirin sensitivity. Has had sever decrease in smell since she contracted COVID in 2020. Does have asthma, on Dupixent (2 injections) and following with Dr. Luis Mcclendon pulmonology. Reports 2 sinus infections per year requiring antibiotics. Current intranasal medications include saline spray, 1-2 sprays each nostril 4+ times per day, and Flonase PRN. She also take Singulair and Claritin for allergies.     ?  Review of Systems:    Review of symptoms was negative except for those stated including Cardiopulmonary, Genitourinary, Gastrointestinal, Psychological, Sleep pattern, Endocrine, Eyes, Neurologic, Musculoskeletal, Skin, Hematologic/Lymphatic and Allergic/Immunologic.     Medical History:    I have reviewed the patient's updated past medical history, surgical history, family history, social history, as well as current medications and allergies as of 2/28/2024. Changes to these items have been updated and marked as reviewed in the  electronic medical record.    Physical Exam:    Vitals:  weight is 69.4 kg (153 lb). Her temperature is 36.2 °C (97.1 °F).   General: Patient doing well overall and is in no apparent distress.  Psych: Pleasant affect, and answers questions appropriately.  Head & Face: Symmetric facial movements  Eyes: Pupils equal, round, reactive.  Extraocular movements intact without gaze restrictions or nystagmus. No epiphora.  Ears:  External auditory canals are normal.  Tympanic membranes are clear.  No middle ear effusion is seen.  All middle ear landmarks are normal.  Nose: Anterior rhinoscopy revealed normal sinonasal mucosa. More posterior areas of the nasal cavity could not be completely examined.  Oral Cavity/Oropharynx:  Without lesions or masses to visual exam.  Neck: Supple without lymphadenopathy.  Lungs: Non-labored, and without evidence of stridor.  Cardiac: Pulses are strong, well-perfused.  Extremities: Without gross evidence of clubbing, cyanosis, or edema.  Neuro: Cranial nerves II-XII grossly intact; Intact facial movements.    Procedure:  Rigid nasal endoscopy (66247)  Pre-procedure diagnosis/Indication for procedure:  To evaluate areas not visualized on anterior rhinoscopy   Anesthesia:  1% phenylephrine and 4% lidocaine topical spray   Description:  A 0-degree 4-mm rigid nasal endoscope was used to examine the left and right nasal cavities.  The nasal valve areas were examined for abnormalities or collapse.  The inferior and middle turbinates were evaluated.  The middle and superior meatuses, and the sphenoethmoid recesses were examined and inspected for mucopurulence and polyps. Once the endoscope was withdrawn, the patient was noted to have tolerated the procedure well without complications and was returned to ambulatory status.    Findings:    Very edematous mucosa throughout the sinonasal cavity.     Assessment:     Edmond Cool is a 53 y.o. female with clinical findings consistent with rhinitis.      Plan:      Recommend saline irrigations twice daily, Neilmed bottle given in office. Instructed on proper administration technique.   Start Flonase one spray each nostril twice daily. Reviewed proper administration techniques.   Start Azelastine one spray each nostril twice daily.   Follow up in six weeks to assess progress or sooner if needed.       Jacinto Pacheco MD, M.Eng.   of Otolaryngology - Head & Neck Surgery  Division of Rhinology and Endoscopic Skull Base Surgery  [facility]

## 2024-02-28 NOTE — PATIENT INSTRUCTIONS
"Purchase \"saline gel\" over the counter and place a pea/bead size amount in both sides of the nose twice daily.   "

## 2024-03-11 DIAGNOSIS — K21.9 GASTROESOPHAGEAL REFLUX DISEASE WITHOUT ESOPHAGITIS: ICD-10-CM

## 2024-03-11 PROCEDURE — RXMED WILLOW AMBULATORY MEDICATION CHARGE

## 2024-03-11 RX ORDER — BLOOD SUGAR DIAGNOSTIC
STRIP MISCELLANEOUS
Qty: 300 EACH | Refills: 0 | Status: CANCELLED | OUTPATIENT
Start: 2024-03-11 | End: 2025-03-11

## 2024-03-11 RX ORDER — PANTOPRAZOLE SODIUM 40 MG/1
40 TABLET, DELAYED RELEASE ORAL DAILY
Qty: 90 TABLET | Refills: 0 | Status: SHIPPED | OUTPATIENT
Start: 2024-03-11 | End: 2025-03-11

## 2024-03-12 ENCOUNTER — PHARMACY VISIT (OUTPATIENT)
Dept: PHARMACY | Facility: CLINIC | Age: 54
End: 2024-03-12
Payer: COMMERCIAL

## 2024-03-12 ENCOUNTER — TELEMEDICINE (OUTPATIENT)
Dept: PHARMACY | Facility: HOSPITAL | Age: 54
End: 2024-03-12
Payer: COMMERCIAL

## 2024-03-12 DIAGNOSIS — E09.65: Primary | ICD-10-CM

## 2024-03-12 DIAGNOSIS — E11.9 TYPE 2 DIABETES MELLITUS WITHOUT COMPLICATION, WITHOUT LONG-TERM CURRENT USE OF INSULIN (MULTI): ICD-10-CM

## 2024-03-12 PROCEDURE — RXMED WILLOW AMBULATORY MEDICATION CHARGE

## 2024-03-12 RX ORDER — BLOOD SUGAR DIAGNOSTIC
STRIP MISCELLANEOUS
Qty: 300 EACH | Refills: 3 | Status: SHIPPED | OUTPATIENT
Start: 2024-03-12

## 2024-03-12 RX ORDER — ISOPROPYL ALCOHOL 70 ML/100ML
SWAB TOPICAL
Qty: 300 EACH | Refills: 3 | Status: SHIPPED | OUTPATIENT
Start: 2024-03-12

## 2024-03-12 NOTE — PROGRESS NOTES
[facility] ([facility])    Edmond Cool is a 53 y.o. female was referred to Clinical Pharmacy Team to complete a comprehensive medication review (CMR) with a pharmacist as part of the [employer] diabetes program.      Referring Provider: David Skelton MD    Subjective   Allergies   Allergen Reactions    Bee Venom Protein (Honey Bee) Unknown and Shortness of breath     Severe allergy reaction to wasp    Clindamycin Unknown    Dog Dander Unknown    Doxycycline Unknown    Ipratropium Unknown    Ketorolac Unknown    Penicillin V Other     05/24/2005;THROAT CLOGGING, 05/24/2005;THROAT CLOGGING    Penicillins Unknown    Propoxyphene Unknown    Propoxyphene N-Acetaminophen Unknown    Shellfish Containing Products Unknown    Sulfa (Sulfonamide Antibiotics) Unknown    Sulfamethoxazole-Trimethoprim Unknown     rash, rash    Tramadol Unknown        [facility]  [address]  [place] [address]  Phone: [phone] Fax: [phone]    [facility] - [facility] - [facility] [address] [facility]  [place] [address]  Phone: [phone] Fax: [phone]     Specialty Pharmacy  [address] [address]  Phone: [phone] Fax: [phone]      Diabetes  She has type 2 diabetes mellitus. Her disease course has been stable. There are no hypoglycemic associated symptoms. There are no hypoglycemic complications. Current diabetic treatments: Ozempic 0.5 mg weekly. She is compliant with treatment all of the time.       Objective     There were no vitals taken for this visit.     LAB  Lab Results   Component Value Date    BILITOT 0.4 03/12/2023    CALCIUM 9.6 07/01/2023    CO2 26 07/01/2023     (H) 07/01/2023    CREATININE 0.57 07/01/2023    GLUCOSE 95 07/01/2023    ALKPHOS 125 (H) 03/12/2023    K 4.4 07/01/2023    PROT 7.4 03/12/2023  "    07/01/2023    AST 23 03/12/2023    ALT 27 03/12/2023    BUN 16 07/01/2023    ANIONGAP 10 07/01/2023    MG 1.80 02/15/2023    PHOS 3.8 08/18/2020     (H) 03/11/2022    ALBUMIN 4.4 03/12/2023    GFRF >90 07/01/2023    GFRMALE CANCELED 03/19/2023     Lab Results   Component Value Date    TRIG 61 07/01/2023    CHOL 134 07/01/2023    HDL 52.5 07/01/2023     Lab Results   Component Value Date    HGBA1C 6.2 11/21/2023       Current Outpatient Medications on File Prior to Visit   Medication Sig Dispense Refill    albuterol 2.5 mg /3 mL (0.083 %) nebulizer solution Take 3 mL (2.5 mg) by nebulization every 6 hours if needed for wheezing. 1080 mL 3    albuterol 90 mcg/actuation inhaler INHALE 2 PUFFS BY MOUTH EVERY 4 HOURS AS NEEDED FOR WHEEZING 6.7 g 2    amLODIPine (Norvasc) 5 mg tablet Take 1 tablet (5 mg) by mouth once daily. 90 tablet 1    azelastine (Astelin) 137 mcg (0.1 %) nasal spray Administer 1 spray into each nostril 2 times a day. Use in each nostril as directed 30 mL 3    BD Ultra-Fine April Pen Needle 32 gauge x 5/32\" needle       blood sugar diagnostic strip USE AS DIRECTED TO TEST BLOOD SUGAR THREE TIMES A  each 0    blood-glucose meter (Accu-Chek Guide Glucose Meter) misc Use to check glucose as directed 1 each 0    blood-glucose sensor (DEXCOM G7 SENSOR MISC) Use as directed for continuous glucose monitoring - change every 10 days      blood-glucose sensor device APPLY AS DIRECTED. CHANGE EVERY 10 DAYS. 3 each 11    dupilumab (Dupixent Pen) 300 mg/2 mL injection Inject 300 mg (1 pen) under the skin every 14 (fourteen) days. 4 mL 11    fluticasone (Flonase) 50 mcg/actuation nasal spray USE 1 SPRAY IN EACH NOSTRIL EVERY 12 HOURS 16 g 3    fluticasone (Flonase) 50 mcg/actuation nasal spray Administer 2 sprays into each nostril once daily. Shake gently. Before first use, prime pump. After use, clean tip and replace cap. 16 g 3    fluticasone propion-salmeteroL (Advair Diskus) 500-50 " mcg/dose diskus inhaler Inhale 1 puff 2 times a day. 60 each 3    lancets (Accu-Chek Softclix Lancets) misc Use 1 new lancet to test blood sugar 3 times a day 300 each 3    magnesium 30 mg tablet Take 1 tablet (30 mg) by mouth 2 times a day.      montelukast (Singulair) 10 mg tablet Take 1 tablet (10 mg) by mouth once daily at bedtime. 90 tablet 3    pantoprazole (ProtoNix) 40 mg EC tablet TAKE 1 TABLET BY MOUTH ONCE DAILY 90 tablet 0    rosuvastatin (Crestor) 20 mg tablet TAKE 1 TABLET BY MOUTH ONCE DAILY 90 tablet 1    semaglutide (Ozempic) 0.25 mg or 0.5 mg (2 mg/3 mL) pen injector Inject 0.5 mg under the skin 1 (one) time per week. 9 mL 3    vitamin E 45 mg (100 unit) capsule Take by mouth once daily.      [DISCONTINUED] pantoprazole (ProtoNix) 40 mg EC tablet TAKE 1 TABLET BY MOUTH ONCE DAILY 90 tablet 0     No current facility-administered medications on file prior to visit.          ASSESSMENT/PLAN:   Employee Health Diabetes Program (VBID)  - Patient enrolled in  Employee diabetes program for $0 co-pays on diabetes medications/supplies. Enrollment should be active in 2-4 weeks.  - Requested [facility] enrollment date: 2/27/24  - PharmD Management Level: 3    Assessment/Plan   Problem List Items Addressed This Visit       Diabetes mellitus (CMS/HCC) - Primary    Relevant Medications    blood sugar diagnostic (Accu-Chek Guide test strips) strip    alcohol swabs (Alcohol Pads) pads, medicated    Type 2 diabetes mellitus without complications (CMS/HCC)          Follow up: 11 months    Continue all meds under the continuation of care with the referring provider and clinical pharmacy team.    John He, PharmD     Verbal consent to manage patient's drug therapy was obtained from [the patient and/or an individual authorized to act on behalf of a patient]. They were informed they may decline to participate or withdraw from participation in pharmacy services at any time.

## 2024-03-14 ENCOUNTER — PHARMACY VISIT (OUTPATIENT)
Dept: PHARMACY | Facility: CLINIC | Age: 54
End: 2024-03-14
Payer: COMMERCIAL

## 2024-03-15 ENCOUNTER — APPOINTMENT (OUTPATIENT)
Dept: PHARMACY | Facility: HOSPITAL | Age: 54
End: 2024-03-15
Payer: COMMERCIAL

## 2024-03-26 ENCOUNTER — CONSULT (OUTPATIENT)
Dept: ALLERGY | Facility: CLINIC | Age: 54
End: 2024-03-26
Payer: COMMERCIAL

## 2024-03-26 VITALS
SYSTOLIC BLOOD PRESSURE: 137 MMHG | HEART RATE: 73 BPM | HEIGHT: 61 IN | WEIGHT: 153.6 LBS | BODY MASS INDEX: 29 KG/M2 | DIASTOLIC BLOOD PRESSURE: 84 MMHG

## 2024-03-26 DIAGNOSIS — J45.41 MODERATE PERSISTENT ASTHMATIC BRONCHITIS WITH ACUTE EXACERBATION (HHS-HCC): ICD-10-CM

## 2024-03-26 DIAGNOSIS — J31.0 CHRONIC RHINITIS: Primary | ICD-10-CM

## 2024-03-26 DIAGNOSIS — J45.51 SEVERE PERSISTENT ASTHMA WITH ACUTE EXACERBATION (MULTI): ICD-10-CM

## 2024-03-26 PROCEDURE — 95004 PERQ TESTS W/ALRGNC XTRCS: CPT | Performed by: ALLERGY & IMMUNOLOGY

## 2024-03-26 PROCEDURE — 99204 OFFICE O/P NEW MOD 45 MIN: CPT | Performed by: ALLERGY & IMMUNOLOGY

## 2024-03-26 NOTE — PROGRESS NOTES
"Subjective   Patient ID:   [id number]   Edmond Cool is a 53 y.o. female who presents for Allergies and Asthma.    Chief Complaint   Patient presents with    Allergies    Asthma          Asthma  Her past medical history is significant for asthma.     This patient is here to evaluate for:    Reports exposure to mold at work and had to change jobs.     Asthma attack 12/23 with shortness of breath and wheezing.    Was using albuterol q4hrs, as well as advair, montelukast and needed steroids (medrol)  I reviewed pulmonary notes from 1/9/24    She has used prednisone within the past 6 months and then had a frequent usage prior to this.     Started dupixent 1 1/2/ month ago and s/p 3 injections. She still is having wheezing.     Send by her pulmonary doctor.    I reviewed ENT notes and dx with rhinitiis and epistaxis but no polyps.    No allergies to nsaids or aspirin.    Pmhx  asthma (since age 25), htn, hypercholesteremia,   S/p covid hospitalization in 2023.     Review of Systems   All other systems reviewed and are negative.        Objective     /84   Pulse 73   Ht 1.549 m (5' 1\")   Wt 69.7 kg (153 lb 9.6 oz)   BMI 29.02 kg/m²      Physical Exam  Vitals and nursing note reviewed.   Constitutional:       Appearance: Normal appearance. She is normal weight.   HENT:      Head: Normocephalic and atraumatic.      Right Ear: External ear normal.      Left Ear: External ear normal.      Nose: No septal deviation, congestion or rhinorrhea.      Right Turbinates: Not enlarged, swollen or pale.      Left Turbinates: Not enlarged, swollen or pale.      Comments: Inferior turbinate hypertrophy , Septum unremarkable without perforation or ulceration, no polyps seen     Mouth/Throat:      Mouth: Mucous membranes are moist.   Eyes:      Extraocular Movements: Extraocular movements intact.      Conjunctiva/sclera: Conjunctivae normal.      Pupils: Pupils are equal, round, and reactive to light.   Cardiovascular:      " "Rate and Rhythm: Normal rate and regular rhythm.      Pulses: Normal pulses.      Heart sounds: Normal heart sounds.   Pulmonary:      Effort: Pulmonary effort is normal.      Breath sounds: Normal breath sounds. No wheezing, rhonchi or rales.   Musculoskeletal:         General: Normal range of motion.   Skin:     General: Skin is warm and dry.      Findings: No erythema or rash.   Neurological:      General: No focal deficit present.      Mental Status: She is alert and oriented to person, place, and time.   Psychiatric:         Mood and Affect: Mood normal.         Behavior: Behavior normal.            Current Outpatient Medications   Medication Sig Dispense Refill    albuterol 2.5 mg /3 mL (0.083 %) nebulizer solution Take 3 mL (2.5 mg) by nebulization every 6 hours if needed for wheezing. 1080 mL 3    albuterol 90 mcg/actuation inhaler INHALE 2 PUFFS BY MOUTH EVERY 4 HOURS AS NEEDED FOR WHEEZING 6.7 g 2    alcohol swabs (Alcohol Pads) pads, medicated Use as directed for diabetes care 300 each 3    amLODIPine (Norvasc) 5 mg tablet Take 1 tablet (5 mg) by mouth once daily. 90 tablet 1    azelastine (Astelin) 137 mcg (0.1 %) nasal spray Administer 1 spray into each nostril 2 times a day. Use in each nostril as directed 30 mL 3    BD Ultra-Fine April Pen Needle 32 gauge x 5/32\" needle       blood sugar diagnostic (Accu-Chek Guide test strips) strip Use as directed to test sugars up to 3 times daily 300 each 3    blood sugar diagnostic strip USE AS DIRECTED TO TEST BLOOD SUGAR THREE TIMES A  each 0    blood-glucose meter (Accu-Chek Guide Glucose Meter) misc Use to check glucose as directed 1 each 0    blood-glucose sensor (DEXCOM G7 SENSOR MISC) Use as directed for continuous glucose monitoring - change every 10 days      blood-glucose sensor device APPLY AS DIRECTED. CHANGE EVERY 10 DAYS. 3 each 11    dupilumab (Dupixent Pen) 300 mg/2 mL injection Inject 300 mg (1 pen) under the skin every 14 (fourteen) days. " 4 mL 11    fluticasone (Flonase) 50 mcg/actuation nasal spray USE 1 SPRAY IN EACH NOSTRIL EVERY 12 HOURS 16 g 3    fluticasone (Flonase) 50 mcg/actuation nasal spray Administer 2 sprays into each nostril once daily. Shake gently. Before first use, prime pump. After use, clean tip and replace cap. 16 g 3    fluticasone propion-salmeteroL (Advair Diskus) 500-50 mcg/dose diskus inhaler Inhale 1 puff 2 times a day. 60 each 3    lancets (Accu-Chek Softclix Lancets) misc Use 1 new lancet to test blood sugar 3 times a day 300 each 3    magnesium 30 mg tablet Take 1 tablet (30 mg) by mouth 2 times a day.      montelukast (Singulair) 10 mg tablet Take 1 tablet (10 mg) by mouth once daily at bedtime. 90 tablet 3    pantoprazole (ProtoNix) 40 mg EC tablet TAKE 1 TABLET BY MOUTH ONCE DAILY 90 tablet 0    rosuvastatin (Crestor) 20 mg tablet TAKE 1 TABLET BY MOUTH ONCE DAILY 90 tablet 1    semaglutide (Ozempic) 0.25 mg or 0.5 mg (2 mg/3 mL) pen injector Inject 0.5 mg under the skin 1 (one) time per week. 9 mL 3    vitamin E 45 mg (100 unit) capsule Take by mouth once daily.       No current facility-administered medications for this visit.       Summary of the labs over the past 6 months:    Office Visit on 11/21/2023   Component Date Value Ref Range Status    POC HEMOGLOBIN A1c 11/21/2023 6.2  4.2 - 6.5 % Final      Latest Reference Range & Units 03/12/23 20:09   WBC 4.4 - 11.3 x10E9/L 7.1   RBC 4.00 - 5.20 x10E12/L 5.06   HEMOGLOBIN 12.0 - 16.0 g/dL 15.1   HEMATOCRIT 36.0 - 46.0 % 43.2   MCV 80 - 100 fL 85   MCHC 32.0 - 36.0 g/dL 35.0   RED CELL DISTRIBUTION WIDTH 11.5 - 14.5 % 12.6   Platelets 150 - 450 x10E9/L 316   Neutrophils % 40.0 - 80.0 % 71.6   Immature Granulocytes %, Automated 0.0 - 0.9 % 0.6   Lymphocytes % 13.0 - 44.0 % 19.0   Monocytes % 2.0 - 10.0 % 8.4   Basophils % 0.0 - 2.0 % 0.4   Neutrophils Absolute 1.20 - 7.70 x10E9/L 5.06   Lymphocytes Absolute 1.20 - 4.80 x10E9/L 1.34   Monocytes Absolute 0.10 - 1.00  x10E9/L 0.59   Basophils Absolute 0.00 - 0.10 x10E9/L 0.03      [facility] Reference Range & Units 12/09/22 17:04   WBC 4.4 - 11.3 x10E9/L 5.4   nRBC 0.0 - 0.0 /100 WBC 0.0   RBC 4.00 - 5.20 x10E12/L 4.67   HEMOGLOBIN 12.0 - 16.0 g/dL 14.1   HEMATOCRIT 36.0 - 46.0 % 41.8   MCV 80 - 100 fL 90   MCHC 32.0 - 36.0 g/dL 33.7   RED CELL DISTRIBUTION WIDTH 11.5 - 14.5 % 11.9   Platelets 150 - 450 x10E9/L 324   Neutrophils % 40.0 - 80.0 % 46.4   Immature Granulocytes %, Automated 0.0 - 0.9 % 0.4   Lymphocytes % 13.0 - 44.0 % 36.7   Monocytes % 2.0 - 10.0 % 9.8   Eosinophils % 0.0 - 6.0 % 5.6   Basophils % 0.0 - 2.0 % 1.1   Neutrophils Absolute 1.20 - 7.70 x10E9/L 2.51   Lymphocytes Absolute 1.20 - 4.80 x10E9/L 1.98   Monocytes Absolute 0.10 - 1.00 x10E9/L 0.53   Eosinophils Absolute 0.00 - 0.70 x10E9/L 0.30   Basophils Absolute 0.00 - 0.10 x10E9/L 0.06      [facility] Reference Range & Units 03/11/22 13:35   WBC 4.4 - 11.3 x10E9/L 4.5   nRBC 0.0 - 0.0 /100 WBC 0.0   RBC 4.00 - 5.20 x10E12/L 4.65   HEMOGLOBIN 12.0 - 16.0 g/dL 15.2   HEMATOCRIT 36.0 - 46.0 % 44.5   MCV 80 - 100 fL 96   MCHC 32.0 - 36.0 g/dL 34.2   RED CELL DISTRIBUTION WIDTH 11.5 - 14.5 % 12.4   Platelets 150 - 450 x10E9/L 269   Neutrophils % 40.0 - 80.0 % 49.1   Immature Granulocytes %, Automated 0.0 - 0.9 % 0.4   Lymphocytes % 13.0 - 44.0 % 35.2   Monocytes % 2.0 - 10.0 % 10.0   Eosinophils % 0.0 - 6.0 % 4.4   Basophils % 0.0 - 2.0 % 0.9   Neutrophils Absolute 1.20 - 7.70 x10E9/L 2.22   Lymphocytes Absolute 1.20 - 4.80 x10E9/L 1.59   Monocytes Absolute 0.10 - 1.00 x10E9/L 0.45   Eosinophils Absolute 0.00 - 0.70 x10E9/L 0.20   Basophils Absolute 0.00 - 0.10 x10E9/L 0.04        Latest Reference Range & Units 03/11/22 13:35   WBC 4.4 - 11.3 x10E9/L 4.5   nRBC 0.0 - 0.0 /100 WBC 0.0   RBC 4.00 - 5.20 x10E12/L 4.65   HEMOGLOBIN 12.0 - 16.0 g/dL 15.2   HEMATOCRIT 36.0 - 46.0 % 44.5   MCV 80 - 100 fL 96   MCHC 32.0 - 36.0 g/dL 34.2   RED CELL DISTRIBUTION WIDTH 11.5 -  14.5 % 12.4   Platelets 150 - 450 x10E9/L 269   Neutrophils % 40.0 - 80.0 % 49.1   Immature Granulocytes %, Automated 0.0 - 0.9 % 0.4   Lymphocytes % 13.0 - 44.0 % 35.2   Monocytes % 2.0 - 10.0 % 10.0   Eosinophils % 0.0 - 6.0 % 4.4   Basophils % 0.0 - 2.0 % 0.9   Neutrophils Absolute 1.20 - 7.70 x10E9/L 2.22   Lymphocytes Absolute 1.20 - 4.80 x10E9/L 1.59   Monocytes Absolute 0.10 - 1.00 x10E9/L 0.45   Eosinophils Absolute 0.00 - 0.70 x10E9/L 0.20   Basophils Absolute 0.00 - 0.10 x10E9/L 0.04      Latest Reference Range & Units 11/15/21 08:34   Immunocap IgE 0.0 - 214.0 KU/L 521.0 (H)   Gee Grass IgE <0.35 KU/L <0.35   Maple [place], [place] IgE <0.35 KU/L <0.35   Meadow Grass, [place] Blue IgE <0.35 KU/L <0.35   Mountain Juniper IgE <0.35 KU/L <0.35   [place] IgE <0.35 KU/L <0.35   Oak IgE <0.35 KU/L <0.35   Pecan, [place] IgE <0.35 KU/L <0.35   Penicillium Chrysogenum (P. notatum) IgE <0.35 KU/L <0.35   Prickly Saltwort/Russian Thistle IgE <0.35 KU/L <0.35   Sheep Sorrel IgE <0.35 KU/L <0.35   Ifeanyi Grass IgE <0.35 KU/L <0.35   [place] IgE <0.35 KU/L 0.36 !   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      IgE 500 2022, 347 2023, neg aspergillus iGe and Igg  Eosinos 300   And ct chest  with small airways dz atypical infectious process.  A  component of organizing pneumonia is again not excluded.    Scratch skin tests were negative.    Pmhx:   Lost 30 lbs   She has been able to stop the hypertension meds (now on 5mg) and now  exercising 5 days/week.      Assessment/Plan   Diagnoses and all orders for this visit:  Chronic rhinitis  Moderate persistent asthmatic bronchitis with acute exacerbation  -     Referral to Allergy  Severe persistent asthma with acute exacerbation  -     Referral to Allergy    We performed allergy testing to help determine the etiology of your symptoms. We discussed the results of the testing. Also, we started to focus on treating your problem and we reviewed the management plan.    This patient will return to complete allergy testing with intradermal skin tests. Be sure to be off antihistamines for 3 days.    Pulmonary meds and biologicals : per pulmonary    Continue the Astelin and flonase     Of note, she has sleepiness with allegra and zyrtec and no benefit with claritin    Junior Clay MD

## 2024-03-29 ENCOUNTER — PHARMACY VISIT (OUTPATIENT)
Dept: PHARMACY | Facility: CLINIC | Age: 54
End: 2024-03-29
Payer: COMMERCIAL

## 2024-03-29 ENCOUNTER — OFFICE VISIT (OUTPATIENT)
Dept: ENDOCRINOLOGY | Facility: CLINIC | Age: 54
End: 2024-03-29
Payer: COMMERCIAL

## 2024-03-29 VITALS
BODY MASS INDEX: 28.7 KG/M2 | WEIGHT: 152 LBS | SYSTOLIC BLOOD PRESSURE: 128 MMHG | HEIGHT: 61 IN | DIASTOLIC BLOOD PRESSURE: 79 MMHG | RESPIRATION RATE: 18 BRPM | TEMPERATURE: 98.8 F | HEART RATE: 78 BPM

## 2024-03-29 DIAGNOSIS — E11.69 HYPERLIPIDEMIA DUE TO TYPE 2 DIABETES MELLITUS (MULTI): ICD-10-CM

## 2024-03-29 DIAGNOSIS — K21.9 GASTROESOPHAGEAL REFLUX DISEASE WITHOUT ESOPHAGITIS: ICD-10-CM

## 2024-03-29 DIAGNOSIS — I10 HYPERTENSION, UNSPECIFIED TYPE: ICD-10-CM

## 2024-03-29 DIAGNOSIS — E78.5 HYPERLIPIDEMIA DUE TO TYPE 2 DIABETES MELLITUS (MULTI): ICD-10-CM

## 2024-03-29 DIAGNOSIS — E11.9 TYPE 2 DIABETES MELLITUS WITHOUT COMPLICATION, UNSPECIFIED WHETHER LONG TERM INSULIN USE (MULTI): Primary | ICD-10-CM

## 2024-03-29 DIAGNOSIS — E66.3 OVERWEIGHT WITH BODY MASS INDEX (BMI) OF 28 TO 28.9 IN ADULT: ICD-10-CM

## 2024-03-29 LAB — POC HEMOGLOBIN A1C: 5.7 % (ref 4.2–6.5)

## 2024-03-29 PROCEDURE — 3008F BODY MASS INDEX DOCD: CPT | Performed by: NURSE PRACTITIONER

## 2024-03-29 PROCEDURE — 3078F DIAST BP <80 MM HG: CPT | Performed by: NURSE PRACTITIONER

## 2024-03-29 PROCEDURE — 83036 HEMOGLOBIN GLYCOSYLATED A1C: CPT | Performed by: NURSE PRACTITIONER

## 2024-03-29 PROCEDURE — RXMED WILLOW AMBULATORY MEDICATION CHARGE

## 2024-03-29 PROCEDURE — 3074F SYST BP LT 130 MM HG: CPT | Performed by: NURSE PRACTITIONER

## 2024-03-29 PROCEDURE — 1036F TOBACCO NON-USER: CPT | Performed by: NURSE PRACTITIONER

## 2024-03-29 PROCEDURE — 99214 OFFICE O/P EST MOD 30 MIN: CPT | Performed by: NURSE PRACTITIONER

## 2024-03-29 RX ORDER — ROSUVASTATIN CALCIUM 20 MG/1
20 TABLET, COATED ORAL DAILY
Qty: 90 TABLET | Refills: 0 | Status: SHIPPED | OUTPATIENT
Start: 2024-03-29 | End: 2024-03-29 | Stop reason: SDUPTHER

## 2024-03-29 RX ORDER — ROSUVASTATIN CALCIUM 20 MG/1
20 TABLET, COATED ORAL DAILY
Qty: 90 TABLET | Refills: 3 | Status: SHIPPED | OUTPATIENT
Start: 2024-03-29

## 2024-03-29 ASSESSMENT — ENCOUNTER SYMPTOMS
POLYPHAGIA: 0
DIARRHEA: 0
FREQUENCY: 0
NUMBNESS: 0
POLYDIPSIA: 0
CONSTIPATION: 0
DIZZINESS: 0
NERVOUS/ANXIOUS: 0
PALPITATIONS: 0
SLEEP DISTURBANCE: 0
APPETITE CHANGE: 0
FATIGUE: 0
WEAKNESS: 0
NAUSEA: 0
SHORTNESS OF BREATH: 0
ACTIVITY CHANGE: 0
SEIZURES: 0

## 2024-03-29 NOTE — PATIENT INSTRUCTIONS
Type 2 diabetes mellitus, is at goal. A1C 5.7%    RX changes:   Ozempic 0.5 mg weekly. Discussed increase to 1 mg weekly if A1C >6%  Education:  interpretation of lab results, blood sugar goals, and complications of diabetes mellitus  BMI 28: Exercise: Move every day with goal of going to gym 5 days a week. Continue meal planning. You are doing an excellent job!   Hyperlipidemia: At goal. No changes  Hypertension: At goal. No changes.   Follow up: I recommend diabetes care be 6 months

## 2024-03-29 NOTE — PROGRESS NOTES
"Sisi Cool is a 53 y.o. female who presents for initial visit for evaluation of Type 2 diabetes mellitus. The initial diagnosis of diabetes was made  April 2023 .     She was on steroids for a week from an asthma attack and sinus infection in October 2023    Known complications due to diabetes included none    Cardiovascular risk factors include diabetes mellitus. The patient is not on an ACE inhibitor or angiotensin II receptor blocker.      Current diabetes regimen is as follows:   Ozempic 0.5 mg weekly    The patient is currently checking the blood glucose 4 times per day.  Patient is using: glucometer    Hypoglycemia frequency: 0%  Hypoglycemia awareness: Yes     Exercise: daily   Meal panning: She is using avoidance of concentrated sweets.    Review of Systems   Constitutional:  Negative for activity change, appetite change and fatigue.   Respiratory:  Negative for shortness of breath.    Cardiovascular:  Negative for chest pain, palpitations and leg swelling.   Gastrointestinal:  Negative for constipation, diarrhea and nausea.   Endocrine: Negative for cold intolerance, heat intolerance, polydipsia, polyphagia and polyuria.   Genitourinary:  Negative for frequency.   Musculoskeletal:  Negative for gait problem.   Skin:  Negative for rash.   Neurological:  Negative for dizziness, seizures, weakness and numbness.   Psychiatric/Behavioral:  Negative for sleep disturbance and suicidal ideas. The patient is not nervous/anxious.        Objective   /79 (BP Location: Right arm, Patient Position: Sitting, BP Cuff Size: Adult)   Pulse 78   Temp 37.1 °C (98.8 °F) (Tympanic)   Resp 18   Ht 1.549 m (5' 1\")   Wt 68.9 kg (152 lb)   BMI 28.72 kg/m²   Physical Exam  Pulmonary:      Effort: Pulmonary effort is normal.   Skin:     General: Skin is warm and dry.   Neurological:      Mental Status: She is alert.   Psychiatric:         Mood and Affect: Mood normal.         Behavior: Behavior " normal.         Thought Content: Thought content normal.         Judgment: Judgment normal.         Lab Review  Glucose   Date Value   07/01/2023 95 mg/dL   03/19/2023 CANCELED   03/18/2023 156 mg/dL (H)     POC HEMOGLOBIN A1c (%)   Date Value   03/29/2024 5.7   11/21/2023 6.2   06/12/2023 6.0     Hemoglobin A1C (%)   Date Value   07/01/2023 5.9 (A)     Bicarbonate   Date Value   07/01/2023 26 mmol/L   03/19/2023 CANCELED   03/18/2023 27 mmol/L     Urea Nitrogen   Date Value   07/01/2023 16 mg/dL   03/19/2023 CANCELED   03/18/2023 14 mg/dL     Creatinine   Date Value   07/01/2023 0.57 mg/dL   03/19/2023 CANCELED   03/18/2023 0.57 mg/dL       Health Maintenance:   Foot Exam: None  Eye Exam: may 2023, no retinopathy  Lipid Panel: LDL 69 July 2023  Urine Albumin: < 7 April 2023    Assessment/Plan   Diagnoses and all orders for this visit:  Type 2 diabetes mellitus without complication, unspecified whether long term insulin use (CMS/[facility])  -     POCT glycosylated hemoglobin (Hb A1C) manually resulted  Hyperlipidemia due to type 2 diabetes mellitus (CMS/[facility])  -     rosuvastatin (Crestor) 20 mg tablet; TAKE 1 TABLET BY MOUTH ONCE DAILY  Hypertension, unspecified type  Overweight with body mass index (BMI) of 28 to 28.9 in adult    Type 2 diabetes mellitus, is at goal. A1C 5.7%    RX changes:   Ozempic 0.5 mg weekly. Discussed increase to 1 mg weekly if A1C >6%  Education:  interpretation of lab results, blood sugar goals, and complications of diabetes mellitus  BMI 28: Exercise: Move every day with goal of going to gym 5 days a week. Continue meal planning. You are doing an excellent job!   Hyperlipidemia: At goal. No changes  Hypertension: At goal. No changes.   Follow up: I recommend diabetes care be 6 months

## 2024-04-05 ENCOUNTER — SPECIALTY PHARMACY (OUTPATIENT)
Dept: PHARMACY | Facility: CLINIC | Age: 54
End: 2024-04-05

## 2024-04-05 PROCEDURE — RXMED WILLOW AMBULATORY MEDICATION CHARGE

## 2024-04-08 ENCOUNTER — PHARMACY VISIT (OUTPATIENT)
Dept: PHARMACY | Facility: CLINIC | Age: 54
End: 2024-04-08
Payer: COMMERCIAL

## 2024-04-24 ENCOUNTER — PHARMACY VISIT (OUTPATIENT)
Dept: PHARMACY | Facility: CLINIC | Age: 54
End: 2024-04-24
Payer: COMMERCIAL

## 2024-04-24 ENCOUNTER — OFFICE VISIT (OUTPATIENT)
Dept: OTOLARYNGOLOGY | Facility: CLINIC | Age: 54
End: 2024-04-24
Payer: COMMERCIAL

## 2024-04-24 VITALS — WEIGHT: 152 LBS | BODY MASS INDEX: 28.7 KG/M2 | HEIGHT: 61 IN

## 2024-04-24 DIAGNOSIS — R04.0 EPISTAXIS: Primary | ICD-10-CM

## 2024-04-24 PROCEDURE — 3008F BODY MASS INDEX DOCD: CPT | Performed by: OTOLARYNGOLOGY

## 2024-04-24 PROCEDURE — RXMED WILLOW AMBULATORY MEDICATION CHARGE

## 2024-04-24 PROCEDURE — 1036F TOBACCO NON-USER: CPT | Performed by: OTOLARYNGOLOGY

## 2024-04-24 PROCEDURE — 99213 OFFICE O/P EST LOW 20 MIN: CPT | Performed by: OTOLARYNGOLOGY

## 2024-04-24 PROCEDURE — 31231 NASAL ENDOSCOPY DX: CPT | Performed by: OTOLARYNGOLOGY

## 2024-04-24 RX ORDER — MUPIROCIN 20 MG/G
1 OINTMENT TOPICAL
Qty: 22 G | Refills: 0 | Status: SHIPPED | OUTPATIENT
Start: 2024-04-24 | End: 2024-05-24

## 2024-04-24 ASSESSMENT — PATIENT HEALTH QUESTIONNAIRE - PHQ9
SUM OF ALL RESPONSES TO PHQ9 QUESTIONS 1 AND 2: 0
1. LITTLE INTEREST OR PLEASURE IN DOING THINGS: NOT AT ALL
2. FEELING DOWN, DEPRESSED OR HOPELESS: NOT AT ALL

## 2024-04-24 NOTE — PROGRESS NOTES
"Chief Complaint:  Follow up rhinitis    Referring Provider: No ref. provider found    History of Present Illness:    Edmond Cool is a 53 y.o. female, presenting for evaluation of rhinitis.  She states that when she blows her nose she is also using the tissue to wipe the nasal septum which resulted in bleeding and irritation.  She is using her Flonase and Astelin as recommended.  She is not using her saline irrigations and states that she only use them 1 time after her last appointment.  No other new issues.    ?  Review of Systems:    Review of symptoms was negative except for those stated including Cardiopulmonary, Genitourinary, Gastrointestinal, Psychological, Sleep pattern, Endocrine, Eyes, Neurologic, Musculoskeletal, Skin, Hematologic/Lymphatic and Allergic/Immunologic.     Medical History:    I have reviewed the patient's updated past medical history, surgical history, family history, social history, as well as current medications and allergies as of 4/24/2024. Changes to these items have been updated and marked as reviewed in the electronic medical record.    Physical Exam:    Vitals:  height is 1.549 m (5' 1\") and weight is 68.9 kg (152 lb).   General: Patient doing well overall and is in no apparent distress.  Psych: Pleasant affect, and answers questions appropriately.  Head & Face: Symmetric facial movements  Eyes: Pupils equal, round, reactive.  Extraocular movements intact without gaze restrictions or nystagmus. No epiphora.  Ears:  External auditory canals are normal.  Tympanic membranes are clear.  No middle ear effusion is seen.  All middle ear landmarks are normal.  Nose: Anterior rhinoscopy revealed normal sinonasal mucosa. More posterior areas of the nasal cavity could not be completely examined.  Oral Cavity/Oropharynx:  Without lesions or masses to visual exam.  Neck: Supple without lymphadenopathy.  Lungs: Non-labored, and without evidence of stridor.  Cardiac: Pulses are strong, " well-perfused.  Extremities: Without gross evidence of clubbing, cyanosis, or edema.  Neuro: Cranial nerves II-XII grossly intact; Intact facial movements.    Procedure:  Rigid nasal endoscopy (28269)  Pre-procedure diagnosis/Indication for procedure:  To evaluate areas not visualized on anterior rhinoscopy   Anesthesia:  None  Description:  A 0-degree 3-mm rigid nasal endoscope was used to examine the left and right nasal cavities.  The nasal valve areas were examined for abnormalities or collapse.  The inferior and middle turbinates were evaluated.  The middle and superior meatuses, and the sphenoethmoid recesses were examined and inspected for mucopurulence and polyps. Once the endoscope was withdrawn, the patient was noted to have tolerated the procedure well without complications and was returned to ambulatory status.    Findings:    The anterior septum is dry, excoriated and covered with a yellow-brown rind which appears consistent with a vestibulitis.  The remainder of the sinonasal cavity is free of polyps lesions and purulence.  The inferior turbinates are moderate hypertrophy.  There is generalized edema throughout the sinonasal cavity.      Assessment:     Edmond Cool is a 53 y.o. female with chronic rhinitis, vestibulitis    Plan:      Mupirocin ointment for 2 weeks  I reminded her of the importance of not traumatizing nasal mucosa and to be gentle when blowing her nose.  I advised her after finishing the mupirocin ointment she should start using saline gel as an emollient.  I asked her to discontinue her Flonase to minimize the chances that she is over drying her nose.  She will continue with Astelin.  She will follow-up in 6 weeks or sooner if needed.      Jacinto Pacheco MD, M.Eng.   of Otolaryngology - Head & Neck Surgery  Division of Rhinology and Endoscopic Skull Base Surgery  [facility]

## 2024-05-02 PROCEDURE — RXMED WILLOW AMBULATORY MEDICATION CHARGE

## 2024-05-03 ENCOUNTER — PHARMACY VISIT (OUTPATIENT)
Dept: PHARMACY | Facility: CLINIC | Age: 54
End: 2024-05-03
Payer: COMMERCIAL

## 2024-05-03 ENCOUNTER — SPECIALTY PHARMACY (OUTPATIENT)
Dept: PHARMACY | Facility: CLINIC | Age: 54
End: 2024-05-03

## 2024-05-03 PROCEDURE — RXMED WILLOW AMBULATORY MEDICATION CHARGE

## 2024-05-07 ENCOUNTER — OFFICE VISIT (OUTPATIENT)
Dept: OBSTETRICS AND GYNECOLOGY | Facility: CLINIC | Age: 54
End: 2024-05-07
Payer: COMMERCIAL

## 2024-05-07 VITALS
WEIGHT: 152 LBS | BODY MASS INDEX: 28.7 KG/M2 | DIASTOLIC BLOOD PRESSURE: 83 MMHG | HEIGHT: 61 IN | SYSTOLIC BLOOD PRESSURE: 121 MMHG

## 2024-05-07 DIAGNOSIS — N95.1 MENOPAUSAL SYMPTOMS: Primary | ICD-10-CM

## 2024-05-07 PROCEDURE — 3079F DIAST BP 80-89 MM HG: CPT | Performed by: NURSE PRACTITIONER

## 2024-05-07 PROCEDURE — 3008F BODY MASS INDEX DOCD: CPT | Performed by: NURSE PRACTITIONER

## 2024-05-07 PROCEDURE — 99212 OFFICE O/P EST SF 10 MIN: CPT | Performed by: NURSE PRACTITIONER

## 2024-05-07 PROCEDURE — 1036F TOBACCO NON-USER: CPT | Performed by: NURSE PRACTITIONER

## 2024-05-07 PROCEDURE — 3074F SYST BP LT 130 MM HG: CPT | Performed by: NURSE PRACTITIONER

## 2024-05-07 RX ORDER — PAROXETINE 7.5 MG/1
7.5 CAPSULE ORAL DAILY
Qty: 30 CAPSULE | Refills: 2 | Status: SHIPPED | OUTPATIENT
Start: 2024-05-07 | End: 2024-05-14

## 2024-05-07 ASSESSMENT — ENCOUNTER SYMPTOMS
NERVOUS/ANXIOUS: 1
CONSTITUTIONAL NEGATIVE: 0
HEMATOLOGIC/LYMPHATIC NEGATIVE: 0
RESPIRATORY NEGATIVE: 0
MUSCULOSKELETAL NEGATIVE: 1
NEUROLOGICAL NEGATIVE: 0
CONSTITUTIONAL NEGATIVE: 1
EYES NEGATIVE: 0
ENDOCRINE NEGATIVE: 0
NEUROLOGICAL NEGATIVE: 1
HEMATOLOGIC/LYMPHATIC NEGATIVE: 1
EYES NEGATIVE: 1
ALLERGIC/IMMUNOLOGIC NEGATIVE: 1
RESPIRATORY NEGATIVE: 1
CARDIOVASCULAR NEGATIVE: 1
PSYCHIATRIC NEGATIVE: 0
ALLERGIC/IMMUNOLOGIC NEGATIVE: 0
SLEEP DISTURBANCE: 1
CARDIOVASCULAR NEGATIVE: 0
GASTROINTESTINAL NEGATIVE: 0
MUSCULOSKELETAL NEGATIVE: 0
GASTROINTESTINAL NEGATIVE: 1

## 2024-05-07 ASSESSMENT — PAIN SCALES - GENERAL: PAINLEVEL: 0-NO PAIN

## 2024-05-07 NOTE — PROGRESS NOTES
Subjective   Patient ID: Edmond Cool is a 53 y.o. female who presents for Menopause (Patient here for menopause last pap 11/4/22 wnl HPV negative last mammogram 1/23/24 benign ).  HPI patient is a 53-year-old here with perimenopausal complaints.  Primary complaints are reviewed changes with trouble doing as well    She has difficulties last year she went 6 months with no menstrual bleeding before.  Started again she has not bled since January 2024.  She is awaiting that menopause will be considered after 12 consecutive months of no bleeding    Over the past few years patient has made many lifestyle changes including alcohol, dietary changes and weight loss, control of her diabetes and regular exercise.    Review of Systems   Constitutional: Negative.    HENT: Negative.     Eyes: Negative.    Respiratory: Negative.     Cardiovascular: Negative.    Gastrointestinal: Negative.    Endocrine: Positive for heat intolerance.   Genitourinary: Negative.    Musculoskeletal: Negative.    Skin: Negative.    Allergic/Immunologic: Negative.    Neurological: Negative.    Hematological: Negative.    Psychiatric/Behavioral:  Positive for sleep disturbance. The patient is nervous/anxious.    All other systems reviewed and are negative.      Objective   Physical Exam deferred        Assessment/Plan   Problem List Items Addressed This Visit             ICD-10-CM    Menopausal symptoms - Primary N95.1    Relevant Medications    PARoxetine mesylate,menop.sym, (Brisdelle) 7.5 mg capsule     Patient will begin [place] and evaluate changes in mood as well as hot flashes.  She will give feedback at the 3-month trial kriss.       Sharon Tay, GOLDEN-CNP 05/07/24 4:17 PM

## 2024-05-10 ENCOUNTER — SPECIALTY PHARMACY (OUTPATIENT)
Dept: PHARMACY | Facility: CLINIC | Age: 54
End: 2024-05-10

## 2024-05-10 NOTE — PROGRESS NOTES
[facility] Clinical Note    Edmond Cool is a 53 y.o. female, who is on the specialty pharmacy service of: Asthma/Allergy Core, Edmond Cool is taking Dupixent.    Edmond was contacted on 5/10/2024.    Refer to the encounter summary report for documentation details about patient counseling and education.      Reassessment  Patient feels that their medication is currently working effectively but still experiences some wheezing. She reported mild injection site reactions (common) and no missed doses.    Impression/Plan  Is patient high risk? (potential patients: pregnancy, geriatric, pediatric) - No  Is laboratory follow-up needed? Per MD  Is a clinical intervention needed? No  Next assessment date? 3 months    Medication Adherence  The importance of adherence was discussed with the patient and they were advised to take the medication as prescribed by their provider. Edmond was encouraged to call her physician's office if they have a question regarding a missed dose.     QOL/Patient Satisfaction  Rate your quality of life on scale of 1-10: 9  Rate your satisfaction with  Specialty Pharmacy on scale of 1-10: 10 - Completely satisfied    Patient advised to contact the pharmacy if there are any changes to her medication list, including prescriptions, OTC medications, herbal products, or supplements. Patient was advised of [facility]’s dispensing process, refill timeline, contact information ([phone]), and patient management follow up. Patient confirmed understanding of education conducted during assessment. All patient questions and concerns were addressed to the best of my ability. Patient was encouraged to contact the specialty pharmacy with any questions or concerns.    Confirmed follow-up outreaches are properly scheduled. Reviewed goals of therapy in the program targets.    Gwen Mitchell, PharmD

## 2024-05-14 ENCOUNTER — OFFICE VISIT (OUTPATIENT)
Dept: PRIMARY CARE | Facility: CLINIC | Age: 54
End: 2024-05-14
Payer: COMMERCIAL

## 2024-05-14 VITALS
HEART RATE: 74 BPM | BODY MASS INDEX: 29.07 KG/M2 | SYSTOLIC BLOOD PRESSURE: 115 MMHG | WEIGHT: 154 LBS | HEIGHT: 61 IN | DIASTOLIC BLOOD PRESSURE: 70 MMHG

## 2024-05-14 DIAGNOSIS — E78.49 ESSENTIAL FAMILIAL HYPERLIPIDEMIA: ICD-10-CM

## 2024-05-14 DIAGNOSIS — F41.9 ANXIETY: ICD-10-CM

## 2024-05-14 DIAGNOSIS — E11.69 TYPE 2 DIABETES MELLITUS WITH OTHER SPECIFIED COMPLICATION, UNSPECIFIED WHETHER LONG TERM INSULIN USE (MULTI): ICD-10-CM

## 2024-05-14 DIAGNOSIS — I10 BENIGN ESSENTIAL HYPERTENSION: Primary | ICD-10-CM

## 2024-05-14 DIAGNOSIS — M25.561 RIGHT KNEE PAIN, UNSPECIFIED CHRONICITY: ICD-10-CM

## 2024-05-14 DIAGNOSIS — F32.A DEPRESSION, UNSPECIFIED DEPRESSION TYPE: Primary | ICD-10-CM

## 2024-05-14 DIAGNOSIS — M72.2 PLANTAR FASCIITIS: ICD-10-CM

## 2024-05-14 PROBLEM — R16.0 HEPATOMEGALY: Status: RESOLVED | Noted: 2023-03-17 | Resolved: 2024-05-14

## 2024-05-14 PROBLEM — F10.21 HISTORY OF ALCOHOLISM (MULTI): Status: RESOLVED | Noted: 2023-03-17 | Resolved: 2024-05-14

## 2024-05-14 PROBLEM — E66.9 OBESITY, UNSPECIFIED: Status: RESOLVED | Noted: 2023-03-18 | Resolved: 2024-05-14

## 2024-05-14 PROBLEM — J34.89 NASAL OBSTRUCTION: Status: ACTIVE | Noted: 2024-05-14

## 2024-05-14 PROBLEM — R51.9 HEADACHE, TEMPORAL: Status: RESOLVED | Noted: 2023-03-17 | Resolved: 2024-05-14

## 2024-05-14 PROBLEM — R04.0 EPISTAXIS: Status: ACTIVE | Noted: 2024-05-14

## 2024-05-14 PROBLEM — H52.13 MYOPIA WITH PRESBYOPIA OF BOTH EYES: Status: RESOLVED | Noted: 2023-03-17 | Resolved: 2024-05-14

## 2024-05-14 PROBLEM — E55.9 VITAMIN D DEFICIENCY: Status: RESOLVED | Noted: 2023-03-17 | Resolved: 2024-05-14

## 2024-05-14 PROBLEM — N92.6 IRREGULAR MENSTRUAL CYCLE: Status: RESOLVED | Noted: 2023-03-17 | Resolved: 2024-05-14

## 2024-05-14 PROBLEM — H52.4 MYOPIA WITH PRESBYOPIA OF BOTH EYES: Status: RESOLVED | Noted: 2023-03-17 | Resolved: 2024-05-14

## 2024-05-14 PROBLEM — K70.0 FATTY LIVER, ALCOHOLIC: Status: RESOLVED | Noted: 2023-03-17 | Resolved: 2024-05-14

## 2024-05-14 PROCEDURE — 3078F DIAST BP <80 MM HG: CPT | Performed by: FAMILY MEDICINE

## 2024-05-14 PROCEDURE — RXMED WILLOW AMBULATORY MEDICATION CHARGE

## 2024-05-14 PROCEDURE — 1036F TOBACCO NON-USER: CPT | Performed by: FAMILY MEDICINE

## 2024-05-14 PROCEDURE — 3074F SYST BP LT 130 MM HG: CPT | Performed by: FAMILY MEDICINE

## 2024-05-14 PROCEDURE — 99214 OFFICE O/P EST MOD 30 MIN: CPT | Performed by: FAMILY MEDICINE

## 2024-05-14 PROCEDURE — 3008F BODY MASS INDEX DOCD: CPT | Performed by: FAMILY MEDICINE

## 2024-05-14 RX ORDER — PAROXETINE 10 MG/1
TABLET, FILM COATED ORAL
COMMUNITY
End: 2024-05-14 | Stop reason: SDUPTHER

## 2024-05-14 RX ORDER — MELOXICAM 15 MG/1
TABLET ORAL
COMMUNITY
End: 2024-05-14 | Stop reason: SDUPTHER

## 2024-05-14 RX ORDER — PAROXETINE 10 MG/1
10 TABLET, FILM COATED ORAL EVERY MORNING
Qty: 90 TABLET | Refills: 0 | Status: SHIPPED | OUTPATIENT
Start: 2024-05-14

## 2024-05-14 RX ORDER — MELOXICAM 15 MG/1
15 TABLET ORAL DAILY
Qty: 14 TABLET | Refills: 0 | Status: CANCELLED | OUTPATIENT
Start: 2024-05-14

## 2024-05-14 RX ORDER — PAROXETINE 10 MG/1
10 TABLET, FILM COATED ORAL EVERY MORNING
Qty: 90 TABLET | Refills: 0 | Status: CANCELLED | OUTPATIENT
Start: 2024-05-14

## 2024-05-14 RX ORDER — MELOXICAM 15 MG/1
15 TABLET ORAL DAILY
Qty: 14 TABLET | Refills: 0 | Status: SHIPPED | OUTPATIENT
Start: 2024-05-14

## 2024-05-14 ASSESSMENT — ENCOUNTER SYMPTOMS
LOSS OF SENSATION IN FEET: 0
MUSCULOSKELETAL NEGATIVE: 1
CONSTITUTIONAL NEGATIVE: 1
GASTROINTESTINAL NEGATIVE: 1
RESPIRATORY NEGATIVE: 1
OCCASIONAL FEELINGS OF UNSTEADINESS: 0
CARDIOVASCULAR NEGATIVE: 1
NEUROLOGICAL NEGATIVE: 1
DEPRESSION: 0

## 2024-05-14 NOTE — PROGRESS NOTES
Pt comes in for fu. Pt she just saw her endo and had her A1c done. Pt states she wants a referral to a podiatrist for plantar fascitis.

## 2024-05-14 NOTE — PROGRESS NOTES
"Subjective   Patient ID: Edmond Cool is a 53 y.o. female who presents for No chief complaint on file..    HPI plantar fascitital pain , htn, anxiety increased over recent passing of brother, chol    Review of Systems   Constitutional: Negative.    HENT: Negative.     Respiratory: Negative.     Cardiovascular: Negative.    Gastrointestinal: Negative.    Musculoskeletal: Negative.    Neurological: Negative.        Objective   /70   Pulse 74   Ht 1.549 m (5' 1\")   Wt 69.9 kg (154 lb)   LMP  (LMP Unknown)   BMI 29.10 kg/m²     Physical Exam  Vitals reviewed.   Constitutional:       Appearance: Normal appearance. She is normal weight.   Eyes:      Extraocular Movements: Extraocular movements intact.      Conjunctiva/sclera: Conjunctivae normal.      Pupils: Pupils are equal, round, and reactive to light.   Cardiovascular:      Rate and Rhythm: Normal rate and regular rhythm.      Pulses: Normal pulses.      Heart sounds: Normal heart sounds.   Pulmonary:      Effort: Pulmonary effort is normal.      Breath sounds: Normal breath sounds.   Abdominal:      General: Bowel sounds are normal.      Palpations: Abdomen is soft.   Musculoskeletal:         General: Normal range of motion.   Skin:     General: Skin is warm and dry.   Neurological:      General: No focal deficit present.      Mental Status: She is alert and oriented to person, place, and time. Mental status is at baseline.         Assessment/Plan   Problem List Items Addressed This Visit             ICD-10-CM    Anxiety F41.9    Benign essential hypertension - Primary I10    Essential familial hyperlipidemia E78.49    Plantar fasciitis M72.2     Anxiety will start paxil 10 as the brisdelle 7.5 is too expensive  Htn stable ccc  Chol pt taking med regularly will be due for check in July  Plantar fascitis- stretches and mobic for 2 wks, will call in      "

## 2024-05-14 NOTE — PROGRESS NOTES
Verbal discussion with Dr. Skelton.  Clinically agreed to start Paxil 10 mg dispense 90 and refill meloxicam 15 mg dispense 14.

## 2024-05-16 ENCOUNTER — PHARMACY VISIT (OUTPATIENT)
Dept: PHARMACY | Facility: CLINIC | Age: 54
End: 2024-05-16
Payer: COMMERCIAL

## 2024-05-20 ENCOUNTER — PHARMACY VISIT (OUTPATIENT)
Dept: PHARMACY | Facility: CLINIC | Age: 54
End: 2024-05-20
Payer: COMMERCIAL

## 2024-05-20 PROCEDURE — RXMED WILLOW AMBULATORY MEDICATION CHARGE

## 2024-05-28 PROCEDURE — RXMED WILLOW AMBULATORY MEDICATION CHARGE

## 2024-05-29 ENCOUNTER — PHARMACY VISIT (OUTPATIENT)
Dept: PHARMACY | Facility: CLINIC | Age: 54
End: 2024-05-29
Payer: COMMERCIAL

## 2024-05-29 ENCOUNTER — TELEPHONE (OUTPATIENT)
Dept: PULMONOLOGY | Facility: HOSPITAL | Age: 54
End: 2024-05-29
Payer: COMMERCIAL

## 2024-05-29 DIAGNOSIS — J45.51 SEVERE PERSISTENT ASTHMA WITH ACUTE EXACERBATION (MULTI): ICD-10-CM

## 2024-05-29 NOTE — TELEPHONE ENCOUNTER
Pt reached out via [employer] message regarding the need of a new prescription for disability placard. Order was placed. Tried to call pt at [phone] regarding the placard, but was unsuccessful. Voicemail left with instructions to return the call at [phone].

## 2024-05-31 ENCOUNTER — SPECIALTY PHARMACY (OUTPATIENT)
Dept: PHARMACY | Facility: CLINIC | Age: 54
End: 2024-05-31

## 2024-05-31 PROCEDURE — RXMED WILLOW AMBULATORY MEDICATION CHARGE

## 2024-06-02 NOTE — PROGRESS NOTES
Nutrition: Initial Assessment    Reason for Nutrition Visit: Patient is a 53 y.o. female referred for T2DM. Referred on 24 by Dr. David Skelton.       Nutrition Assessment    Food & Nutrition Related History: Pt presents in office.    Dietary Considerations: {dietaryconsiderations (Optional):43701}  {Allergies:39862}  {Intolerance:62793}  {Appetite:46018}  {Intake:72141}  {GI Symptoms (Optional):76748}  {Swallowing Difficulty:88036}  {Dentition (Optional):77416}  {Food Preparation:77486}  {Cooking Skills/Barriers:82517}  {Grocery Shoppin}  {Supplements:07325} {FREQUENCY:64771}  {Sleep duration/quality (Optional):54825}  {Sleep disorders:41235}  Food Insecurity:     Dietary Recall:   Meal 1:   Meal 2:   Meal 3:     Snacks:  Beverages:  Eating out:   Alcohol Intake:  Self-Identified Challenges:    Physical Activity    Labs:  Lab Results   Component Value Date    HGBA1C 5.7 2024    HGBA1C 6.2 2023    HGBA1C 5.9 (A) 2023     2023    K 4.4 2023     (H) 2023    CO2 26 2023    BUN 16 2023    CREATININE 0.57 2023    CALCIUM 9.6 2023    ALBUMIN 4.4 2023    PROT 7.4 2023    BILITOT 0.4 2023    ALKPHOS 125 (H) 2023    ALT 27 2023    AST 23 2023    GLUCOSE 95 2023    CHOL 134 2023    TRIG 61 2023    HDL 52.5 2023   Comments: A1c = 5.7%, at goal (3/29/24). Lipid panel is at goal (23). H/o vitamin D deficiency.     Diabetes:  Diagnosed:  [facility]  Prior Nutrition Education:  SMBG:  Hypoglycemia:     Current DM Medications/Insulin Regimen:  - Ozempic 0.5 mg once weekly      Nutrition Focused Physical Exam:    {Performed/Deferred:89354}    Muscle Wasting:                   Loss of Subcutaneous Fat:                Other Physical Findings:                   Past Medical History:  Patient Active Problem List   Diagnosis    Adverse reaction to drug, initial encounter     "Right shoulder pain    Allergic rhinitis    Non-allergic rhinitis    Anxiety    Asthmatic bronchitis with exacerbation (HHS-HCC)    Benign essential hypertension    Blurry vision    Elevated serum gamma-glutamyl transferase level    Essential familial hyperlipidemia    GERD without esophagitis    Pain of left hand    Persistent cough    Right knee pain    Sciatica of right side    Shortness of breath    Shoulder impingement, right    Strain of right rotator cuff capsule    Tendinitis of long head of biceps brachii of right shoulder    Tendinitis of right rotator cuff    Toxic effect of venom    Transaminitis    Menopausal symptoms    Severe persistent asthma (Multi)    Median nerve neuritis    Extensor tendinitis of hand    Diabetes mellitus (Multi)    Abdominal aortic aneurysm (CMS-HCC)    Degeneration of intervertebral disc of cervical region    Hyperlipidemia    Insomnia    Migraine    Ocular hypertension    Other intervertebral disc displacement, lumbar region    Persistent asthma with acute exacerbation (HHS-HCC)    Type 2 diabetes mellitus without complications (Multi)    Vertigo    Wheezing    Uncontrolled type 2 diabetes mellitus with hyperglycemia, without long-term current use of insulin (Multi)    Cervicalgia    Chronic constipation    History of hypercholesterolemia    Epistaxis    Nasal obstruction    Plantar fasciitis        Anthropometrics:  Ht Readings from Last 1 Encounters:   05/14/24 1.549 m (5' 1\")     BMI Readings from Last 1 Encounters:   05/14/24 29.10 kg/m²     Wt Readings from Last 10 Encounters:   05/14/24 69.9 kg (154 lb)   05/07/24 68.9 kg (152 lb)   04/24/24 68.9 kg (152 lb)   03/29/24 68.9 kg (152 lb)   03/26/24 69.7 kg (153 lb 9.6 oz)   02/28/24 69.4 kg (153 lb)   01/23/24 68 kg (149 lb 14.6 oz)   01/09/24 68 kg (149 lb 14.6 oz)   11/21/23 70.8 kg (156 lb)   10/05/23 66.6 kg (146 lb 12.8 oz)       Weight change:   Significant weight change: {YES/NA/NO:73204}    Estimated Nutrition " Needs:                     Nutrition Diagnosis                                                             Nutrition Interventions/Recommendations   FOOD & NUTRIENT DELIVERY: {NUTRIENTDELIVERY:19235}    NUTRITION COUNSELING: {nutrition counselin}    COORDINATION OF CARE: {coordination of care:22900}    NUTRITION EDUCATION:     {Educational Handouts:93438:x::1}    *Patient expressed understanding of the education provided and denied any additional questions/concerns.       Nutrition Monitoring and Evaluation   {Nutrition Goals (Optional):32905}    {Readiness to Change (Optional):37716}  {Level of Understanding (Optional):48492}  {Anticipated Compliant (Optional):89189}     Follow-up: {RTC:71991}

## 2024-06-03 ENCOUNTER — PHARMACY VISIT (OUTPATIENT)
Dept: PHARMACY | Facility: CLINIC | Age: 54
End: 2024-06-03
Payer: COMMERCIAL

## 2024-06-03 ENCOUNTER — APPOINTMENT (OUTPATIENT)
Dept: GASTROENTEROLOGY | Facility: HOSPITAL | Age: 54
End: 2024-06-03
Payer: COMMERCIAL

## 2024-06-04 ENCOUNTER — DOCUMENTATION (OUTPATIENT)
Dept: OBSTETRICS AND GYNECOLOGY | Facility: CLINIC | Age: 54
End: 2024-06-04

## 2024-06-04 ENCOUNTER — APPOINTMENT (OUTPATIENT)
Dept: PULMONOLOGY | Facility: CLINIC | Age: 54
End: 2024-06-04
Payer: COMMERCIAL

## 2024-06-04 ENCOUNTER — APPOINTMENT (OUTPATIENT)
Dept: ALLERGY | Facility: CLINIC | Age: 54
End: 2024-06-04
Payer: COMMERCIAL

## 2024-06-04 NOTE — PROGRESS NOTES
PA for PARoxetine Mesylate 7.5MG capsules  Key: [id number]  EXCEPTION TO COVERAGE REQUEST - NOT COVERED  Your information has been sent to [employer]  Documentation submitted will be uploaded to Merlin Benz RN

## 2024-06-05 ENCOUNTER — OFFICE VISIT (OUTPATIENT)
Dept: PULMONOLOGY | Facility: CLINIC | Age: 54
End: 2024-06-05
Payer: COMMERCIAL

## 2024-06-05 ENCOUNTER — LAB (OUTPATIENT)
Dept: LAB | Facility: LAB | Age: 54
End: 2024-06-05
Payer: COMMERCIAL

## 2024-06-05 ENCOUNTER — OFFICE VISIT (OUTPATIENT)
Dept: OTOLARYNGOLOGY | Facility: CLINIC | Age: 54
End: 2024-06-05
Payer: COMMERCIAL

## 2024-06-05 VITALS
WEIGHT: 153 LBS | BODY MASS INDEX: 28.91 KG/M2 | RESPIRATION RATE: 22 BRPM | OXYGEN SATURATION: 93 % | TEMPERATURE: 97.4 F

## 2024-06-05 VITALS — HEIGHT: 61 IN | BODY MASS INDEX: 28.89 KG/M2 | WEIGHT: 153 LBS

## 2024-06-05 DIAGNOSIS — J31.0 CHRONIC RHINITIS: Primary | ICD-10-CM

## 2024-06-05 DIAGNOSIS — J45.41 MODERATE PERSISTENT ASTHMATIC BRONCHITIS WITH ACUTE EXACERBATION (HHS-HCC): ICD-10-CM

## 2024-06-05 DIAGNOSIS — J34.89 NASAL OBSTRUCTION: ICD-10-CM

## 2024-06-05 DIAGNOSIS — J45.50 SEVERE PERSISTENT ASTHMA, UNSPECIFIED WHETHER COMPLICATED (MULTI): Primary | ICD-10-CM

## 2024-06-05 DIAGNOSIS — R04.0 EPISTAXIS: ICD-10-CM

## 2024-06-05 LAB
ANION GAP SERPL CALC-SCNC: 12 MMOL/L (ref 10–20)
BASOPHILS # BLD AUTO: 0.05 X10*3/UL (ref 0–0.1)
BASOPHILS NFR BLD AUTO: 1.1 %
BUN SERPL-MCNC: 13 MG/DL (ref 6–23)
CALCIUM SERPL-MCNC: 9.3 MG/DL (ref 8.6–10.3)
CHLORIDE SERPL-SCNC: 102 MMOL/L (ref 98–107)
CO2 SERPL-SCNC: 28 MMOL/L (ref 21–32)
CREAT SERPL-MCNC: 0.54 MG/DL (ref 0.5–1.05)
EGFRCR SERPLBLD CKD-EPI 2021: >90 ML/MIN/1.73M*2
EOSINOPHIL # BLD AUTO: 0.3 X10*3/UL (ref 0–0.7)
EOSINOPHIL NFR BLD AUTO: 6.5 %
ERYTHROCYTE [DISTWIDTH] IN BLOOD BY AUTOMATED COUNT: 13 % (ref 11.5–14.5)
GLUCOSE SERPL-MCNC: 93 MG/DL (ref 74–99)
HCT VFR BLD AUTO: 41.7 % (ref 36–46)
HGB BLD-MCNC: 14.2 G/DL (ref 12–16)
IMM GRANULOCYTES # BLD AUTO: 0.02 X10*3/UL (ref 0–0.7)
IMM GRANULOCYTES NFR BLD AUTO: 0.4 % (ref 0–0.9)
LYMPHOCYTES # BLD AUTO: 1.51 X10*3/UL (ref 1.2–4.8)
LYMPHOCYTES NFR BLD AUTO: 32.7 %
MCH RBC QN AUTO: 29.6 PG (ref 26–34)
MCHC RBC AUTO-ENTMCNC: 34.1 G/DL (ref 32–36)
MCV RBC AUTO: 87 FL (ref 80–100)
MONOCYTES # BLD AUTO: 0.6 X10*3/UL (ref 0.1–1)
MONOCYTES NFR BLD AUTO: 13 %
NEUTROPHILS # BLD AUTO: 2.14 X10*3/UL (ref 1.2–7.7)
NEUTROPHILS NFR BLD AUTO: 46.3 %
NRBC BLD-RTO: 0 /100 WBCS (ref 0–0)
PLATELET # BLD AUTO: 235 X10*3/UL (ref 150–450)
POTASSIUM SERPL-SCNC: 3.7 MMOL/L (ref 3.5–5.3)
RBC # BLD AUTO: 4.8 X10*6/UL (ref 4–5.2)
SODIUM SERPL-SCNC: 138 MMOL/L (ref 136–145)
WBC # BLD AUTO: 4.6 X10*3/UL (ref 4.4–11.3)

## 2024-06-05 PROCEDURE — 99215 OFFICE O/P EST HI 40 MIN: CPT | Performed by: INTERNAL MEDICINE

## 2024-06-05 PROCEDURE — 80048 BASIC METABOLIC PNL TOTAL CA: CPT

## 2024-06-05 PROCEDURE — 3008F BODY MASS INDEX DOCD: CPT | Performed by: OTOLARYNGOLOGY

## 2024-06-05 PROCEDURE — RXMED WILLOW AMBULATORY MEDICATION CHARGE

## 2024-06-05 PROCEDURE — 85025 COMPLETE CBC W/AUTO DIFF WBC: CPT

## 2024-06-05 PROCEDURE — 36415 COLL VENOUS BLD VENIPUNCTURE: CPT

## 2024-06-05 PROCEDURE — 86036 ANCA SCREEN EACH ANTIBODY: CPT

## 2024-06-05 PROCEDURE — 99213 OFFICE O/P EST LOW 20 MIN: CPT | Performed by: OTOLARYNGOLOGY

## 2024-06-05 PROCEDURE — 1036F TOBACCO NON-USER: CPT | Performed by: OTOLARYNGOLOGY

## 2024-06-05 PROCEDURE — 83516 IMMUNOASSAY NONANTIBODY: CPT

## 2024-06-05 PROCEDURE — 1036F TOBACCO NON-USER: CPT | Performed by: INTERNAL MEDICINE

## 2024-06-05 PROCEDURE — 3008F BODY MASS INDEX DOCD: CPT | Performed by: INTERNAL MEDICINE

## 2024-06-05 RX ORDER — PREDNISONE 10 MG/1
TABLET ORAL
Qty: 30 TABLET | Refills: 0 | Status: SHIPPED | OUTPATIENT
Start: 2024-06-05 | End: 2024-07-05

## 2024-06-05 ASSESSMENT — PATIENT HEALTH QUESTIONNAIRE - PHQ9
1. LITTLE INTEREST OR PLEASURE IN DOING THINGS: NOT AT ALL
2. FEELING DOWN, DEPRESSED OR HOPELESS: NOT AT ALL
SUM OF ALL RESPONSES TO PHQ9 QUESTIONS 1 AND 2: 0

## 2024-06-05 NOTE — PROGRESS NOTES
"Chief Complaint:  Follow up nasal congestion and rhinitis.    Referring Provider: No ref. provider found    History of Present Illness:    Edmond Cool is a 53 y.o. female, presenting for follow-up.  She has been having issues with her asthma and has been having use her rescue inhaler often.  She has an appointment with pulmonology today.  She overall says that things are feeling better with respect to her nose.  She had been using mupirocin which really improved the bleeding and irritation of her anterior septum.  She still feels intermittently congested due to allergies but feels somewhat better.  She is taking Xyzal and Astelin.  She still not performing her nasal irrigations.  She was scheduled to see allergy however her appointment was rescheduled.  No other new issues.    ?  Review of Systems:    Review of symptoms was negative except for those stated including Cardiopulmonary, Genitourinary, Gastrointestinal, Psychological, Sleep pattern, Endocrine, Eyes, Neurologic, Musculoskeletal, Skin, Hematologic/Lymphatic and Allergic/Immunologic.     Medical History:    I have reviewed the patient's updated past medical history, surgical history, family history, social history, as well as current medications and allergies as of 6/5/2024. Changes to these items have been updated and marked as reviewed in the electronic medical record.    Physical Exam:    Vitals:  height is 1.549 m (5' 1\") and weight is 69.4 kg (153 lb).   General: Patient doing well overall and is in no apparent distress.  Psych: Pleasant affect, and answers questions appropriately.  Head & Face: Symmetric facial movements  Eyes: Pupils equal, round, reactive.  Extraocular movements intact without gaze restrictions or nystagmus. No epiphora.  Ears:  External auditory canals are normal.  Tympanic membranes are clear.  No middle ear effusion is seen.  All middle ear landmarks are normal.  Nose: Anterior rhinoscopy revealed normal sinonasal " mucosa. More posterior areas of the nasal cavity could not be completely examined.  Oral Cavity/Oropharynx:  Without lesions or masses to visual exam.  Neck: Supple without lymphadenopathy.  Lungs: Non-labored, and without evidence of stridor.  Cardiac: Pulses are strong, well-perfused.  Extremities: Without gross evidence of clubbing, cyanosis, or edema.  Neuro: Cranial nerves II-XII grossly intact; Intact facial movements.    Assessment:     Edmond Cool is a 53 y.o. female with chronic rhinitis, symptoms much more significant for her pulmonary disease currently.    Plan:      She seems to overall be doing better with respect to her rhinitis and vestibulitis.  I still recommended to her to continue saline irrigations and asked her to check in with pulmonology and allergy and then [place] back if she is continuing to have nasal issues.      Jacinto Pacheco MD, M.Eng.   of Otolaryngology - Head & Neck Surgery  Division of Rhinology and Endoscopic Skull Base Surgery  [facility]

## 2024-06-05 NOTE — PATIENT INSTRUCTIONS
Dear Edmond Cool It was nice seeing you today. We discussed the following:   Gave you a prescription for prednisone to start using if symptoms not better/worse   The dupixent is not working and we will start anew biologic called xolair   I ordered  breathing test a chest and sinus CT and some blood work   RTC in few weeks   Update me by phone         For scheduling purposes:    Call [phone] to schedule a breathing or a walking test     Call [phone] to schedule  EKG's, Echocardiograms and Cardiopulmonary Stress Tests.    Call [phone] to schedule Radiology tests such as Nuclear Medicine Stress Tests, CT Scans, and MRI's.    Should you have any questions Please Call my assistant Sally Clayton at [phone] or our pulmonary nurse Dariela Wen [phone].

## 2024-06-05 NOTE — PROGRESS NOTES
"Asthma severe persistent     Interval 6/5/2024  ACT 14. Montelukast, Advair  500/50   Using her albuterol frequently   Dupixent since jan 2024 not much improvement   Last pred burst in Jan 2024. No ER  visit or hospitalization     PE:  Temp 36.3 °C (97.4 °F)   Resp 22   Wt 69.4 kg (153 lb)   LMP  (LMP Unknown)   SpO2 93% Comment: Room air  BMI 28.91 kg/m²   Lungs: CTA     Interval 1/09/2024  Patient states she has \"gone downhill\" since last visit. Had \"double pneumonia\" and \"double asthma attack.\" Feels as though lungs have just been damaged as a result. Was also working in a building with mold, had to switch jobs. Reports \"asthma attack\" just before [place] with shortness of breath and wheezing. Also having increasing shortness of breath with exertion, that causes light-headedness. Walked with pulse ox yesterday and dropped to 89% SaO2.     Is using albuterol rescue inhaler every 4 hours. Also using Advair and Montelukast. She did put herself on steroids last week with rescue pack and this helped with shortness of breath but then symptoms worsened as soon as she stopped steroids.         Diag testing   PFT last 2021 pos BDR in the small airways no obstruction.     IgE 500 2022, 347 2023, neg aspergillus iGe and Igg  Eosinos 300   Eosinophils Absolute (x10E9/L)   Date Value   12/09/2022 0.30   03/11/2022 0.20   12/29/2020 0.17     IgE (IU/mL)   Date Value   03/16/2023 347 (H)       === 05/30/23 ===    CT CHEST WO IV CONTRAST    - Impression -  1. No significant interval change in the lungs compared to CT  03/13/2023, as described above. Findings may represent sequela of  prior infectious/inflammatory process such as reported recent COVID  pneumonia requiring hospitalization. Small airways disease and  atypical infectious processes remain differential considerations. A  component of organizing pneumonia is again not excluded.  2. Stable borderline and mildly enlarged mediastinal and upper  abdominal lymph " nodes, likely reactive.  3. Small sliding hiatal hernia.  4. Stable 3 mm nodule in the right upper lobe.    I personally reviewed the images/study and I agree with the findings as stated by resident physician Dr. Daniel Rivas.      Impression and Plan    53-year old AA female (ex smoker, quit 22 years, <5 pack years), h/o asthma (since age 25), htn, hypercholesteremia, here for evaluation of asthma symptoms that have been uncontrolled of late.  Patient experience shortness of breath and cough constantly. She had covid about few months ago, had to be hospitalized for 1 day.  Has been on steroids on and off for about once a month. She has relief from the steroids.    1) severe persistent asthma -   Continue albuterol nebs twice daily And up to 4 times as needed.  Continue Advair 500/50 1 puff twice daily. Rinse and gargle after each use ( she gets it from [facility])  Ige levels >500 -->323, eosi 150-300, no fenO      Continue monteleukast  Start Xolair  Dupilumab Jan-June 2024 no relief   Patient to follow up with her allergist       PFT, ANCA , CT     2) Bilateral interstitial infiltrates - CT scan of the chest 05 2023 showed bilateral mild bronchial wall thickening and mucus plugging, minimal air trapping 3 mm RUL lung nodule. Patchy lobular bandlike areas of opacity in bilateral lungs.  Repeat CT shows persistent patchy infiltrates, likely residual from prior COVID.  (Aspergillus IgE and igG negative)  Need repeat CT now       3) Allergic rhinitis and post nasal drip  - Continue Flonase 1 sq in each nostril twice daily  - Continue to follow up with allergy.        RTC in 6 months

## 2024-06-06 ENCOUNTER — PHARMACY VISIT (OUTPATIENT)
Dept: PHARMACY | Facility: CLINIC | Age: 54
End: 2024-06-06
Payer: COMMERCIAL

## 2024-06-06 NOTE — PROGRESS NOTES
RN called [facility] for status on PA Case ID #: [id number]  PA is pending Pharmacy Review  Ginna Benz RN

## 2024-06-07 NOTE — PROGRESS NOTES
PA is denied     1) All covered drugs used for your health issue have not been tried and failed. Other drugs that can be used are other strengths of paroxetine (10mg, 20mg, 30mg, 40mg), citalopram, escitalopram, venlafaxine extended release (ER), and estrogen-containing products such as estradiol patch, and Veozah. Please look at the formulary to see what drugs are covered. Prior authorization may be required and quantity limits may apply to covered drugs.    RN sent message to provider.    Ginna Benz RN

## 2024-06-08 LAB
ANCA AB PATTERN SER IF-IMP: NORMAL
ANCA IGG TITR SER IF: NORMAL {TITER}
MYELOPEROXIDASE AB SER-ACNC: 0 AU/ML (ref 0–19)
PROTEINASE3 AB SER-ACNC: 0 AU/ML (ref 0–19)

## 2024-06-11 ENCOUNTER — PATIENT OUTREACH (OUTPATIENT)
Dept: CARE COORDINATION | Facility: CLINIC | Age: 54
End: 2024-06-11
Payer: COMMERCIAL

## 2024-06-12 ENCOUNTER — HOSPITAL ENCOUNTER (OUTPATIENT)
Dept: RESPIRATORY THERAPY | Facility: CLINIC | Age: 54
Discharge: HOME | End: 2024-06-12
Payer: COMMERCIAL

## 2024-06-12 ENCOUNTER — APPOINTMENT (OUTPATIENT)
Dept: RADIOLOGY | Facility: HOSPITAL | Age: 54
End: 2024-06-12
Payer: COMMERCIAL

## 2024-06-12 ENCOUNTER — HOSPITAL ENCOUNTER (OUTPATIENT)
Dept: RADIOLOGY | Facility: CLINIC | Age: 54
Discharge: HOME | End: 2024-06-12
Payer: COMMERCIAL

## 2024-06-12 DIAGNOSIS — J45.41 MODERATE PERSISTENT ASTHMATIC BRONCHITIS WITH ACUTE EXACERBATION (HHS-HCC): ICD-10-CM

## 2024-06-12 PROCEDURE — 95012 NITRIC OXIDE EXP GAS DETER: CPT

## 2024-06-12 PROCEDURE — 70486 CT MAXILLOFACIAL W/O DYE: CPT

## 2024-06-12 PROCEDURE — 94729 DIFFUSING CAPACITY: CPT

## 2024-06-12 PROCEDURE — 71250 CT THORAX DX C-: CPT

## 2024-06-12 PROCEDURE — 94060 EVALUATION OF WHEEZING: CPT

## 2024-06-19 ENCOUNTER — APPOINTMENT (OUTPATIENT)
Dept: PULMONOLOGY | Facility: CLINIC | Age: 54
End: 2024-06-19
Payer: COMMERCIAL

## 2024-06-20 ENCOUNTER — OFFICE VISIT (OUTPATIENT)
Dept: PULMONOLOGY | Facility: HOSPITAL | Age: 54
End: 2024-06-20
Payer: COMMERCIAL

## 2024-06-20 ENCOUNTER — HOSPITAL ENCOUNTER (OUTPATIENT)
Dept: RESPIRATORY THERAPY | Facility: HOSPITAL | Age: 54
Discharge: HOME | End: 2024-06-20
Payer: COMMERCIAL

## 2024-06-20 ENCOUNTER — APPOINTMENT (OUTPATIENT)
Dept: RESPIRATORY THERAPY | Facility: HOSPITAL | Age: 54
End: 2024-06-20
Payer: COMMERCIAL

## 2024-06-20 VITALS
OXYGEN SATURATION: 95 % | WEIGHT: 163 LBS | BODY MASS INDEX: 30.8 KG/M2 | DIASTOLIC BLOOD PRESSURE: 82 MMHG | TEMPERATURE: 98.3 F | HEART RATE: 74 BPM | SYSTOLIC BLOOD PRESSURE: 123 MMHG

## 2024-06-20 DIAGNOSIS — J45.41 MODERATE PERSISTENT ASTHMATIC BRONCHITIS WITH ACUTE EXACERBATION (HHS-HCC): ICD-10-CM

## 2024-06-20 DIAGNOSIS — J45.41 MODERATE PERSISTENT ASTHMATIC BRONCHITIS WITH ACUTE EXACERBATION (HHS-HCC): Primary | ICD-10-CM

## 2024-06-20 DIAGNOSIS — R05.3 PERSISTENT COUGH: ICD-10-CM

## 2024-06-20 PROCEDURE — 95012 NITRIC OXIDE EXP GAS DETER: CPT

## 2024-06-20 PROCEDURE — 99215 OFFICE O/P EST HI 40 MIN: CPT | Performed by: INTERNAL MEDICINE

## 2024-06-20 PROCEDURE — 94060 EVALUATION OF WHEEZING: CPT | Performed by: INTERNAL MEDICINE

## 2024-06-20 PROCEDURE — 3074F SYST BP LT 130 MM HG: CPT | Performed by: INTERNAL MEDICINE

## 2024-06-20 PROCEDURE — 3079F DIAST BP 80-89 MM HG: CPT | Performed by: INTERNAL MEDICINE

## 2024-06-20 PROCEDURE — 3008F BODY MASS INDEX DOCD: CPT | Performed by: INTERNAL MEDICINE

## 2024-06-20 RX ORDER — TIOTROPIUM BROMIDE INHALATION SPRAY 3.12 UG/1
2 SPRAY, METERED RESPIRATORY (INHALATION) DAILY
Qty: 4 G | Refills: 3 | Status: SHIPPED | OUTPATIENT
Start: 2024-06-20

## 2024-06-20 RX ORDER — FLUTICASONE PROPIONATE AND SALMETEROL 500; 50 UG/1; UG/1
1 POWDER RESPIRATORY (INHALATION)
Qty: 60 EACH | Refills: 3 | Status: SHIPPED | OUTPATIENT
Start: 2024-06-20

## 2024-06-20 RX ORDER — BENRALIZUMAB 30 MG/ML
30 INJECTION, SOLUTION SUBCUTANEOUS
Qty: 1 ML | Refills: 1 | Status: SHIPPED | OUTPATIENT
Start: 2024-06-20 | End: 2024-08-16

## 2024-06-20 SDOH — ECONOMIC STABILITY: FOOD INSECURITY: WITHIN THE PAST 12 MONTHS, THE FOOD YOU BOUGHT JUST DIDN'T LAST AND YOU DIDN'T HAVE MONEY TO GET MORE.: NEVER TRUE

## 2024-06-20 SDOH — ECONOMIC STABILITY: FOOD INSECURITY: WITHIN THE PAST 12 MONTHS, YOU WORRIED THAT YOUR FOOD WOULD RUN OUT BEFORE YOU GOT MONEY TO BUY MORE.: NEVER TRUE

## 2024-06-20 ASSESSMENT — PATIENT HEALTH QUESTIONNAIRE - PHQ9
2. FEELING DOWN, DEPRESSED OR HOPELESS: NOT AT ALL
1. LITTLE INTEREST OR PLEASURE IN DOING THINGS: NOT AT ALL
SUM OF ALL RESPONSES TO PHQ9 QUESTIONS 1 & 2: 0

## 2024-06-20 ASSESSMENT — PAIN SCALES - GENERAL: PAINLEVEL: 0-NO PAIN

## 2024-06-20 ASSESSMENT — LIFESTYLE VARIABLES
AUDIT-C TOTAL SCORE: 0
SKIP TO QUESTIONS 9-10: 1
HOW OFTEN DO YOU HAVE SIX OR MORE DRINKS ON ONE OCCASION: NEVER
HOW OFTEN DO YOU HAVE A DRINK CONTAINING ALCOHOL: NEVER
HOW MANY STANDARD DRINKS CONTAINING ALCOHOL DO YOU HAVE ON A TYPICAL DAY: PATIENT DOES NOT DRINK

## 2024-06-20 NOTE — PROGRESS NOTES
"Asthma severe persistent   Interval     Finised 2 weeks of prednisone.  It helped but not like it used to.  She was still using her nebulizer about 3 times per day and before bedtime.  No nocturnal awakening.  She feels the hot humidity are bothering her.      PE:  /82 (BP Location: Left arm, Patient Position: Sitting)   Pulse 74   Temp 36.8 °C (98.3 °F) (Temporal)   Wt 73.9 kg (163 lb)   SpO2 95% Comment: RA  BMI 30.80 kg/m²   Lungs:CTA     Interval 6/5/2024  ACT 14. Montelukast, Advair  500/50   Using her albuterol frequently   Dupixent since jan 2024 not much improvement   Last pred burst in Jan 2024. No ER  visit or hospitalization     PE:  Temp 36.3 °C (97.4 °F)   Resp 22   Wt 69.4 kg (153 lb)   LMP  (LMP Unknown)   SpO2 93% Comment: Room air  BMI 28.91 kg/m²   Lungs: CTA     Interval 1/09/2024  Patient states she has \"gone downhill\" since last visit. Had \"double pneumonia\" and \"double asthma attack.\" Feels as though lungs have just been damaged as a result. Was also working in a building with mold, had to switch jobs. Reports \"asthma attack\" just before [place] with shortness of breath and wheezing. Also having increasing shortness of breath with exertion, that causes light-headedness. Walked with pulse ox yesterday and dropped to 89% SaO2.     Is using albuterol rescue inhaler every 4 hours. Also using Advair and Montelukast. She did put herself on steroids last week with rescue pack and this helped with shortness of breath but then symptoms worsened as soon as she stopped steroids.     Diag testing   PFT last 2021 ratio normal.  FEV1 1.77.  FVC 2.05, DLCO 16 pos BDR in the small airways no obstruction.     PFT 06 2024 ratio normal.  FEV1 1.91, FVC 2.2, DLCO 15.  No response to bronchodilator.  Feno 9      IgE 500 2022, 347 2023, neg aspergillus iGe and Igg  Eosinos 300   Eosinophils Absolute (x10E9/L)   Date Value   12/09/2022 0.30   03/11/2022 0.20   12/29/2020 0.17     IgE (IU/mL)   Date " Value   03/16/2023 347 (H)     Aspergillus ige negative   === 05/30/23 ===    CT CHEST WO IV CONTRAST    - Impression -  1. No significant interval change in the lungs compared to CT  03/13/2023, as described above. Findings may represent sequela of  prior infectious/inflammatory process such as reported recent COVID  pneumonia requiring hospitalization. Small airways disease and  atypical infectious processes remain differential considerations. A  component of organizing pneumonia is again not excluded.  2. Stable borderline and mildly enlarged mediastinal and upper  abdominal lymph nodes, likely reactive.  3. Small sliding hiatal hernia.  4. Stable 3 mm nodule in the right upper lobe.    I personally reviewed the images/study and I agree with the findings as stated by resident physician Dr. Daniel Rivas.    Chest CT 06 2024   1.Redemonstrated bilateral bandlike opacities and reticulations with  associated subtle ground-glass opacities bilaterally. Interval slight  worsening patchy ground-glass opacities within the right upper lobe.  Findings again may represent sequelae of COVID with possible  organizing pneumonia. Superimposed atypical infectious/inflammatory  process not excluded.  2. Similar appearance of mildly enlarged mediastinal lymph nodes,  likely reactive in nature.  3. Additional stable chronic findings as described above.    Sinus CT  CT 06/2024  wnl    Impression and Plan    53-year old AA female (ex smoker, quit 22 years, <5 pack years), h/o asthma (since age 25), htn, hypercholesteremia, here for evaluation of asthma symptoms that have been uncontrolled of late.  Patient experience shortness of breath and cough constantly. She had covid about 2 years ago had to be hospitalized for 1 day.  Has been on steroids on and off for about once a month. She has partial relief from the steroids     1) severe persistent asthma - Ige levels >500 -->323, eosi 150-300, FeNO O  9 and 5 but on prednisone. ANCA  negative    Continue albuterol nebs twice daily and up to 4 times as needed.  Continue Advair 500/50 1 puff twice daily. Rinse and gargle after each use ( she gets it from [facility])  Add LAMA Continue montelukast  Dupilumab Jan-June 2024 no relief   We will try Fasenra  Patient to follow up with her allergist           2) Bilateral interstitial infiltrates - CT scan of the chest 05 2023 showed bilateral mild bronchial wall thickening and mucus plugging, minimal air trapping 3 mm RUL lung nodule. Patchy lobular bandlike areas of opacity in bilateral lungs.  Repeat CT shows persistent patchy infiltrates, likely residual from prior COVID.  (Aspergillus IgE and igG negative)      3) Allergic rhinitis and post nasal drip  - Continue Flonase 1 sq in each nostril twice daily  - Continue to follow up with allergy.        RTC in 2 to 3 months

## 2024-06-20 NOTE — PATIENT INSTRUCTIONS
Dear Edmond Cool It was nice seeing you today. We discussed the following:   We will start a new inhaler called Spiriva to take daily   Also will start a new injection medication called fasenra   RTC in 2 to 3 months     For scheduling purposes:    Call [phone] to schedule a breathing or a walking test     Call [phone] to schedule  EKG's, Echocardiograms and Cardiopulmonary Stress Tests.    Call [phone] to schedule Radiology tests such as Nuclear Medicine Stress Tests, CT Scans, and MRI's.    Should you have any questions Please Call my assistant Sally Clayton at [phone] or our pulmonary nurse Dariela Wen [phone].

## 2024-06-21 LAB
MGC ASCENT PFT - FEV1 - POST: 1.86
MGC ASCENT PFT - FEV1 - POST: 1.86
MGC ASCENT PFT - FEV1 - PRE: 1.78
MGC ASCENT PFT - FEV1 - PRE: 1.78
MGC ASCENT PFT - FEV1 - PREDICTED: 2.29
MGC ASCENT PFT - FEV1 - PREDICTED: 2.29
MGC ASCENT PFT - FVC - POST: 2.15
MGC ASCENT PFT - FVC - POST: 2.15
MGC ASCENT PFT - FVC - PRE: 2.06
MGC ASCENT PFT - FVC - PRE: 2.06
MGC ASCENT PFT - FVC - PREDICTED: 2.8
MGC ASCENT PFT - FVC - PREDICTED: 2.8

## 2024-06-22 RX ORDER — UMECLIDINIUM 62.5 UG/1
1 AEROSOL, POWDER ORAL DAILY
Qty: 30 EACH | Refills: 3 | Status: SHIPPED | OUTPATIENT
Start: 2024-06-22

## 2024-06-24 DIAGNOSIS — K21.9 GASTROESOPHAGEAL REFLUX DISEASE WITHOUT ESOPHAGITIS: ICD-10-CM

## 2024-06-24 PROCEDURE — RXMED WILLOW AMBULATORY MEDICATION CHARGE

## 2024-06-24 RX ORDER — PANTOPRAZOLE SODIUM 40 MG/1
40 TABLET, DELAYED RELEASE ORAL DAILY
Qty: 90 TABLET | Refills: 0 | Status: SHIPPED | OUTPATIENT
Start: 2024-06-24 | End: 2025-06-24

## 2024-06-25 ENCOUNTER — PHARMACY VISIT (OUTPATIENT)
Dept: PHARMACY | Facility: CLINIC | Age: 54
End: 2024-06-25
Payer: COMMERCIAL

## 2024-06-26 ENCOUNTER — LAB (OUTPATIENT)
Dept: LAB | Facility: LAB | Age: 54
End: 2024-06-26
Payer: COMMERCIAL

## 2024-06-26 ENCOUNTER — SPECIALTY PHARMACY (OUTPATIENT)
Dept: PHARMACY | Facility: CLINIC | Age: 54
End: 2024-06-26

## 2024-06-26 DIAGNOSIS — Z11.1 PPD SCREENING TEST: ICD-10-CM

## 2024-06-26 DIAGNOSIS — J45.41 MODERATE PERSISTENT ASTHMATIC BRONCHITIS WITH ACUTE EXACERBATION (HHS-HCC): ICD-10-CM

## 2024-06-26 PROCEDURE — 86038 ANTINUCLEAR ANTIBODIES: CPT

## 2024-06-26 PROCEDURE — 86481 TB AG RESPONSE T-CELL SUSP: CPT

## 2024-06-26 PROCEDURE — 36415 COLL VENOUS BLD VENIPUNCTURE: CPT

## 2024-06-26 PROCEDURE — 86225 DNA ANTIBODY NATIVE: CPT

## 2024-06-26 PROCEDURE — 86235 NUCLEAR ANTIGEN ANTIBODY: CPT

## 2024-06-27 LAB
CENTROMERE B AB SER-ACNC: <0.2 AI
CHROMATIN AB SERPL-ACNC: <0.2 AI
DSDNA AB SER-ACNC: <1 IU/ML
ENA JO1 AB SER QL IA: <0.2 AI
ENA RNP AB SER IA-ACNC: <0.2 AI
ENA SCL70 AB SER QL IA: <0.2 AI
ENA SM AB SER IA-ACNC: <0.2 AI
ENA SM+RNP AB SER QL IA: <0.2 AI
ENA SS-A AB SER IA-ACNC: <0.2 AI
ENA SS-B AB SER IA-ACNC: <0.2 AI
RIBOSOMAL P AB SER-ACNC: <0.2 AI

## 2024-06-28 LAB
ANA SER QL HEP2 SUBST: NEGATIVE
NIL(NEG) CONTROL SPOT COUNT: NORMAL
PANEL A SPOT COUNT: 1
PANEL B SPOT COUNT: 2
POS CONTROL SPOT COUNT: NORMAL
T-SPOT. TB INTERPRETATION: NEGATIVE

## 2024-07-01 ENCOUNTER — PHARMACY VISIT (OUTPATIENT)
Dept: PHARMACY | Facility: CLINIC | Age: 54
End: 2024-07-01
Payer: COMMERCIAL

## 2024-07-08 ENCOUNTER — SPECIALTY PHARMACY (OUTPATIENT)
Dept: PHARMACY | Facility: CLINIC | Age: 54
End: 2024-07-08

## 2024-07-12 ENCOUNTER — APPOINTMENT (OUTPATIENT)
Dept: PHARMACY | Facility: HOSPITAL | Age: 54
End: 2024-07-12
Payer: COMMERCIAL

## 2024-07-12 ENCOUNTER — OFFICE VISIT (OUTPATIENT)
Dept: ORTHOPEDIC SURGERY | Facility: HOSPITAL | Age: 54
End: 2024-07-12
Payer: COMMERCIAL

## 2024-07-12 DIAGNOSIS — M72.2 PLANTAR FASCIITIS OF RIGHT FOOT: ICD-10-CM

## 2024-07-12 DIAGNOSIS — M77.00 MEDIAL EPICONDYLITIS OF ELBOW, UNSPECIFIED LATERALITY: Primary | ICD-10-CM

## 2024-07-12 PROCEDURE — A4467 BELT STRAP SLEEV GRMNT COVER: HCPCS | Performed by: PHYSICIAN ASSISTANT

## 2024-07-12 PROCEDURE — 99214 OFFICE O/P EST MOD 30 MIN: CPT | Performed by: PHYSICIAN ASSISTANT

## 2024-07-12 PROCEDURE — 3008F BODY MASS INDEX DOCD: CPT | Performed by: PHYSICIAN ASSISTANT

## 2024-07-12 PROCEDURE — L4361 PNEUMA/VAC WALK BOOT PRE OTS: HCPCS | Performed by: PHYSICIAN ASSISTANT

## 2024-07-12 NOTE — PROGRESS NOTES
NPV-   History of Present Illness  53 y.o.female here for right foot and b/l elbow pain  1. Medial epicondylitis of elbow, unspecified laterality  Referral to Physical Therapy    Walking Boot Short      2. Plantar fasciitis of right foot  Referral to Physical Therapy    Tennis Elbow Strap        Mechanism of injury: none, walking  Date of Injury/Pain: ~ 2 weeks  Location of pain: plantar heel  Frequency of Pain: worse with walking or standing; start up pain  Associated symptoms? Swelling.  Previous treatment?  Stretches, OTC inserts, night splint    RHD  Mechanism of injury: none  Date of Injury/Pain: several months  Location of pain: medial elbow  Frequency of Pain: worse with lifting, exercise  Associated symptoms? Swelling.  Previous treatment? none    27 point review of systems negative except what is stated in HPI     Past Medical History:   Diagnosis Date    Allergy to seafood 12/15/2022    History of allergy to shellfish    Candidiasis, unspecified 12/09/2022    Yeast infection    Encounter for contraceptive management, unspecified 08/09/2021    Contraception management    Encounter for gynecological examination (general) (routine) without abnormal findings 09/11/2020    Women's annual routine gynecological examination    Essential (primary) hypertension 05/23/2019    HTN (hypertension), benign    Other specified noninflammatory disorders of vagina 12/06/2021    Vaginal irritation    Personal history of other diseases of the musculoskeletal system and connective tissue 05/23/2019    History of herniated intervertebral disc    Personal history of other diseases of the respiratory system     History of sinusitis    Personal history of other endocrine, nutritional and metabolic disease 05/23/2019    History of hypercholesterolemia    Personal history of other infectious and parasitic diseases 12/13/2021    History of trichomonal vaginitis    Personal history of other medical treatment 05/20/2021    History of  screening mammography    Personal history of urinary (tract) infections     History of urinary tract infection        Allergies   Allergen Reactions    Bee Venom Protein (Honey Bee) Unknown and Shortness of breath     Severe allergy reaction to wasp    Dog Dander Unknown        Past Surgical History:   Procedure Laterality Date    BREAST BIOPSY      OTHER SURGICAL HISTORY  2019     section        Family History   Problem Relation Name Age of Onset    No Known Problems Mother      Diabetes Other      Hypertension Other      Cancer Other          Social History     Socioeconomic History    Marital status:      Spouse name: Not on file    Number of children: Not on file    Years of education: Not on file    Highest education level: Not on file   Occupational History    Not on file   Tobacco Use    Smoking status: Former     Current packs/day: 0.00     Types: Cigarettes     Quit date:      Years since quittin.5    Smokeless tobacco: Never   Substance and Sexual Activity    Alcohol use: Yes     Comment: social    Drug use: Never    Sexual activity: Not on file   Other Topics Concern    Not on file   Social History Narrative    Not on file     Social Determinants of Health     Financial Resource Strain: Not on File (2019)    [facility]    Financial Resource Strain     Financial Resource Strain: 0   Food Insecurity: No Food Insecurity (2024)    Hunger Vital Sign     Worried About Running Out of Food in the Last Year: Never true     Ran Out of Food in the Last Year: Never true   Transportation Needs: Not on File (2019)    [facility] from [facility]    Transportation Needs   Physical Activity: Not on File (2019)    Received from [facility]    Physical Activity   Stress: Not on File (2019)    Received from [facility]    Stress   Social Connections: Not on File (2019)    Received from [facility]    Social Connections     Social Connections and Isolation: 0   Intimate  "Partner Violence: Not on file   Housing Stability: Not on File (8/26/2019)    Received from [facility]        CURRENT MEDICATIONS:   Current Outpatient Medications   Medication Sig Dispense Refill    albuterol 2.5 mg /3 mL (0.083 %) nebulizer solution Take 3 mL (2.5 mg) by nebulization every 6 hours if needed for wheezing. 1080 mL 3    albuterol 90 mcg/actuation inhaler INHALE 2 PUFFS BY MOUTH EVERY 4 HOURS AS NEEDED FOR WHEEZING 6.7 g 2    alcohol swabs (Alcohol Pads) pads, medicated Use as directed for diabetes care 300 each 3    amLODIPine (Norvasc) 5 mg tablet Take 1 tablet (5 mg) by mouth once daily. 90 tablet 1    azelastine (Astelin) 137 mcg (0.1 %) nasal spray Administer 1 spray into each nostril 2 times a day. Use in each nostril as directed 30 mL 3    BD Ultra-Fine April Pen Needle 32 gauge x 5/32\" needle       benralizumab (Fasenra Pen) 30 mg/mL injection Inject 1 mL (30 mg) under the skin every 28 (twenty-eight) days for 3 doses. 1 mL 1    blood sugar diagnostic (Accu-Chek Guide test strips) strip Use as directed to test sugars up to 3 times daily 300 each 3    blood sugar diagnostic strip USE AS DIRECTED TO TEST BLOOD SUGAR THREE TIMES A  each 0    blood-glucose meter (Accu-Chek Guide Glucose Meter) misc Use to check glucose as directed 1 each 0    dupilumab (Dupixent Pen) 300 mg/2 mL pen injector Inject 300 mg (1 pen) under the skin every 14 (fourteen) days. 4 mL 11    fluticasone (Flonase) 50 mcg/actuation nasal spray USE 1 SPRAY IN EACH NOSTRIL EVERY 12 HOURS 16 g 3    fluticasone propion-salmeteroL (Advair Diskus) 500-50 mcg/dose diskus inhaler Inhale 1 puff 2 times a day. 60 each 3    lancets (Accu-Chek Softclix Lancets) misc Use 1 new lancet to test blood sugar 3 times a day 300 each 3    magnesium 30 mg tablet Take 1 tablet (30 mg) by mouth 2 times a day.      meloxicam (Mobic) 15 mg tablet Take 1 tablet (15 mg) by mouth once daily. 14 tablet 0    montelukast (Singulair) 10 " mg tablet Take 1 tablet (10 mg) by mouth once daily at bedtime. 90 tablet 3    pantoprazole (ProtoNix) 40 mg EC tablet TAKE 1 TABLET BY MOUTH ONCE DAILY 90 tablet 0    PARoxetine (Paxil) 10 mg tablet Take 1 tablet (10 mg) by mouth once daily in the morning. 90 tablet 0    rosuvastatin (Crestor) 20 mg tablet TAKE 1 TABLET BY MOUTH ONCE DAILY 90 tablet 3    semaglutide (Ozempic) 0.25 mg or 0.5 mg (2 mg/3 mL) pen injector Inject 0.5 mg under the skin 1 (one) time per week. 9 mL 3    tiotropium (Spiriva Respimat) 2.5 mcg/actuation inhaler Inhale 2 puffs by mouth once daily. 4 g 3    umeclidinium (Incruse Ellipta) 62.5 mcg/actuation inhalation Inhale 1 puff (62.5 mcg) once daily. 30 each 3    vitamin E 45 mg (100 unit) capsule Take by mouth once daily.       No current facility-administered medications for this visit.        Constitutional Exam: patient's height and weight reviewed, well-kempt  Psychiatric Exam: alert and oriented x 3, appropriate mood and behavior  Eye Exam: GREGORIA, EOMI  Pulmonary Exam: breathing non-labored, no apparent distress  Lymphatic exam: no appreciable lymphadenopathy in the lower extremities  Cardiovascular exam: DP pulses 2+ bilaterally, PT 2+ bilaterally, toes are pink with good capillary refill, no pitting edema  Skin exam: no open lesions, rashes, abrasions or ulcerations  Neurological exam: sensation to light touch intact in both lower extremities in peripheral and dermatomal distributions (except for any abnormalities noted in musculoskeletal exam)      On examination of the right foot; patient is able to weight bear, no limping gait. pes planus alignment. No swelling, ecchymosis, erythema, lesions, ulcers or atrophy; Normal ROM at 2-5th toe flexion/extension and 1st MTP extension; 5/5 strength with great toe flexion/extension and resisted inversion/eversion); Achilles tightness. Tenderness to palpation over heel and plantar arch. No tenderness to palpation over the base of 1st  "metatarsal/2nd metatarsal, sesamoids, 5th metatarsal, medial cuneiform, navicular, 1st MTP joint or 2nd or 3rd intermetarsal space.  Neurovascularly intact.  Normal sensation to light touch. normal proprioception. Doralis pedis pulse and posterior tibial pulse 2+ b/l. Good capillary refill. negative foot squeeze test, positive \"too many toes sign\", negative calcaneal squeeze test    Contralateral foot: normal pes planus alignment       1. Medial epicondylitis of elbow, unspecified laterality  Referral to Physical Therapy    Walking Boot Short      2. Plantar fasciitis of right foot  Referral to Physical Therapy    Tennis Elbow Strap          ASSESSMENT: right foot plantar fasciitis, B/L foot PTTD grade 2    PLAN: Treatment options were discussed with the patient. The patient can use a plantar night splint to wear to bed for 6 weeks. They can use a frozen water bottle or tennis ball to roll under the arch. They will avoid heels and flats and wear a good supportive shoe.  Patient was given a handout and instructed on an at home stretching program.  They should do these exercises 3 times per day for 6 weeks and then daily. Patient can use OTC aspercream for pain and continue to ice and elevate supported at the calf to reduce swelling. Patient is ambulatory and was given a prescription for orthotics, which are medically necessary due to the patient's medical condition and symptomatic pes planus.  All the patient's questions were answered. The patient agrees with the above plan.  Follow up as needed          RHD/LHD  Mechanism of injury: none  Date of Injury/Pain: several months  Location of pain: medial elbow  Frequency of Pain: worse with lifting, exercise  Associated symptoms? Swelling.  Previous treatment? none      Past Medical History:   Diagnosis Date    Allergy to seafood 12/15/2022    History of allergy to shellfish    Candidiasis, unspecified 12/09/2022    Yeast infection    Encounter for contraceptive " management, unspecified 2021    Contraception management    Encounter for gynecological examination (general) (routine) without abnormal findings 2020    Women's annual routine gynecological examination    Essential (primary) hypertension 2019    HTN (hypertension), benign    Other specified noninflammatory disorders of vagina 2021    Vaginal irritation    Personal history of other diseases of the musculoskeletal system and connective tissue 2019    History of herniated intervertebral disc    Personal history of other diseases of the respiratory system     History of sinusitis    Personal history of other endocrine, nutritional and metabolic disease 2019    History of hypercholesterolemia    Personal history of other infectious and parasitic diseases 2021    History of trichomonal vaginitis    Personal history of other medical treatment 2021    History of screening mammography    Personal history of urinary (tract) infections     History of urinary tract infection        Allergies   Allergen Reactions    Bee Venom Protein (Honey Bee) Unknown and Shortness of breath     Severe allergy reaction to wasp    Dog Dander Unknown        Past Surgical History:   Procedure Laterality Date    BREAST BIOPSY      OTHER SURGICAL HISTORY  2019     section        Family History   Problem Relation Name Age of Onset    No Known Problems Mother      Diabetes Other      Hypertension Other      Cancer Other          Social History     Socioeconomic History    Marital status:      Spouse name: Not on file    Number of children: Not on file    Years of education: Not on file    Highest education level: Not on file   Occupational History    Not on file   Tobacco Use    Smoking status: Former     Current packs/day: 0.00     Types: Cigarettes     Quit date:      Years since quittin.5    Smokeless tobacco: Never   Substance and Sexual Activity    Alcohol use: Yes     " Comment: social    Drug use: Never    Sexual activity: Not on file   Other Topics Concern    Not on file   Social History Narrative    Not on file     Social Determinants of Health     Financial Resource Strain: Not on File (8/26/2019)    [facility] from [facility]    Financial Resource Strain     Financial Resource Strain: 0   Food Insecurity: No Food Insecurity (6/20/2024)    Hunger Vital Sign     Worried About Running Out of Food in the Last Year: Never true     Ran Out of Food in the Last Year: Never true   Transportation Needs: Not on File (8/26/2019)    Received from [facility]    Transportation Needs   Physical Activity: Not on File (8/26/2019)    Received from [facility]    Physical Activity   Stress: Not on File (8/26/2019)    Received from [facility]    Stress   Social Connections: Not on File (8/26/2019)    [facility] from [facility]    Social Connections     Social Connections and Isolation: 0   Intimate Partner Violence: Not on file   Housing Stability: Not on File (8/26/2019)    Received from [facility]    Housing Stability        CURRENT MEDICATIONS:   Current Outpatient Medications   Medication Sig Dispense Refill    albuterol 2.5 mg /3 mL (0.083 %) nebulizer solution Take 3 mL (2.5 mg) by nebulization every 6 hours if needed for wheezing. 1080 mL 3    albuterol 90 mcg/actuation inhaler INHALE 2 PUFFS BY MOUTH EVERY 4 HOURS AS NEEDED FOR WHEEZING 6.7 g 2    alcohol swabs (Alcohol Pads) pads, medicated Use as directed for diabetes care 300 each 3    amLODIPine (Norvasc) 5 mg tablet Take 1 tablet (5 mg) by mouth once daily. 90 tablet 1    azelastine (Astelin) 137 mcg (0.1 %) nasal spray Administer 1 spray into each nostril 2 times a day. Use in each nostril as directed 30 mL 3    BD Ultra-Fine April Pen Needle 32 gauge x 5/32\" needle       benralizumab (Fasenra Pen) 30 mg/mL injection Inject 1 mL (30 mg) under the skin every 28 (twenty-eight) days for 3 doses. 1 mL 1    blood sugar diagnostic (Accu-Chek Guide test strips) " strip Use as directed to test sugars up to 3 times daily 300 each 3    blood sugar diagnostic strip USE AS DIRECTED TO TEST BLOOD SUGAR THREE TIMES A  each 0    blood-glucose meter (Accu-Chek Guide Glucose Meter) misc Use to check glucose as directed 1 each 0    dupilumab (Dupixent Pen) 300 mg/2 mL pen injector Inject 300 mg (1 pen) under the skin every 14 (fourteen) days. 4 mL 11    fluticasone (Flonase) 50 mcg/actuation nasal spray USE 1 SPRAY IN EACH NOSTRIL EVERY 12 HOURS 16 g 3    fluticasone propion-salmeteroL (Advair Diskus) 500-50 mcg/dose diskus inhaler Inhale 1 puff 2 times a day. 60 each 3    lancets (Accu-Chek Softclix Lancets) misc Use 1 new lancet to test blood sugar 3 times a day 300 each 3    magnesium 30 mg tablet Take 1 tablet (30 mg) by mouth 2 times a day.      meloxicam (Mobic) 15 mg tablet Take 1 tablet (15 mg) by mouth once daily. 14 tablet 0    montelukast (Singulair) 10 mg tablet Take 1 tablet (10 mg) by mouth once daily at bedtime. 90 tablet 3    pantoprazole (ProtoNix) 40 mg EC tablet TAKE 1 TABLET BY MOUTH ONCE DAILY 90 tablet 0    PARoxetine (Paxil) 10 mg tablet Take 1 tablet (10 mg) by mouth once daily in the morning. 90 tablet 0    rosuvastatin (Crestor) 20 mg tablet TAKE 1 TABLET BY MOUTH ONCE DAILY 90 tablet 3    semaglutide (Ozempic) 0.25 mg or 0.5 mg (2 mg/3 mL) pen injector Inject 0.5 mg under the skin 1 (one) time per week. 9 mL 3    tiotropium (Spiriva Respimat) 2.5 mcg/actuation inhaler Inhale 2 puffs by mouth once daily. 4 g 3    umeclidinium (Incruse Ellipta) 62.5 mcg/actuation inhalation Inhale 1 puff (62.5 mcg) once daily. 30 each 3    vitamin E 45 mg (100 unit) capsule Take by mouth once daily.       No current facility-administered medications for this visit.        27 point review of systems negative except what is stated in HPI    Constitutional Exam: patient's height and weight reviewed, well-kempt  Psychiatric Exam: alert and oriented x 3, appropriate mood and  behavior  Eye Exam: GREGORIA, EOMI  Pulmonary Exam: breathing non-labored, no apparent distress  Lymphatic exam: no appreciable lymphadenopathy in the lower extremities  Cardiovascular exam: DP pulses 2+ bilaterally, PT 2+ bilaterally, toes are pink with good capillary refill, no pitting edema  Skin exam: no open lesions, rashes, abrasions or ulcerations  Neurological exam: sensation to light touch intact in both lower extremities in peripheral and dermatomal distributions (except for any abnormalities noted in musculoskeletal exam)      On examination of the b/l elbow;  Normal alignment;  Minimal swelling, no ecchymosis   Normal elbow extension,flexion, pronation and supination, Normal ROM in wrist flexion, pronation and supination, ROM finger and thumb normal  Normal strength in elbow extension, flexion, pronation and supination; wrist finger and thumb strength normal; normal  strength  Tenderness to palpation: medial epicondyle  No tenderness over the olecranon UCL, radius, ulna, medial/lateral epicondyle, scaphoid  NVI, good cap refill          ASSESSMENT/PLAN    1. Medial epicondylitis of elbow, unspecified laterality  Referral to Physical Therapy    Walking Boot Short      2. Plantar fasciitis of right foot  Referral to Physical Therapy    Tennis Elbow Strap          PLAN: I discussed with the patient the differential diagnosis, complex overuse and degenerative related nature of the condition(s) and available treatment option(s). Patient can use a bandit brace to off load the elbow.  The patient was given a prescription for occupational therapy.  Occupational therapy is medically necessary to improve strength, balance, range of motion and functional outcomes after injury and/or surgery. Patient was given a handout and instructed on an at home stretching program.  They should do these exercises 3 times per day for 6 weeks and then daily. Patient can use OTC Voltaren gel, biofreeze or  aspercream for pain. All  the patient's questions were answered. The patient agrees with the above plan.  Follow up with elbow specialist as needed    Cam Alvarenga PA-C

## 2024-07-23 ENCOUNTER — SPECIALTY PHARMACY (OUTPATIENT)
Dept: PHARMACY | Facility: CLINIC | Age: 54
End: 2024-07-23

## 2024-07-30 ENCOUNTER — APPOINTMENT (OUTPATIENT)
Dept: PRIMARY CARE | Facility: CLINIC | Age: 54
End: 2024-07-30
Payer: COMMERCIAL

## 2024-07-30 ENCOUNTER — OFFICE VISIT (OUTPATIENT)
Dept: ORTHOPEDIC SURGERY | Facility: HOSPITAL | Age: 54
End: 2024-07-30
Payer: COMMERCIAL

## 2024-07-30 ENCOUNTER — HOSPITAL ENCOUNTER (OUTPATIENT)
Dept: RADIOLOGY | Facility: HOSPITAL | Age: 54
Discharge: HOME | End: 2024-07-30
Payer: COMMERCIAL

## 2024-07-30 VITALS — HEIGHT: 62 IN | BODY MASS INDEX: 30 KG/M2 | WEIGHT: 163 LBS

## 2024-07-30 DIAGNOSIS — M79.671 RIGHT FOOT PAIN: ICD-10-CM

## 2024-07-30 DIAGNOSIS — M72.2 PLANTAR FASCIITIS OF LEFT FOOT: ICD-10-CM

## 2024-07-30 DIAGNOSIS — M72.2 PLANTAR FASCIITIS OF RIGHT FOOT: Primary | ICD-10-CM

## 2024-07-30 PROCEDURE — 73630 X-RAY EXAM OF FOOT: CPT | Mod: BILATERAL PROCEDURE | Performed by: RADIOLOGY

## 2024-07-30 PROCEDURE — 3008F BODY MASS INDEX DOCD: CPT | Performed by: PHYSICIAN ASSISTANT

## 2024-07-30 PROCEDURE — 1036F TOBACCO NON-USER: CPT | Performed by: PHYSICIAN ASSISTANT

## 2024-07-30 PROCEDURE — 73630 X-RAY EXAM OF FOOT: CPT | Mod: 50

## 2024-07-30 PROCEDURE — 99214 OFFICE O/P EST MOD 30 MIN: CPT | Performed by: PHYSICIAN ASSISTANT

## 2024-07-30 ASSESSMENT — PAIN DESCRIPTION - DESCRIPTORS: DESCRIPTORS: BURNING;SHARP;STABBING

## 2024-07-30 ASSESSMENT — PAIN - FUNCTIONAL ASSESSMENT: PAIN_FUNCTIONAL_ASSESSMENT: 0-10

## 2024-07-30 ASSESSMENT — PAIN SCALES - GENERAL: PAINLEVEL_OUTOF10: 9

## 2024-07-30 NOTE — PROGRESS NOTES
Subjective    Patient ID: Edmond Cool is a 53 y.o. female.    Chief Complaint: Pain of the Right Foot and Pain of the Left Foot           HPI:  Edmond Cool is a 53 y.o. female who presents today for complaints of bilateral foot pain.  She notes a lengthy history of plantar fasciitis in both feet.  She mostly recently had an exacerbation of symptoms in her right foot following a fall off a stepladder while hanging lights in her gazebo.  She saw Tab Alvarenga PA-C on 7/12/2024 for her complaints.  He had her start in a cam walker boot and utilize meloxicam as well as perform a home program.  She has been doing this consistently and unfortunately is not seeing any significant improvement in her symptoms.  She continues to have pain with weightbearing activities, right worse than left.  She is scheduled to start physical therapy in another couple weeks.    ROS  Constitutional: No fever, no chills, not feeling tired, no recent weight gain and no recent weight loss  ENT: No nosebleeds  Cardiovascular: No chest pain  Respiratory: No shortness of breath and no cough  Gastrointestinal: No abdominal pain, no nausea, no diarrhea, and no vomiting  Musculoskeletal: No arthralgias  Integumentary: No rashes and no skin lesions  Neurological: No headache  Psychiatric: No sleep disturbances no depression  Endocrine: No muscle weakness and no muscle cramps  Hematologic/lymphatic: No swelling glands and no tendency for easy bruising    Past Medical History:   Diagnosis Date    Allergy to seafood 12/15/2022    History of allergy to shellfish    Candidiasis, unspecified 12/09/2022    Yeast infection    Encounter for contraceptive management, unspecified 08/09/2021    Contraception management    Encounter for gynecological examination (general) (routine) without abnormal findings 09/11/2020    Women's annual routine gynecological examination    Essential (primary) hypertension 05/23/2019    HTN (hypertension), benign     "Other specified noninflammatory disorders of vagina 2021    Vaginal irritation    Personal history of other diseases of the musculoskeletal system and connective tissue 2019    History of herniated intervertebral disc    Personal history of other diseases of the respiratory system     History of sinusitis    Personal history of other endocrine, nutritional and metabolic disease 2019    History of hypercholesterolemia    Personal history of other infectious and parasitic diseases 2021    History of trichomonal vaginitis    Personal history of other medical treatment 2021    History of screening mammography    Personal history of urinary (tract) infections     History of urinary tract infection        Past Surgical History:   Procedure Laterality Date    BREAST BIOPSY      OTHER SURGICAL HISTORY  2019     section          Current Outpatient Medications:     albuterol 2.5 mg /3 mL (0.083 %) nebulizer solution, Take 3 mL (2.5 mg) by nebulization every 6 hours if needed for wheezing., Disp: 1080 mL, Rfl: 3    albuterol 90 mcg/actuation inhaler, INHALE 2 PUFFS BY MOUTH EVERY 4 HOURS AS NEEDED FOR WHEEZING, Disp: 6.7 g, Rfl: 2    alcohol swabs (Alcohol Pads) pads, medicated, Use as directed for diabetes care, Disp: 300 each, Rfl: 3    amLODIPine (Norvasc) 5 mg tablet, Take 1 tablet (5 mg) by mouth once daily., Disp: 90 tablet, Rfl: 1    azelastine (Astelin) 137 mcg (0.1 %) nasal spray, Administer 1 spray into each nostril 2 times a day. Use in each nostril as directed, Disp: 30 mL, Rfl: 3    BD Ultra-Fine April Pen Needle 32 gauge x 5/32\" needle, , Disp: , Rfl:     benralizumab (Fasenra Pen) 30 mg/mL injection, Inject 1 mL (30 mg) under the skin every 28 (twenty-eight) days for 3 doses., Disp: 1 mL, Rfl: 1    blood sugar diagnostic (Accu-Chek Guide test strips) strip, Use as directed to test sugars up to 3 times daily, Disp: 300 each, Rfl: 3    blood sugar diagnostic strip, USE AS " DIRECTED TO TEST BLOOD SUGAR THREE TIMES A DAY, Disp: 300 each, Rfl: 0    blood-glucose meter (Accu-Chek Guide Glucose Meter) misc, Use to check glucose as directed, Disp: 1 each, Rfl: 0    dupilumab (Dupixent Pen) 300 mg/2 mL pen injector, Inject 300 mg (1 pen) under the skin every 14 (fourteen) days., Disp: 4 mL, Rfl: 11    fluticasone (Flonase) 50 mcg/actuation nasal spray, USE 1 SPRAY IN EACH NOSTRIL EVERY 12 HOURS, Disp: 16 g, Rfl: 3    fluticasone propion-salmeteroL (Advair Diskus) 500-50 mcg/dose diskus inhaler, Inhale 1 puff 2 times a day., Disp: 60 each, Rfl: 3    lancets (Accu-Chek Softclix Lancets) misc, Use 1 new lancet to test blood sugar 3 times a day, Disp: 300 each, Rfl: 3    magnesium 30 mg tablet, Take 1 tablet (30 mg) by mouth 2 times a day., Disp: , Rfl:     meloxicam (Mobic) 15 mg tablet, Take 1 tablet (15 mg) by mouth once daily., Disp: 14 tablet, Rfl: 0    montelukast (Singulair) 10 mg tablet, Take 1 tablet (10 mg) by mouth once daily at bedtime., Disp: 90 tablet, Rfl: 3    pantoprazole (ProtoNix) 40 mg EC tablet, TAKE 1 TABLET BY MOUTH ONCE DAILY, Disp: 90 tablet, Rfl: 0    PARoxetine (Paxil) 10 mg tablet, Take 1 tablet (10 mg) by mouth once daily in the morning., Disp: 90 tablet, Rfl: 0    rosuvastatin (Crestor) 20 mg tablet, TAKE 1 TABLET BY MOUTH ONCE DAILY, Disp: 90 tablet, Rfl: 3    semaglutide (Ozempic) 0.25 mg or 0.5 mg (2 mg/3 mL) pen injector, Inject 0.5 mg under the skin 1 (one) time per week., Disp: 9 mL, Rfl: 3    tiotropium (Spiriva Respimat) 2.5 mcg/actuation inhaler, Inhale 2 puffs by mouth once daily., Disp: 4 g, Rfl: 3    umeclidinium (Incruse Ellipta) 62.5 mcg/actuation inhalation, Inhale 1 puff (62.5 mcg) once daily., Disp: 30 each, Rfl: 3    vitamin E 45 mg (100 unit) capsule, Take by mouth once daily., Disp: , Rfl:      Allergies   Allergen Reactions    Bee Venom Protein (Honey Bee) Unknown and Shortness of breath     Severe allergy reaction to wasp    Dog Dander Unknown         Social Connections: Not on File (8/26/2019)    Received from [facility]    Social Yeni     Social Connections and Isolation: 0          Objective   53-year-old female well appearing in no acute distress. Alert and oriented ×3.  Skin intact bilateral lower extremities.   Normal tandem gait. Coordination and balance intact.  Bilateral lower extremity compartments supple.  5 out of 5 distal motor strength bilaterally.  L4 through S1 sensation intact bilaterally.  2+ DP/PT pulses bilaterally.  She has full and symmetric range of motion in all directions in both ankles.  She is tender palpation over the plantar aspect of her calcaneus bilaterally, right worse than left.  No tenderness over the medial or lateral aspect or the posterior portion of her right or left calcaneus.    Image Results:  X-rays of both feet taken today in the office were reviewed.  Both feet show calcaneal spurs present, left slightly larger than right      Assessment/Plan   Encounter Diagnoses:  Plantar fasciitis of right foot    Right foot pain    Plantar fasciitis of left foot    No orders of the defined types were placed in this encounter.      I reiterated the need for at home care.  She should try to walk around in tennis shoes is much as possible.  She can start to wean out of the boot.  She was instructed on how to ice and stretch at home.  She also follow through with physical therapy.  PRP injections may be a consideration in the future.  If symptoms do not seem to improve I would encourage her to follow-up with Dr. Beltrán.    This office note was dictated using Dragon voice to text software and was not proofread for spelling or grammatical errors

## 2024-07-31 PROCEDURE — RXMED WILLOW AMBULATORY MEDICATION CHARGE

## 2024-08-01 ENCOUNTER — PHARMACY VISIT (OUTPATIENT)
Dept: PHARMACY | Facility: CLINIC | Age: 54
End: 2024-08-01
Payer: COMMERCIAL

## 2024-08-05 ENCOUNTER — PHARMACY VISIT (OUTPATIENT)
Dept: PHARMACY | Facility: CLINIC | Age: 54
End: 2024-08-05

## 2024-08-12 ENCOUNTER — APPOINTMENT (OUTPATIENT)
Dept: OBSTETRICS AND GYNECOLOGY | Facility: CLINIC | Age: 54
End: 2024-08-12
Payer: COMMERCIAL

## 2024-08-28 DIAGNOSIS — J45.21 MILD INTERMITTENT ASTHMATIC BRONCHITIS WITH ACUTE EXACERBATION (HHS-HCC): ICD-10-CM

## 2024-08-28 DIAGNOSIS — I10 BENIGN ESSENTIAL HYPERTENSION: ICD-10-CM

## 2024-08-28 PROCEDURE — RXMED WILLOW AMBULATORY MEDICATION CHARGE

## 2024-08-28 RX ORDER — ALBUTEROL SULFATE 90 UG/1
2 INHALANT RESPIRATORY (INHALATION) EVERY 4 HOURS PRN
Qty: 6.7 G | Refills: 2 | Status: SHIPPED | OUTPATIENT
Start: 2024-08-28 | End: 2025-08-28

## 2024-08-28 RX ORDER — AMLODIPINE BESYLATE 5 MG/1
5 TABLET ORAL DAILY
Qty: 90 TABLET | Refills: 1 | Status: SHIPPED | OUTPATIENT
Start: 2024-08-28 | End: 2025-02-24

## 2024-08-29 ENCOUNTER — PHARMACY VISIT (OUTPATIENT)
Dept: PHARMACY | Facility: CLINIC | Age: 54
End: 2024-08-29
Payer: COMMERCIAL

## 2024-09-03 ENCOUNTER — PHARMACY VISIT (OUTPATIENT)
Dept: PHARMACY | Facility: CLINIC | Age: 54
End: 2024-09-03

## 2024-09-09 ENCOUNTER — OFFICE VISIT (OUTPATIENT)
Dept: ORTHOPEDIC SURGERY | Facility: HOSPITAL | Age: 54
End: 2024-09-09
Payer: COMMERCIAL

## 2024-09-09 DIAGNOSIS — M76.821 POSTERIOR TIBIAL TENDON DYSFUNCTION, RIGHT: ICD-10-CM

## 2024-09-09 DIAGNOSIS — M72.2 PLANTAR FASCIITIS, RIGHT: Primary | ICD-10-CM

## 2024-09-09 DIAGNOSIS — M67.01 CONTRACTURE OF RIGHT ACHILLES TENDON DUE TO NON-NEUROLOGIC CAUSE: ICD-10-CM

## 2024-09-09 PROCEDURE — 99214 OFFICE O/P EST MOD 30 MIN: CPT | Performed by: ORTHOPAEDIC SURGERY

## 2024-09-09 NOTE — PROGRESS NOTES
HISTORY OF PRESENT ILLNESS  This is a pleasant 53 y.o. year old female  who presents on 09/09/2024 at the request of Dvaid Skelton MD for evaluation of right heel pain that has been present over/since  few months .    How the condition occurred or started: insidious, no specific trauma  Location of pain (patient points to): plantar fascia  Quality of pain: Moderate  Modifying Factors: start up pain  Associated Signs and symptoms: no N/T  Previous Treatment: heel lifts  PMH DMII last hgba1c 5.9 2023, hx covid pneumonia    PHYSICAL EXAMINATION  Constitutional Exam: patient's height and weight reviewed, well-kempt  Psychiatric Exam: alert and oriented x 3, appropriate mood and behavior  Eye Exam: GREGORIA, EOMI  Pulmonary Exam: breathing non-labored, no apparent distress  Lymphatic exam: no appreciable lymphadenopathy in the lower extremities  Cardiovascular exam: DP pulses 2+ bilaterally, PT 2+ bilaterally, toes are pink with good capillary refill, no pitting edema  Skin exam: no open lesions, rashes, abrasions or ulcerations  Neurological exam: sensation to light touch intact in both lower extremities in peripheral and dermatomal distributions (except for any abnormalities noted in musculoskeletal exam)    Musculoskeletal exam: right foot and ankle: pain over medial band of plantar fascia with no palpable defect, tight achilles, tight hamstrings, negative calcaneal squeeze test, stable ligamentous exam, increased hindfoot valgus, no pain over posterior tibial tendon    DATA/RESULTS REVIEW: I personally reviewed the patient's x-ray images and reports of the the right foot and show no acute findings or fractures, plantar shelf, mild midfoot sag .     ASSESSMENT: plantar fasciitis right, gastrocsoleus tightness  PLAN: I discussed with the patient the differential diagnosis, complex overuse and degenerative related nature of the condition(s) and available treatment option(s).  Discussed with her that conservative  treatment is very successful in resolving PF even in patients with condition for 2 years.  It is important that she do the plantar fascial stretching protocol that we gave her today- three times a day for 5-10 minutes and then daily afterwards to maintain flexibility.  Recommend use of PF night splint continuously for 6 weeks.  Use boot for one week WBAT and wean over 4 days into her Mungo athletic shoe and spenco inserts (she has mild PTTD).  She would like to try spenco inserts instead of getting custom orthotics.  We would like to avoid cortisone injection.  FUV prn.  The patient's questions were answered in detail.      Note dictated with [employer] software, completed without full type editing to avoid delay.

## 2024-09-09 NOTE — PATIENT INSTRUCTIONS
The stretching program (see handout) should be done for about 10 minutes, three times a day.  You should stretch for 3 times a day for 6 weeks and then daily afterwards to maintain your flexibility.    [ ] If you received a plantar fascial night splint, then it should be worn for 6 weeks nightly and as needed after that period of time.  This is to minimize your stiffness and pain with the first few steps in the morning.    For additional relief, here are some more suggestions.  Lotions such as aspercreme that are available over the counter are helpful to rub in areas of heel pain up to three times daily.  You can also freeze a water bottle and roll the bottom of your foot over it.  Also, a heel cushion or heel cup that is available at local pharmacies or sports stores can be put in your shoe for extra cushioning.     [ ] Finally, if you were given a prescription for orthotics, then once the inserts are made, you can use the orthotics in your shoes to provide support and cushioning.    [ ] If you are wearing a boot and doing your stretching program that we showed you and are not in physical therapy, you can start weaning out of the boot using these guidelines and protocol.  DAY 1-- come out of boot for 1 hour (wear supportive athletic shoes), rest of the day in boot  DAY 2-- come out of boot for 2 hours (wear supportive athletic shoes), rest of the day in boot  DAY 3-- come out of boot for 3 hours (wear supportive athletic shoes), rest of the day in boot  DAY 4-- come out of boot for 4 hours (wear supportive athletic shoes), rest of the day in boot  DAY 5-- come out of boot and wear supportive shoes  * if you have pain while weaning out of boot then go back one day in the protocol (i.e. If really sore on the morning of Day 3 from coming out of boot for 2 hours the previous day, go back to Day 1 and start again through the protocol)

## 2024-09-25 DIAGNOSIS — Z12.11 SPECIAL SCREENING FOR MALIGNANT NEOPLASMS, COLON: ICD-10-CM

## 2024-09-25 RX ORDER — SODIUM, POTASSIUM,MAG SULFATES 17.5-3.13G
SOLUTION, RECONSTITUTED, ORAL ORAL
Qty: 354 ML | Refills: 0 | Status: SHIPPED | OUTPATIENT
Start: 2024-09-25

## 2024-09-29 DIAGNOSIS — K21.9 GASTROESOPHAGEAL REFLUX DISEASE WITHOUT ESOPHAGITIS: ICD-10-CM

## 2024-09-30 PROCEDURE — RXMED WILLOW AMBULATORY MEDICATION CHARGE

## 2024-09-30 RX ORDER — PANTOPRAZOLE SODIUM 40 MG/1
40 TABLET, DELAYED RELEASE ORAL DAILY
Qty: 90 TABLET | Refills: 0 | Status: SHIPPED | OUTPATIENT
Start: 2024-09-30 | End: 2025-09-30

## 2024-10-02 ENCOUNTER — PHARMACY VISIT (OUTPATIENT)
Dept: PHARMACY | Facility: CLINIC | Age: 54
End: 2024-10-02
Payer: COMMERCIAL

## 2024-10-04 ENCOUNTER — APPOINTMENT (OUTPATIENT)
Dept: ENDOCRINOLOGY | Facility: CLINIC | Age: 54
End: 2024-10-04
Payer: COMMERCIAL

## 2024-10-09 ENCOUNTER — APPOINTMENT (OUTPATIENT)
Dept: ENDOCRINOLOGY | Facility: CLINIC | Age: 54
End: 2024-10-09
Payer: COMMERCIAL

## 2024-10-21 ENCOUNTER — APPOINTMENT (OUTPATIENT)
Dept: OBSTETRICS AND GYNECOLOGY | Facility: CLINIC | Age: 54
End: 2024-10-21
Payer: COMMERCIAL

## 2024-10-21 ENCOUNTER — APPOINTMENT (OUTPATIENT)
Dept: PRIMARY CARE | Facility: CLINIC | Age: 54
End: 2024-10-21
Payer: COMMERCIAL

## 2024-10-21 VITALS
HEIGHT: 62 IN | HEART RATE: 74 BPM | SYSTOLIC BLOOD PRESSURE: 134 MMHG | BODY MASS INDEX: 30.18 KG/M2 | WEIGHT: 164 LBS | DIASTOLIC BLOOD PRESSURE: 90 MMHG

## 2024-10-21 VITALS — WEIGHT: 164 LBS | BODY MASS INDEX: 30 KG/M2 | SYSTOLIC BLOOD PRESSURE: 131 MMHG | DIASTOLIC BLOOD PRESSURE: 84 MMHG

## 2024-10-21 DIAGNOSIS — Z23 NEED FOR INFLUENZA VACCINATION: ICD-10-CM

## 2024-10-21 DIAGNOSIS — E55.9 VITAMIN D DEFICIENCY: ICD-10-CM

## 2024-10-21 DIAGNOSIS — N95.1 PERIMENOPAUSAL: ICD-10-CM

## 2024-10-21 DIAGNOSIS — J45.50 SEVERE PERSISTENT ASTHMA WITHOUT COMPLICATION (MULTI): ICD-10-CM

## 2024-10-21 DIAGNOSIS — Z11.3 SCREEN FOR STD (SEXUALLY TRANSMITTED DISEASE): ICD-10-CM

## 2024-10-21 DIAGNOSIS — E78.49 ESSENTIAL FAMILIAL HYPERLIPIDEMIA: ICD-10-CM

## 2024-10-21 DIAGNOSIS — K21.9 GERD WITHOUT ESOPHAGITIS: ICD-10-CM

## 2024-10-21 DIAGNOSIS — Z01.419 ENCOUNTER FOR GYNECOLOGICAL EXAMINATION: Primary | ICD-10-CM

## 2024-10-21 DIAGNOSIS — I10 BENIGN ESSENTIAL HYPERTENSION: Primary | ICD-10-CM

## 2024-10-21 DIAGNOSIS — E08.00 DIABETES MELLITUS DUE TO UNDERLYING CONDITION WITH HYPEROSMOLARITY WITHOUT COMA, WITHOUT LONG-TERM CURRENT USE OF INSULIN: ICD-10-CM

## 2024-10-21 PROCEDURE — 3075F SYST BP GE 130 - 139MM HG: CPT | Performed by: FAMILY MEDICINE

## 2024-10-21 PROCEDURE — 3008F BODY MASS INDEX DOCD: CPT | Performed by: FAMILY MEDICINE

## 2024-10-21 PROCEDURE — 99214 OFFICE O/P EST MOD 30 MIN: CPT | Performed by: FAMILY MEDICINE

## 2024-10-21 PROCEDURE — 3075F SYST BP GE 130 - 139MM HG: CPT | Performed by: OBSTETRICS & GYNECOLOGY

## 2024-10-21 PROCEDURE — 3079F DIAST BP 80-89 MM HG: CPT | Performed by: OBSTETRICS & GYNECOLOGY

## 2024-10-21 PROCEDURE — 99396 PREV VISIT EST AGE 40-64: CPT | Performed by: OBSTETRICS & GYNECOLOGY

## 2024-10-21 PROCEDURE — 90471 IMMUNIZATION ADMIN: CPT | Performed by: FAMILY MEDICINE

## 2024-10-21 PROCEDURE — 1036F TOBACCO NON-USER: CPT | Performed by: FAMILY MEDICINE

## 2024-10-21 PROCEDURE — 90656 IIV3 VACC NO PRSV 0.5 ML IM: CPT | Performed by: FAMILY MEDICINE

## 2024-10-21 PROCEDURE — 1036F TOBACCO NON-USER: CPT | Performed by: OBSTETRICS & GYNECOLOGY

## 2024-10-21 PROCEDURE — 3080F DIAST BP >= 90 MM HG: CPT | Performed by: FAMILY MEDICINE

## 2024-10-21 ASSESSMENT — ENCOUNTER SYMPTOMS
WHEEZING: 1
ENDOCRINE NEGATIVE: 0
MUSCULOSKELETAL NEGATIVE: 0
GASTROINTESTINAL NEGATIVE: 0
CONSTITUTIONAL NEGATIVE: 1
OCCASIONAL FEELINGS OF UNSTEADINESS: 0
DEPRESSION: 0
HEMATOLOGIC/LYMPHATIC NEGATIVE: 0
NEUROLOGICAL NEGATIVE: 0
MUSCULOSKELETAL NEGATIVE: 1
LOSS OF SENSATION IN FEET: 0
EYES NEGATIVE: 0
PSYCHIATRIC NEGATIVE: 0
ALLERGIC/IMMUNOLOGIC NEGATIVE: 0
CARDIOVASCULAR NEGATIVE: 0
RESPIRATORY NEGATIVE: 0
GASTROINTESTINAL NEGATIVE: 1
NEUROLOGICAL NEGATIVE: 1
COUGH: 1
CARDIOVASCULAR NEGATIVE: 1
CONSTITUTIONAL NEGATIVE: 0

## 2024-10-21 ASSESSMENT — PAIN SCALES - GENERAL: PAINLEVEL_OUTOF10: 0-NO PAIN

## 2024-10-21 ASSESSMENT — PATIENT HEALTH QUESTIONNAIRE - PHQ9
2. FEELING DOWN, DEPRESSED OR HOPELESS: NOT AT ALL
SUM OF ALL RESPONSES TO PHQ9 QUESTIONS 1 AND 2: 0
1. LITTLE INTEREST OR PLEASURE IN DOING THINGS: NOT AT ALL

## 2024-10-21 NOTE — PROGRESS NOTES
Pt comes in for fu. Pt has no concerns today. Pt seeing her endo and will have her labs drawn with them. Pt states if you order labs will get all done at once.

## 2024-10-21 NOTE — PROGRESS NOTES
"Subjective   Patient ID: Edmond Cool is a 53 y.o. female who presents for No chief complaint on file..    HPI dm, severe persisztnat asthma , htn, chol, gerd    Review of Systems   Constitutional: Negative.    HENT: Negative.     Respiratory:  Positive for cough and wheezing.         Asthma aboujt 80 percent cont , insurance not covedring spiriva or danoro but will be changing insurance this month so may have opportunity to add thesse in futj   Cardiovascular: Negative.    Gastrointestinal: Negative.    Genitourinary:  Positive for enuresis.   Musculoskeletal: Negative.    Neurological: Negative.        Objective   /90   Pulse 74   Ht 1.575 m (5' 2\")   Wt 74.4 kg (164 lb)   BMI 30.00 kg/m²     Physical Exam  Vitals reviewed.   Constitutional:       Appearance: Normal appearance. She is normal weight.   Eyes:      Extraocular Movements: Extraocular movements intact.      Conjunctiva/sclera: Conjunctivae normal.      Pupils: Pupils are equal, round, and reactive to light.   Cardiovascular:      Rate and Rhythm: Normal rate and regular rhythm.      Pulses: Normal pulses.      Heart sounds: Normal heart sounds.   Pulmonary:      Effort: Pulmonary effort is normal.      Breath sounds: Normal breath sounds.   Abdominal:      General: Bowel sounds are normal.      Palpations: Abdomen is soft.   Musculoskeletal:         General: Normal range of motion.   Skin:     General: Skin is warm and dry.   Neurological:      General: No focal deficit present.      Mental Status: She is alert and oriented to person, place, and time. Mental status is at baseline.         Assessment/Plan   Problem List Items Addressed This Visit             ICD-10-CM    Benign essential hypertension - Primary I10    Relevant Orders    Comprehensive Metabolic Panel    Diabetes mellitus (Multi) E11.9    Relevant Orders    Lipid Panel    Essential familial hyperlipidemia E78.49    GERD without esophagitis K21.9    Severe persistent " asthma J45.50     Other Visit Diagnoses         Codes    Need for influenza vaccination     Z23    Relevant Orders    Flu vaccine, trivalent, preservative free, age 6 months and greater (Fluraix/Fluzone/Flulaval) (Completed)    Vitamin D deficiency     E55.9    Relevant Orders    Vitamin D 25-Hydroxy,Total (for eval of Vitamin D levels)        Htn stable ccc and fu will get bw and fu  Give flu shot  Chol will check level and fu  Asthma would benefit from anoro or spiriva so jazmin she stgarts new insura nce3 in 2 wks may be able to reconsider  Dm showed am sugarss and all undedr 140 contt current care and fu  Due for colonosco;py  Will be due for mammo in december

## 2024-10-23 ENCOUNTER — LAB (OUTPATIENT)
Dept: LAB | Facility: LAB | Age: 54
End: 2024-10-23
Payer: COMMERCIAL

## 2024-10-23 DIAGNOSIS — Z11.3 SCREEN FOR STD (SEXUALLY TRANSMITTED DISEASE): ICD-10-CM

## 2024-10-23 DIAGNOSIS — K21.9 GASTROESOPHAGEAL REFLUX DISEASE WITHOUT ESOPHAGITIS: ICD-10-CM

## 2024-10-23 DIAGNOSIS — J45.41 MODERATE PERSISTENT ASTHMATIC BRONCHITIS WITH ACUTE EXACERBATION (HHS-HCC): ICD-10-CM

## 2024-10-23 DIAGNOSIS — R05.3 PERSISTENT COUGH: ICD-10-CM

## 2024-10-23 PROCEDURE — 87591 N.GONORRHOEAE DNA AMP PROB: CPT

## 2024-10-23 PROCEDURE — RXMED WILLOW AMBULATORY MEDICATION CHARGE

## 2024-10-23 PROCEDURE — 87491 CHLMYD TRACH DNA AMP PROBE: CPT

## 2024-10-23 PROCEDURE — 87661 TRICHOMONAS VAGINALIS AMPLIF: CPT

## 2024-10-23 RX ORDER — MONTELUKAST SODIUM 10 MG/1
10 TABLET ORAL NIGHTLY
Qty: 90 TABLET | Refills: 3 | Status: CANCELLED | OUTPATIENT
Start: 2024-10-23

## 2024-10-24 ENCOUNTER — LAB (OUTPATIENT)
Dept: LAB | Facility: LAB | Age: 54
End: 2024-10-24
Payer: COMMERCIAL

## 2024-10-24 DIAGNOSIS — E08.00 DIABETES MELLITUS DUE TO UNDERLYING CONDITION WITH HYPEROSMOLARITY WITHOUT COMA, WITHOUT LONG-TERM CURRENT USE OF INSULIN: ICD-10-CM

## 2024-10-24 DIAGNOSIS — Z11.3 SCREEN FOR STD (SEXUALLY TRANSMITTED DISEASE): ICD-10-CM

## 2024-10-24 DIAGNOSIS — E55.9 VITAMIN D DEFICIENCY: ICD-10-CM

## 2024-10-24 DIAGNOSIS — N95.1 PERIMENOPAUSAL: ICD-10-CM

## 2024-10-24 DIAGNOSIS — I10 BENIGN ESSENTIAL HYPERTENSION: ICD-10-CM

## 2024-10-24 LAB
25(OH)D3 SERPL-MCNC: 37 NG/ML (ref 30–100)
ALBUMIN SERPL BCP-MCNC: 4.5 G/DL (ref 3.4–5)
ALP SERPL-CCNC: 168 U/L (ref 33–110)
ALT SERPL W P-5'-P-CCNC: 47 U/L (ref 7–45)
ANION GAP SERPL CALC-SCNC: 13 MMOL/L (ref 10–20)
AST SERPL W P-5'-P-CCNC: 44 U/L (ref 9–39)
BILIRUB SERPL-MCNC: 0.5 MG/DL (ref 0–1.2)
BUN SERPL-MCNC: 14 MG/DL (ref 6–23)
C TRACH RRNA SPEC QL NAA+PROBE: NEGATIVE
CALCIUM SERPL-MCNC: 9.1 MG/DL (ref 8.6–10.3)
CHLORIDE SERPL-SCNC: 104 MMOL/L (ref 98–107)
CHOLEST SERPL-MCNC: 161 MG/DL (ref 0–199)
CHOLESTEROL/HDL RATIO: 2.7
CO2 SERPL-SCNC: 27 MMOL/L (ref 21–32)
CREAT SERPL-MCNC: 0.52 MG/DL (ref 0.5–1.05)
EGFRCR SERPLBLD CKD-EPI 2021: >90 ML/MIN/1.73M*2
FSH SERPL-ACNC: 21.1 IU/L
GLUCOSE SERPL-MCNC: 109 MG/DL (ref 74–99)
HBV SURFACE AG SERPL QL IA: NONREACTIVE
HCV AB SER QL: NONREACTIVE
HDLC SERPL-MCNC: 58.6 MG/DL
HIV 1+2 AB+HIV1 P24 AG SERPL QL IA: NONREACTIVE
LDLC SERPL CALC-MCNC: 73 MG/DL
LH SERPL-ACNC: 6.4 IU/L
N GONORRHOEA DNA SPEC QL PROBE+SIG AMP: NEGATIVE
NON HDL CHOLESTEROL: 102 MG/DL (ref 0–149)
POTASSIUM SERPL-SCNC: 4.5 MMOL/L (ref 3.5–5.3)
PROT SERPL-MCNC: 7.6 G/DL (ref 6.4–8.2)
SODIUM SERPL-SCNC: 139 MMOL/L (ref 136–145)
T VAGINALIS RRNA SPEC QL NAA+PROBE: NEGATIVE
TREPONEMA PALLIDUM IGG+IGM AB [PRESENCE] IN SERUM OR PLASMA BY IMMUNOASSAY: NONREACTIVE
TRIGL SERPL-MCNC: 146 MG/DL (ref 0–149)
VLDL: 29 MG/DL (ref 0–40)

## 2024-10-24 PROCEDURE — 86803 HEPATITIS C AB TEST: CPT

## 2024-10-24 PROCEDURE — 83002 ASSAY OF GONADOTROPIN (LH): CPT

## 2024-10-24 PROCEDURE — 87389 HIV-1 AG W/HIV-1&-2 AB AG IA: CPT

## 2024-10-24 PROCEDURE — 80061 LIPID PANEL: CPT

## 2024-10-24 PROCEDURE — 83001 ASSAY OF GONADOTROPIN (FSH): CPT

## 2024-10-24 PROCEDURE — 87340 HEPATITIS B SURFACE AG IA: CPT

## 2024-10-24 PROCEDURE — 80053 COMPREHEN METABOLIC PANEL: CPT

## 2024-10-24 PROCEDURE — 86780 TREPONEMA PALLIDUM: CPT

## 2024-10-24 PROCEDURE — 36415 COLL VENOUS BLD VENIPUNCTURE: CPT

## 2024-10-24 PROCEDURE — 82306 VITAMIN D 25 HYDROXY: CPT

## 2024-10-24 RX ORDER — PANTOPRAZOLE SODIUM 40 MG/1
40 TABLET, DELAYED RELEASE ORAL DAILY
Qty: 90 TABLET | Refills: 0 | Status: SHIPPED | OUTPATIENT
Start: 2024-10-24 | End: 2025-10-24

## 2024-10-24 NOTE — TELEPHONE ENCOUNTER
Spoke to patient     ----- Message from David Skelton sent at 10/24/2024 12:58 PM EDT -----  Please call the patient regarding her abnormal result.  Liver enzymes a bit elevated need to avoid alcohol

## 2024-10-25 ENCOUNTER — PHARMACY VISIT (OUTPATIENT)
Dept: PHARMACY | Facility: CLINIC | Age: 54
End: 2024-10-25
Payer: COMMERCIAL

## 2024-10-25 ENCOUNTER — APPOINTMENT (OUTPATIENT)
Dept: ENDOCRINOLOGY | Facility: CLINIC | Age: 54
End: 2024-10-25
Payer: COMMERCIAL

## 2024-10-25 VITALS
HEART RATE: 93 BPM | HEIGHT: 62 IN | SYSTOLIC BLOOD PRESSURE: 112 MMHG | WEIGHT: 164 LBS | BODY MASS INDEX: 30.18 KG/M2 | RESPIRATION RATE: 20 BRPM | DIASTOLIC BLOOD PRESSURE: 67 MMHG | TEMPERATURE: 98.2 F

## 2024-10-25 DIAGNOSIS — E11.69 HYPERLIPIDEMIA DUE TO TYPE 2 DIABETES MELLITUS (MULTI): ICD-10-CM

## 2024-10-25 DIAGNOSIS — E11.9 TYPE 2 DIABETES MELLITUS WITHOUT COMPLICATION, WITHOUT LONG-TERM CURRENT USE OF INSULIN (MULTI): Primary | ICD-10-CM

## 2024-10-25 DIAGNOSIS — E66.811 CLASS 1 OBESITY DUE TO EXCESS CALORIES WITH SERIOUS COMORBIDITY AND BODY MASS INDEX (BMI) OF 30.0 TO 30.9 IN ADULT: ICD-10-CM

## 2024-10-25 DIAGNOSIS — I10 HYPERTENSION, UNSPECIFIED TYPE: ICD-10-CM

## 2024-10-25 DIAGNOSIS — E78.5 HYPERLIPIDEMIA DUE TO TYPE 2 DIABETES MELLITUS (MULTI): ICD-10-CM

## 2024-10-25 DIAGNOSIS — E66.09 CLASS 1 OBESITY DUE TO EXCESS CALORIES WITH SERIOUS COMORBIDITY AND BODY MASS INDEX (BMI) OF 30.0 TO 30.9 IN ADULT: ICD-10-CM

## 2024-10-25 LAB — POC HEMOGLOBIN A1C: 5.8 % (ref 4.2–6.5)

## 2024-10-25 PROCEDURE — 99214 OFFICE O/P EST MOD 30 MIN: CPT | Performed by: NURSE PRACTITIONER

## 2024-10-25 PROCEDURE — 1036F TOBACCO NON-USER: CPT | Performed by: NURSE PRACTITIONER

## 2024-10-25 PROCEDURE — 3074F SYST BP LT 130 MM HG: CPT | Performed by: NURSE PRACTITIONER

## 2024-10-25 PROCEDURE — 3048F LDL-C <100 MG/DL: CPT | Performed by: NURSE PRACTITIONER

## 2024-10-25 PROCEDURE — RXMED WILLOW AMBULATORY MEDICATION CHARGE

## 2024-10-25 PROCEDURE — 3008F BODY MASS INDEX DOCD: CPT | Performed by: NURSE PRACTITIONER

## 2024-10-25 PROCEDURE — 83036 HEMOGLOBIN GLYCOSYLATED A1C: CPT | Performed by: NURSE PRACTITIONER

## 2024-10-25 PROCEDURE — 3078F DIAST BP <80 MM HG: CPT | Performed by: NURSE PRACTITIONER

## 2024-10-25 RX ORDER — SEMAGLUTIDE 1.34 MG/ML
1 INJECTION, SOLUTION SUBCUTANEOUS
Qty: 3 ML | Refills: 11 | Status: SHIPPED | OUTPATIENT
Start: 2024-10-25

## 2024-10-25 ASSESSMENT — ENCOUNTER SYMPTOMS
NUMBNESS: 0
FREQUENCY: 0
DIZZINESS: 0
NAUSEA: 0
SLEEP DISTURBANCE: 0
CONSTIPATION: 0
PALPITATIONS: 0
POLYPHAGIA: 0
POLYDIPSIA: 0
SEIZURES: 0
ACTIVITY CHANGE: 0
FATIGUE: 0
DIARRHEA: 0
NERVOUS/ANXIOUS: 0
WEAKNESS: 0
SHORTNESS OF BREATH: 0
APPETITE CHANGE: 0

## 2024-10-25 NOTE — PATIENT INSTRUCTIONS
Type 2 diabetes mellitus, is at goal. A1C 5.8%    RX changes:   Increase Ozempic 1 mg weekly.   Education:  interpretation of lab results, blood sugar goals, and complications of diabetes mellitus  BMI 30: Exercise: Move every day with goal of going to gym 5 days a week. Continue meal planning.   Hyperlipidemia: At goal. No changes  Hypertension: At goal. No changes.   Follow up: I recommend diabetes care be 3-4 months

## 2024-10-25 NOTE — PROGRESS NOTES
"Sisi Cool is a 53 y.o. female who presents for follow up visit for evaluation of Type 2 diabetes mellitus. The initial diagnosis of diabetes was made  April 2023 .     Her bother passed in May 2024 at age 53 of a heart attack.     Known complications due to diabetes included none    Cardiovascular risk factors include diabetes mellitus. The patient is not on an ACE inhibitor or angiotensin II receptor blocker.      Current diabetes regimen is as follows:   Ozempic 0.5 mg weekly    The patient is currently checking the blood glucose 1 times per day.  Patient is using: glucometer  Sytates she is running less that 116 daily. Using [employer] yolis    Hypoglycemia frequency: 0%  Hypoglycemia awareness: Yes     Exercise: daily   Meal panning: She is using avoidance of concentrated sweets.    Review of Systems   Constitutional:  Negative for activity change, appetite change and fatigue.   Respiratory:  Negative for shortness of breath.    Cardiovascular:  Negative for chest pain, palpitations and leg swelling.   Gastrointestinal:  Negative for constipation, diarrhea and nausea.   Endocrine: Negative for cold intolerance, heat intolerance, polydipsia, polyphagia and polyuria.   Genitourinary:  Negative for frequency.   Musculoskeletal:  Negative for gait problem.   Skin:  Negative for rash.   Neurological:  Negative for dizziness, seizures, weakness and numbness.   Psychiatric/Behavioral:  Negative for sleep disturbance and suicidal ideas. The patient is not nervous/anxious.        Objective   /67 (BP Location: Right arm, Patient Position: Sitting, BP Cuff Size: Adult)   Pulse 93   Temp 36.8 °C (98.2 °F) (Tympanic)   Resp 20   Ht 1.575 m (5' 2\")   Wt 74.4 kg (164 lb)   LMP 10/21/2024   BMI 30.00 kg/m²   Physical Exam  Pulmonary:      Effort: Pulmonary effort is normal.   Skin:     General: Skin is warm and dry.   Neurological:      Mental Status: She is alert.   Psychiatric:         Mood and " Affect: Mood normal.         Behavior: Behavior normal.         Thought Content: Thought content normal.         Judgment: Judgment normal.         Lab Review  Glucose   Date Value   10/24/2024 109 mg/dL (H)   06/05/2024 93 mg/dL   07/01/2023 95 mg/dL   03/19/2023 CANCELED   03/18/2023 156 mg/dL (H)     POC HEMOGLOBIN A1c (%)   Date Value   03/29/2024 5.7   11/21/2023 6.2   06/12/2023 6.0     Hemoglobin A1C (%)   Date Value   07/01/2023 5.9 (A)     Bicarbonate   Date Value   10/24/2024 27 mmol/L   06/05/2024 28 mmol/L   07/01/2023 26 mmol/L   03/19/2023 CANCELED   03/18/2023 27 mmol/L     Urea Nitrogen   Date Value   10/24/2024 14 mg/dL   06/05/2024 13 mg/dL   07/01/2023 16 mg/dL   03/19/2023 CANCELED   03/18/2023 14 mg/dL     Creatinine   Date Value   10/24/2024 0.52 mg/dL   06/05/2024 0.54 mg/dL   07/01/2023 0.57 mg/dL   03/19/2023 CANCELED   03/18/2023 0.57 mg/dL       Health Maintenance:   Foot Exam: None  Eye Exam: may 2023, no retinopathy  Lipid Panel: Total 161 LDL 73 October 2024  Urine Albumin: < 7 April 2023. Primary care has ordered update labs.     Assessment/Plan   Diagnoses and all orders for this visit:  Type 2 diabetes mellitus without complication, without long-term current use of insulin (Multi)  Hyperlipidemia due to type 2 diabetes mellitus (Multi)  Hypertension, unspecified type  Class 1 obesity due to excess calories with serious comorbidity and body mass index (BMI) of 30.0 to 30.9 in adult    Type 2 diabetes mellitus, is at goal. A1C 5.8%    RX changes:   Increase Ozempic 1 mg weekly.   Education:  interpretation of lab results, blood sugar goals, and complications of diabetes mellitus  BMI 30: Exercise: Move every day with goal of going to gym 5 days a week. Continue meal planning.   Hyperlipidemia: At goal. No changes  Hypertension: At goal. No changes.   Follow up: I recommend diabetes care be 3-4 months

## 2024-10-29 ENCOUNTER — PHARMACY VISIT (OUTPATIENT)
Dept: PHARMACY | Facility: CLINIC | Age: 54
End: 2024-10-29
Payer: COMMERCIAL

## 2024-11-06 DIAGNOSIS — Z12.11 SPECIAL SCREENING FOR MALIGNANT NEOPLASMS, COLON: ICD-10-CM

## 2024-11-06 RX ORDER — SODIUM, POTASSIUM,MAG SULFATES 17.5-3.13G
SOLUTION, RECONSTITUTED, ORAL ORAL
Qty: 354 ML | Refills: 0 | Status: SHIPPED | OUTPATIENT
Start: 2024-11-06

## 2024-11-07 ENCOUNTER — PHARMACY VISIT (OUTPATIENT)
Dept: PHARMACY | Facility: CLINIC | Age: 54
End: 2024-11-07
Payer: COMMERCIAL

## 2024-11-07 PROCEDURE — RXMED WILLOW AMBULATORY MEDICATION CHARGE

## 2024-11-07 PROCEDURE — RXOTC WILLOW AMBULATORY OTC CHARGE

## 2024-11-11 ENCOUNTER — HOSPITAL ENCOUNTER (OUTPATIENT)
Dept: RADIOLOGY | Facility: HOSPITAL | Age: 54
Discharge: HOME | End: 2024-11-11
Payer: COMMERCIAL

## 2024-11-11 ENCOUNTER — OFFICE VISIT (OUTPATIENT)
Dept: ORTHOPEDIC SURGERY | Facility: HOSPITAL | Age: 54
End: 2024-11-11
Payer: COMMERCIAL

## 2024-11-11 VITALS — HEIGHT: 62 IN | BODY MASS INDEX: 30.18 KG/M2 | WEIGHT: 164 LBS

## 2024-11-11 DIAGNOSIS — M67.52 SYNOVIAL PLICA SYNDROME OF LEFT KNEE: ICD-10-CM

## 2024-11-11 DIAGNOSIS — M22.42 CHONDROMALACIA OF LEFT PATELLA: ICD-10-CM

## 2024-11-11 DIAGNOSIS — M22.2X2 PATELLOFEMORAL SYNDROME OF LEFT KNEE: ICD-10-CM

## 2024-11-11 DIAGNOSIS — M22.42 CHONDROMALACIA OF LEFT PATELLA: Primary | ICD-10-CM

## 2024-11-11 PROCEDURE — 3048F LDL-C <100 MG/DL: CPT | Performed by: PHYSICIAN ASSISTANT

## 2024-11-11 PROCEDURE — 73562 X-RAY EXAM OF KNEE 3: CPT | Mod: LT

## 2024-11-11 PROCEDURE — 99213 OFFICE O/P EST LOW 20 MIN: CPT | Performed by: PHYSICIAN ASSISTANT

## 2024-11-11 PROCEDURE — 73562 X-RAY EXAM OF KNEE 3: CPT | Mod: LEFT SIDE | Performed by: RADIOLOGY

## 2024-11-11 PROCEDURE — 99214 OFFICE O/P EST MOD 30 MIN: CPT | Performed by: PHYSICIAN ASSISTANT

## 2024-11-11 PROCEDURE — 3008F BODY MASS INDEX DOCD: CPT | Performed by: PHYSICIAN ASSISTANT

## 2024-11-11 ASSESSMENT — PAIN SCALES - GENERAL: PAINLEVEL_OUTOF10: 8

## 2024-11-11 ASSESSMENT — PAIN - FUNCTIONAL ASSESSMENT: PAIN_FUNCTIONAL_ASSESSMENT: 0-10

## 2024-11-12 ENCOUNTER — APPOINTMENT (OUTPATIENT)
Dept: GASTROENTEROLOGY | Facility: EXTERNAL LOCATION | Age: 54
End: 2024-11-12
Payer: COMMERCIAL

## 2024-11-12 DIAGNOSIS — D12.5 BENIGN NEOPLASM OF SIGMOID COLON: Primary | ICD-10-CM

## 2024-11-12 DIAGNOSIS — Z12.11 ENCOUNTER FOR SCREENING FOR MALIGNANT NEOPLASM OF COLON: ICD-10-CM

## 2024-11-12 DIAGNOSIS — D12.3 BENIGN NEOPLASM OF TRANSVERSE COLON: ICD-10-CM

## 2024-11-12 PROCEDURE — 3048F LDL-C <100 MG/DL: CPT | Performed by: INTERNAL MEDICINE

## 2024-11-12 PROCEDURE — 45385 COLONOSCOPY W/LESION REMOVAL: CPT | Performed by: INTERNAL MEDICINE

## 2024-11-12 PROCEDURE — 88305 TISSUE EXAM BY PATHOLOGIST: CPT | Performed by: STUDENT IN AN ORGANIZED HEALTH CARE EDUCATION/TRAINING PROGRAM

## 2024-11-12 PROCEDURE — 88305 TISSUE EXAM BY PATHOLOGIST: CPT

## 2024-11-12 PROCEDURE — 45380 COLONOSCOPY AND BIOPSY: CPT | Performed by: INTERNAL MEDICINE

## 2024-11-13 ENCOUNTER — LAB REQUISITION (OUTPATIENT)
Dept: LAB | Facility: HOSPITAL | Age: 54
End: 2024-11-13
Payer: COMMERCIAL

## 2024-11-14 NOTE — PROGRESS NOTES
History of Present Illness  53 y.o.female presents at same day walk in clinic for left knee pain  1. Chondromalacia of left patella  XR knee left 3 views    Referral to Physical Therapy      2. Synovial plica syndrome of left knee  Referral to Physical Therapy      3. Patellofemoral syndrome of left knee  Referral to Physical Therapy        Mechanism of injury: none; hip hop work out class  Date of Injury/Pain: 11/2/24  Location of pain: anterior knee  Frequency of Pain: worse with walking or bending  Associated symptoms? Swelling.  Previous treatment?  NSAIDs, ice   They deny any locking of the knee    27 point review of systems negative except what is stated in HPI     Constitutional Exam: patient's height and weight reviewed, well-kempt  Psychiatric Exam: alert and oriented x 3, appropriate mood and behavior  Eye Exam: GREGORIAKEITH  Pulmonary Exam: breathing non-labored, no apparent distress  Lymphatic exam: no appreciable lymphadenopathy in the lower extremities  Cardiovascular exam: DP pulses 2+ bilaterally, PT 2+ bilaterally, toes are pink with good capillary refill, no pitting edema  Skin exam: no open lesions, rashes, abrasions or ulcerations  Neurological exam: sensation to light touch intact in both lower extremities in peripheral and dermatomal distributions (except for any abnormalities noted in musculoskeletal exam)        PHYSICAL EXAM  On examination of the left knee:  Normal gait, neutral alignment  Minimal swelling; No effusion bruising or atrophy.  Neutral alignment.    Normal range of motion in extension and flexion.   Normal strength in flexion and extension.  No extensor lag.    Tenderness to palpation: medial plica  No tenderness to palpation over the lateral joint line, tibial plateau, femoral condyles, quadriceps tendon, patellar tendon, patella, MCL or LCL.    Neurovascularly intact.  Normal sensation to light touch.  Popliteal, dorsalis pedis and posterior tibial pulses 2+ bilaterally.     Negative Zari's test.   Negative anterior drawer test.   Negative posterior drawer test.    Negative Lachman's.   Negative valgus stress test at 0 and 30° flexion.   Negative varus stress test at 0 and 30° flexion.   1-1 medial/lateral in 30 degrees flexion, 2-1 medial/lateral at  0 degrees with patellar glide test.   Positive patellar grind test.     DATA/RESULTS  I personally reviewed the patient's x-ray images and reports of the left knee. The xrays show no fractures or dislocation.  Normal joint spacing      ASSESSMENT: left knee patellofemoral syndrome, plica syndrome    PLAN: Treatment options were discussed with the patient. The patient was given a prescription for physical therapy.  Physical therapy is medically necessary to improve strength, balance, range of motion and functional outcomes after injury and/or surgery. Patient should avoid deep flexion of the knee including kneeling, squatting or sitting in low chairs.  They should also avoid impact activities such as running and jumping but can use a stationary bike, pool exercises and upper body training. Patient was given a handout and instructed on an at home stretching program.  They should do these exercises 3 times per day for 6 weeks and then daily. Patient can use OTC aspercream for pain and continue to ice and elevate supported at the calf to reduce swelling. All the patient's questions were answered. The patient agrees with the above plan.  Follow up as needed

## 2024-11-18 ENCOUNTER — TELEPHONE (OUTPATIENT)
Dept: PULMONOLOGY | Facility: HOSPITAL | Age: 54
End: 2024-11-18
Payer: COMMERCIAL

## 2024-11-18 DIAGNOSIS — J45.41 MODERATE PERSISTENT ASTHMATIC BRONCHITIS WITH ACUTE EXACERBATION (HHS-HCC): ICD-10-CM

## 2024-11-18 DIAGNOSIS — R05.3 PERSISTENT COUGH: ICD-10-CM

## 2024-11-18 PROCEDURE — RXMED WILLOW AMBULATORY MEDICATION CHARGE

## 2024-11-18 NOTE — TELEPHONE ENCOUNTER
Pt reached out via [employer] message regarding the need of a FUV with Dr. Mcclendon. Tried to call pt at [phone] regarding this matter, but was unsuccessful. Voicemail left with instructions to return the call at [phone].

## 2024-11-20 ENCOUNTER — PHARMACY VISIT (OUTPATIENT)
Dept: PHARMACY | Facility: CLINIC | Age: 54
End: 2024-11-20
Payer: COMMERCIAL

## 2024-11-20 PROCEDURE — RXMED WILLOW AMBULATORY MEDICATION CHARGE

## 2024-11-20 RX ORDER — MONTELUKAST SODIUM 10 MG/1
10 TABLET ORAL NIGHTLY
Qty: 90 TABLET | Refills: 3 | Status: SHIPPED | OUTPATIENT
Start: 2024-11-20

## 2024-11-22 LAB
LABORATORY COMMENT REPORT: NORMAL
PATH REPORT.FINAL DX SPEC: NORMAL
PATH REPORT.GROSS SPEC: NORMAL
PATH REPORT.RELEVANT HX SPEC: NORMAL
PATH REPORT.TOTAL CANCER: NORMAL
RESIDENT REVIEW: NORMAL

## 2024-11-25 PROCEDURE — RXMED WILLOW AMBULATORY MEDICATION CHARGE

## 2024-11-26 ENCOUNTER — PHARMACY VISIT (OUTPATIENT)
Dept: PHARMACY | Facility: CLINIC | Age: 54
End: 2024-11-26
Payer: COMMERCIAL

## 2024-11-27 ENCOUNTER — PATIENT MESSAGE (OUTPATIENT)
Dept: ENDOCRINOLOGY | Facility: CLINIC | Age: 54
End: 2024-11-27

## 2024-11-27 ENCOUNTER — PHARMACY VISIT (OUTPATIENT)
Dept: PHARMACY | Facility: CLINIC | Age: 54
End: 2024-11-27
Payer: COMMERCIAL

## 2024-11-27 ENCOUNTER — HOSPITAL ENCOUNTER (EMERGENCY)
Facility: HOSPITAL | Age: 54
Discharge: HOME | End: 2024-11-27
Attending: EMERGENCY MEDICINE
Payer: COMMERCIAL

## 2024-11-27 ENCOUNTER — APPOINTMENT (OUTPATIENT)
Dept: RADIOLOGY | Facility: HOSPITAL | Age: 54
End: 2024-11-27
Payer: COMMERCIAL

## 2024-11-27 VITALS
OXYGEN SATURATION: 95 % | SYSTOLIC BLOOD PRESSURE: 133 MMHG | WEIGHT: 159 LBS | TEMPERATURE: 97.8 F | HEIGHT: 61 IN | HEART RATE: 81 BPM | RESPIRATION RATE: 18 BRPM | BODY MASS INDEX: 30.02 KG/M2 | DIASTOLIC BLOOD PRESSURE: 81 MMHG

## 2024-11-27 DIAGNOSIS — R10.84 GENERALIZED ABDOMINAL PAIN: Primary | ICD-10-CM

## 2024-11-27 DIAGNOSIS — E11.65 UNCONTROLLED TYPE 2 DIABETES MELLITUS WITH HYPERGLYCEMIA, WITHOUT LONG-TERM CURRENT USE OF INSULIN: Primary | ICD-10-CM

## 2024-11-27 DIAGNOSIS — K57.90 DIVERTICULOSIS: ICD-10-CM

## 2024-11-27 DIAGNOSIS — K44.9 HIATAL HERNIA: ICD-10-CM

## 2024-11-27 DIAGNOSIS — Z86.018 HISTORY OF UTERINE FIBROID: ICD-10-CM

## 2024-11-27 DIAGNOSIS — J18.9 PNEUMONIA DUE TO INFECTIOUS ORGANISM, UNSPECIFIED LATERALITY, UNSPECIFIED PART OF LUNG: ICD-10-CM

## 2024-11-27 LAB
ALBUMIN SERPL BCP-MCNC: 4.9 G/DL (ref 3.4–5)
ALP SERPL-CCNC: 265 U/L (ref 33–110)
ALT SERPL W P-5'-P-CCNC: 76 U/L (ref 7–45)
ANION GAP SERPL CALC-SCNC: 12 MMOL/L (ref 10–20)
APPEARANCE UR: CLEAR
AST SERPL W P-5'-P-CCNC: 56 U/L (ref 9–39)
BASOPHILS # BLD AUTO: 0.02 X10*3/UL (ref 0–0.1)
BASOPHILS NFR BLD AUTO: 0.3 %
BILIRUB SERPL-MCNC: 0.6 MG/DL (ref 0–1.2)
BILIRUB UR STRIP.AUTO-MCNC: NEGATIVE MG/DL
BUN SERPL-MCNC: 12 MG/DL (ref 6–23)
CALCIUM SERPL-MCNC: 10.2 MG/DL (ref 8.6–10.3)
CHLORIDE SERPL-SCNC: 103 MMOL/L (ref 98–107)
CO2 SERPL-SCNC: 28 MMOL/L (ref 21–32)
COLOR UR: NORMAL
CREAT SERPL-MCNC: 0.59 MG/DL (ref 0.5–1.05)
EGFRCR SERPLBLD CKD-EPI 2021: >90 ML/MIN/1.73M*2
EOSINOPHIL # BLD AUTO: 0.14 X10*3/UL (ref 0–0.7)
EOSINOPHIL NFR BLD AUTO: 1.9 %
ERYTHROCYTE [DISTWIDTH] IN BLOOD BY AUTOMATED COUNT: 13.2 % (ref 11.5–14.5)
GLUCOSE SERPL-MCNC: 85 MG/DL (ref 74–99)
GLUCOSE UR STRIP.AUTO-MCNC: NORMAL MG/DL
HCG UR QL IA.RAPID: NEGATIVE
HCT VFR BLD AUTO: 43.4 % (ref 36–46)
HGB BLD-MCNC: 14.8 G/DL (ref 12–16)
IMM GRANULOCYTES # BLD AUTO: 0.03 X10*3/UL (ref 0–0.7)
IMM GRANULOCYTES NFR BLD AUTO: 0.4 % (ref 0–0.9)
KETONES UR STRIP.AUTO-MCNC: NEGATIVE MG/DL
LACTATE SERPL-SCNC: 1 MMOL/L (ref 0.4–2)
LEUKOCYTE ESTERASE UR QL STRIP.AUTO: NEGATIVE
LIPASE SERPL-CCNC: 47 U/L (ref 9–82)
LYMPHOCYTES # BLD AUTO: 1.09 X10*3/UL (ref 1.2–4.8)
LYMPHOCYTES NFR BLD AUTO: 14.8 %
MCH RBC QN AUTO: 29.4 PG (ref 26–34)
MCHC RBC AUTO-ENTMCNC: 34.1 G/DL (ref 32–36)
MCV RBC AUTO: 86 FL (ref 80–100)
MONOCYTES # BLD AUTO: 0.62 X10*3/UL (ref 0.1–1)
MONOCYTES NFR BLD AUTO: 8.4 %
NEUTROPHILS # BLD AUTO: 5.46 X10*3/UL (ref 1.2–7.7)
NEUTROPHILS NFR BLD AUTO: 74.2 %
NITRITE UR QL STRIP.AUTO: NEGATIVE
NRBC BLD-RTO: 0 /100 WBCS (ref 0–0)
PH UR STRIP.AUTO: 5.5 [PH]
PLATELET # BLD AUTO: 229 X10*3/UL (ref 150–450)
POTASSIUM SERPL-SCNC: 4 MMOL/L (ref 3.5–5.3)
PROT SERPL-MCNC: 8.4 G/DL (ref 6.4–8.2)
PROT UR STRIP.AUTO-MCNC: NEGATIVE MG/DL
RBC # BLD AUTO: 5.04 X10*6/UL (ref 4–5.2)
RBC # UR STRIP.AUTO: NEGATIVE /UL
SARS-COV-2 RNA RESP QL NAA+PROBE: NOT DETECTED
SODIUM SERPL-SCNC: 139 MMOL/L (ref 136–145)
SP GR UR STRIP.AUTO: 1.01
UROBILINOGEN UR STRIP.AUTO-MCNC: NORMAL MG/DL
WBC # BLD AUTO: 7.4 X10*3/UL (ref 4.4–11.3)

## 2024-11-27 PROCEDURE — 2500000001 HC RX 250 WO HCPCS SELF ADMINISTERED DRUGS (ALT 637 FOR MEDICARE OP): Performed by: EMERGENCY MEDICINE

## 2024-11-27 PROCEDURE — 2550000001 HC RX 255 CONTRASTS: Performed by: EMERGENCY MEDICINE

## 2024-11-27 PROCEDURE — 81003 URINALYSIS AUTO W/O SCOPE: CPT | Performed by: EMERGENCY MEDICINE

## 2024-11-27 PROCEDURE — 99284 EMERGENCY DEPT VISIT MOD MDM: CPT | Mod: 25

## 2024-11-27 PROCEDURE — 74177 CT ABD & PELVIS W/CONTRAST: CPT

## 2024-11-27 PROCEDURE — 83605 ASSAY OF LACTIC ACID: CPT | Performed by: EMERGENCY MEDICINE

## 2024-11-27 PROCEDURE — 81025 URINE PREGNANCY TEST: CPT | Performed by: EMERGENCY MEDICINE

## 2024-11-27 PROCEDURE — 83690 ASSAY OF LIPASE: CPT | Performed by: EMERGENCY MEDICINE

## 2024-11-27 PROCEDURE — RXMED WILLOW AMBULATORY MEDICATION CHARGE

## 2024-11-27 PROCEDURE — 87635 SARS-COV-2 COVID-19 AMP PRB: CPT | Performed by: EMERGENCY MEDICINE

## 2024-11-27 PROCEDURE — 85025 COMPLETE CBC W/AUTO DIFF WBC: CPT | Performed by: EMERGENCY MEDICINE

## 2024-11-27 PROCEDURE — 80053 COMPREHEN METABOLIC PANEL: CPT | Performed by: EMERGENCY MEDICINE

## 2024-11-27 PROCEDURE — 36415 COLL VENOUS BLD VENIPUNCTURE: CPT | Performed by: EMERGENCY MEDICINE

## 2024-11-27 PROCEDURE — 74177 CT ABD & PELVIS W/CONTRAST: CPT | Mod: FOREIGN READ | Performed by: RADIOLOGY

## 2024-11-27 RX ORDER — ONDANSETRON 4 MG/1
4 TABLET, ORALLY DISINTEGRATING ORAL EVERY 8 HOURS PRN
Qty: 9 TABLET | Refills: 0 | Status: SHIPPED | OUTPATIENT
Start: 2024-11-27 | End: 2024-11-30

## 2024-11-27 RX ORDER — DOXYCYCLINE HYCLATE 100 MG
100 TABLET ORAL ONCE
Status: COMPLETED | OUTPATIENT
Start: 2024-11-27 | End: 2024-11-27

## 2024-11-27 RX ORDER — DOXYCYCLINE 100 MG/1
100 TABLET ORAL 2 TIMES DAILY
Qty: 10 TABLET | Refills: 0 | Status: SHIPPED | OUTPATIENT
Start: 2024-11-27 | End: 2024-12-02

## 2024-11-27 ASSESSMENT — PAIN SCALES - GENERAL: PAINLEVEL_OUTOF10: 8

## 2024-11-27 ASSESSMENT — PAIN DESCRIPTION - ORIENTATION: ORIENTATION: MID

## 2024-11-27 ASSESSMENT — PAIN DESCRIPTION - ONSET: ONSET: SUDDEN

## 2024-11-27 ASSESSMENT — COLUMBIA-SUICIDE SEVERITY RATING SCALE - C-SSRS
6. HAVE YOU EVER DONE ANYTHING, STARTED TO DO ANYTHING, OR PREPARED TO DO ANYTHING TO END YOUR LIFE?: NO
2. HAVE YOU ACTUALLY HAD ANY THOUGHTS OF KILLING YOURSELF?: NO
1. IN THE PAST MONTH, HAVE YOU WISHED YOU WERE DEAD OR WISHED YOU COULD GO TO SLEEP AND NOT WAKE UP?: NO

## 2024-11-27 ASSESSMENT — PAIN DESCRIPTION - LOCATION: LOCATION: ABDOMEN

## 2024-11-27 ASSESSMENT — PAIN DESCRIPTION - DESCRIPTORS
DESCRIPTORS: ACHING
DESCRIPTORS: ACHING

## 2024-11-27 ASSESSMENT — PAIN DESCRIPTION - FREQUENCY: FREQUENCY: CONSTANT/CONTINUOUS

## 2024-11-27 ASSESSMENT — PAIN DESCRIPTION - PAIN TYPE: TYPE: ACUTE PAIN

## 2024-11-27 ASSESSMENT — PAIN - FUNCTIONAL ASSESSMENT: PAIN_FUNCTIONAL_ASSESSMENT: 0-10

## 2024-11-27 ASSESSMENT — PAIN DESCRIPTION - PROGRESSION: CLINICAL_PROGRESSION: NOT CHANGED

## 2024-11-27 NOTE — ED TRIAGE NOTES
Patient presents to ED from work with N/V and abdominal pain that started this morning. Patient states she did have a fever last night, but none today.

## 2024-11-27 NOTE — ED PROVIDER NOTES
"HPI   Chief Complaint   Patient presents with    Abdominal Pain    Nausea    Vomiting       HPI  Patient is a alicia 53-year-old female with past medical history significant for type 2 diabetes, asthma, GERD who presented to the emergency room with a chief complaint of nausea vomiting and epigastric discomfort.  Patient states that the symptoms started overnight.  She had a little bit of fever last night but not today.  She took some Advil and her symptoms improved.  This morning she felt a little backed up so she took something called \"smooth groove\", Colace.  Last bowel movement was yesterday and it was nonbloody nonmelanotic.  She showed up to work and had a couple episodes of nonbloody nonbilious emesis.  They sent her for evaluation.  At present time she is not nauseous and declining antiemetics.  She is feeling some epigastric discomfort.  Of note, patient quit yanking few months ago and feels like she is doing well.      PMHx: As above  PSHx: Denies.  FamilyHx: Denies pertinent  SocialHx: Quit drinking alcohol  Allergies: Denies  Medications: See Medication Reconciliation but includes Ozempic    ROS  As above otherwise denies      Physical Exam    GENERAL: Awake and Alert, No Acute Distress  HEENT: AT/NC, PERRL, EOMI, Normal Oropharynx, No Signs of Dehydration  NECK: Normal Inspection, No JVD  CARDIOVASCULAR: RRR, No M/R/G  RESPIRATORY: CTA Bilaterally, No Wheezes, Rales or Rhonchi, Chest Wall Non-tender  ABDOMEN: Soft, tenderness to palpation in the epigastric region.  Negative Saab sign.  Normal Bowel Sounds, No Distention  BACK: No CVA Tenderness  SKIN: Normal Color, Warm, Dry, No Rashes   EXTREMITIES: Non-Tender, Full ROM, No Pedal Edema  NEURO: A&O x 3, Normal Motor and Sensation, Normal Mood and Affect    Nursing Assessment and Vitals Reviewed    Medical Decision  Patient is a alicia 53-year-old female with past medical history significant for type 2 diabetes, asthma, GERD who presented to the " "emergency room with a chief complaint of nausea vomiting and epigastric discomfort.  Patient states that the symptoms started overnight.  She had a little bit of fever last night but not today.  She took some Advil and her symptoms improved.  This morning she felt a little backed up so she took something called \"smooth groove\", Colace.  Last bowel movement was yesterday and it was nonbloody nonmelanotic.  She showed up to work and had a couple episodes of nonbloody nonbilious emesis.  They sent her for evaluation.  At present time she is not nauseous and declining antiemetics.  She is feeling some epigastric discomfort.  Of note, patient quit yanking few months ago and feels like she is doing well.    On evaluation patient tender as well patient in the epigastric region.  No peritoneal signs.  No right upper quadrant tenderness or Saab sign.    Workup for patient included labs that revealed a slight elevation in her liver function test which were previously elevated.  Lactate and lipase are within normal limits.  She has no leukocytosis or anemia.  Urinalysis is negative.  CT abdomen and pelvis showed findings concerning for possible pneumonia and patient was coughing in the emergency room although did not initially endorse it.  She does also have some enteritis, signs of diverticulosis and uterine fibroids.  All results were discussed with her.  She was started on antibiotics for presumed pneumonia.  She was also given antiemetic prescription.  She will follow-up with her primary care physician.  She remains in stable condition at this time and is educated on supportive care at home as well as signs and symptoms that should prompt an immediate turn to emergency room.                  Patient History   Past Medical History:   Diagnosis Date    Allergy to seafood 12/15/2022    History of allergy to shellfish    Candidiasis, unspecified 12/09/2022    Yeast infection    Encounter for contraceptive management, " unspecified 2021    Contraception management    Encounter for gynecological examination (general) (routine) without abnormal findings 2020    Women's annual routine gynecological examination    Essential (primary) hypertension 2019    HTN (hypertension), benign    Other specified noninflammatory disorders of vagina 2021    Vaginal irritation    Personal history of other diseases of the musculoskeletal system and connective tissue 2019    History of herniated intervertebral disc    Personal history of other diseases of the respiratory system     History of sinusitis    Personal history of other endocrine, nutritional and metabolic disease 2019    History of hypercholesterolemia    Personal history of other infectious and parasitic diseases 2021    History of trichomonal vaginitis    Personal history of other medical treatment 2021    History of screening mammography    Personal history of urinary (tract) infections     History of urinary tract infection     Past Surgical History:   Procedure Laterality Date    BREAST BIOPSY      OTHER SURGICAL HISTORY  2019     section     Family History   Problem Relation Name Age of Onset    No Known Problems Mother      Diabetes Other      Hypertension Other      Cancer Other       Social History     Tobacco Use    Smoking status: Former     Current packs/day: 0.00     Types: Cigarettes     Quit date:      Years since quittin.9    Smokeless tobacco: Never   Vaping Use    Vaping status: Never Used   Substance Use Topics    Alcohol use: Yes     Comment: social    Drug use: Never       Physical Exam   ED Triage Vitals [24 1159]   Temperature Heart Rate Respirations BP   36.6 °C (97.8 °F) 75 18 152/83      Pulse Ox Temp Source Heart Rate Source Patient Position   98 % Temporal Monitor Sitting      BP Location FiO2 (%)     Right arm --       Physical Exam      ED Course & MDM   Diagnoses as of 24 5128    Generalized abdominal pain   Diverticulosis   Pneumonia due to infectious organism, unspecified laterality, unspecified part of lung   Hiatal hernia   History of uterine fibroid                 No data recorded     Zelda Coma Scale Score: 15 (11/27/24 1201 : Yue Fernandez RN)                           Medical Decision Making      Procedure  Procedures     Ana Maria Osborne MD  11/27/24 9585

## 2024-11-27 NOTE — DISCHARGE INSTRUCTIONS
Below are the results of your CT imaging to follow-up with your doctor.  Take medications as indicated for length of time instructed.  Return immediately if concerning symptoms, as discussed.    IMPRESSION:  Basilar parenchymal opacities and parenchymal bands.  Please correlate  for component of pneumonia.  Enteritis.  Sigmoid colon diverticulosis without evidence of diverticulitis.  Small hiatal hernia.  Small hypodensity within the uterus suggests uterine fibroid.

## 2024-12-02 ENCOUNTER — TELEPHONE (OUTPATIENT)
Dept: PULMONOLOGY | Facility: HOSPITAL | Age: 54
End: 2024-12-02
Payer: COMMERCIAL

## 2024-12-02 ENCOUNTER — PHARMACY VISIT (OUTPATIENT)
Dept: PHARMACY | Facility: CLINIC | Age: 54
End: 2024-12-02
Payer: COMMERCIAL

## 2024-12-02 DIAGNOSIS — J45.41 MODERATE PERSISTENT ASTHMATIC BRONCHITIS WITH ACUTE EXACERBATION (HHS-HCC): ICD-10-CM

## 2024-12-02 DIAGNOSIS — J18.9 PNEUMONIA DUE TO INFECTIOUS ORGANISM, UNSPECIFIED LATERALITY, UNSPECIFIED PART OF LUNG: ICD-10-CM

## 2024-12-02 PROCEDURE — RXMED WILLOW AMBULATORY MEDICATION CHARGE

## 2024-12-02 RX ORDER — PREDNISONE 20 MG/1
20 TABLET ORAL DAILY
Qty: 5 TABLET | Refills: 0 | Status: SHIPPED | OUTPATIENT
Start: 2024-12-02 | End: 2024-12-07

## 2024-12-02 RX ORDER — SEMAGLUTIDE 0.68 MG/ML
0.5 INJECTION, SOLUTION SUBCUTANEOUS
Qty: 3 ML | Refills: 11 | Status: SHIPPED | OUTPATIENT
Start: 2024-12-02

## 2024-12-02 NOTE — TELEPHONE ENCOUNTER
Returned pt's call regarding her recent diagnosis of pneumonia. Pt called stating that she had a CT A/P on 11/27/24 for abdominal pain and it showed pneumonia. She stated that she was prescribed doxycycline x 5 days. She continues to have c/o occasional sob, wheezing, and cough with yellow/brown mucous. She denies fever and chills. She is hesitant about taking prednisone due to her diabetes, but will consider it if needed. Dr. Mcclendon notified.

## 2024-12-02 NOTE — TELEPHONE ENCOUNTER
Per Dr. Mcclendon, will prescribe prednisone 20 mg daily for 5 days. Dr. Mcclendon would also like a sputum sample and follow up visit with pt tomorrow, 12/3/24, @ 8 am @ [facility]. Spoke with pt and updated her on the plan of care. Pt stated that she will try to get a sputum sample and agreed to the visit tomorrow with Dr. Mcclendon. Orders placed and routed to Dr. Mcclendon to sign.

## 2024-12-03 ENCOUNTER — LAB (OUTPATIENT)
Dept: LAB | Facility: LAB | Age: 54
End: 2024-12-03
Payer: COMMERCIAL

## 2024-12-03 ENCOUNTER — APPOINTMENT (OUTPATIENT)
Dept: GASTROENTEROLOGY | Facility: HOSPITAL | Age: 54
End: 2024-12-03
Payer: COMMERCIAL

## 2024-12-03 ENCOUNTER — OFFICE VISIT (OUTPATIENT)
Dept: PULMONOLOGY | Facility: CLINIC | Age: 54
End: 2024-12-03
Payer: COMMERCIAL

## 2024-12-03 VITALS
WEIGHT: 159 LBS | HEART RATE: 75 BPM | SYSTOLIC BLOOD PRESSURE: 120 MMHG | BODY MASS INDEX: 30.02 KG/M2 | RESPIRATION RATE: 18 BRPM | DIASTOLIC BLOOD PRESSURE: 81 MMHG | HEIGHT: 61 IN | OXYGEN SATURATION: 97 %

## 2024-12-03 DIAGNOSIS — J45.41 MODERATE PERSISTENT ASTHMATIC BRONCHITIS WITH ACUTE EXACERBATION (HHS-HCC): ICD-10-CM

## 2024-12-03 DIAGNOSIS — J18.9 PNEUMONIA DUE TO INFECTIOUS ORGANISM, UNSPECIFIED LATERALITY, UNSPECIFIED PART OF LUNG: ICD-10-CM

## 2024-12-03 DIAGNOSIS — J30.9 ALLERGIC RHINITIS, UNSPECIFIED SEASONALITY, UNSPECIFIED TRIGGER: Primary | ICD-10-CM

## 2024-12-03 DIAGNOSIS — R05.3 PERSISTENT COUGH: ICD-10-CM

## 2024-12-03 DIAGNOSIS — J45.50 SEVERE PERSISTENT ASTHMA, UNSPECIFIED WHETHER COMPLICATED (MULTI): ICD-10-CM

## 2024-12-03 LAB
BACTERIA SPEC RESP CULT: ABNORMAL
GRAM STN SPEC: ABNORMAL

## 2024-12-03 PROCEDURE — 99214 OFFICE O/P EST MOD 30 MIN: CPT | Performed by: INTERNAL MEDICINE

## 2024-12-03 PROCEDURE — G2211 COMPLEX E/M VISIT ADD ON: HCPCS | Performed by: INTERNAL MEDICINE

## 2024-12-03 PROCEDURE — 3048F LDL-C <100 MG/DL: CPT | Performed by: INTERNAL MEDICINE

## 2024-12-03 PROCEDURE — 3074F SYST BP LT 130 MM HG: CPT | Performed by: INTERNAL MEDICINE

## 2024-12-03 PROCEDURE — 3008F BODY MASS INDEX DOCD: CPT | Performed by: INTERNAL MEDICINE

## 2024-12-03 PROCEDURE — 87205 SMEAR GRAM STAIN: CPT

## 2024-12-03 PROCEDURE — 1036F TOBACCO NON-USER: CPT | Performed by: INTERNAL MEDICINE

## 2024-12-03 PROCEDURE — 3079F DIAST BP 80-89 MM HG: CPT | Performed by: INTERNAL MEDICINE

## 2024-12-03 RX ORDER — FLUTICASONE PROPIONATE AND SALMETEROL 500; 50 UG/1; UG/1
1 POWDER RESPIRATORY (INHALATION)
Qty: 60 EACH | Refills: 3 | Status: SHIPPED | OUTPATIENT
Start: 2024-12-03

## 2024-12-03 RX ORDER — TIOTROPIUM BROMIDE INHALATION SPRAY 3.12 UG/1
2 SPRAY, METERED RESPIRATORY (INHALATION) DAILY
Qty: 4 G | Refills: 3 | Status: SHIPPED | OUTPATIENT
Start: 2024-12-03

## 2024-12-03 ASSESSMENT — PAIN SCALES - GENERAL: PAINLEVEL_OUTOF10: 0-NO PAIN

## 2024-12-03 NOTE — PATIENT INSTRUCTIONS
Dear Edmond Cool It was nice seeing you today. We discussed the following:   We will start a new inhaler called Spiriva to take daily   Consider starting Tezpire injection for asthma   Allergy referral   Prednisone 20 for 5 days  sputum culture     For scheduling purposes:    Call [phone] to schedule a breathing or a walking test     Call [phone] to schedule  EKG's, Echocardiograms and Cardiopulmonary Stress Tests.    Call [phone] to schedule Radiology tests such as Nuclear Medicine Stress Tests, CT Scans, and MRI's.    Should you have any questions Please Call my assistant Sally Clayton at [phone] or our pulmonary nurse Dariela Wen [phone].

## 2024-12-11 ENCOUNTER — APPOINTMENT (OUTPATIENT)
Dept: PULMONOLOGY | Facility: CLINIC | Age: 54
End: 2024-12-11
Payer: COMMERCIAL

## 2024-12-11 ENCOUNTER — OFFICE VISIT (OUTPATIENT)
Dept: PULMONOLOGY | Facility: CLINIC | Age: 54
End: 2024-12-11
Payer: COMMERCIAL

## 2024-12-11 VITALS
DIASTOLIC BLOOD PRESSURE: 82 MMHG | TEMPERATURE: 97.7 F | OXYGEN SATURATION: 97 % | HEIGHT: 61 IN | RESPIRATION RATE: 18 BRPM | WEIGHT: 159 LBS | SYSTOLIC BLOOD PRESSURE: 127 MMHG | HEART RATE: 76 BPM | BODY MASS INDEX: 30.02 KG/M2

## 2024-12-11 DIAGNOSIS — J45.50 SEVERE PERSISTENT ASTHMA, UNSPECIFIED WHETHER COMPLICATED (MULTI): Primary | ICD-10-CM

## 2024-12-11 PROCEDURE — 99214 OFFICE O/P EST MOD 30 MIN: CPT | Performed by: INTERNAL MEDICINE

## 2024-12-11 PROCEDURE — 1036F TOBACCO NON-USER: CPT | Performed by: INTERNAL MEDICINE

## 2024-12-11 PROCEDURE — 3074F SYST BP LT 130 MM HG: CPT | Performed by: INTERNAL MEDICINE

## 2024-12-11 PROCEDURE — 3048F LDL-C <100 MG/DL: CPT | Performed by: INTERNAL MEDICINE

## 2024-12-11 PROCEDURE — 3079F DIAST BP 80-89 MM HG: CPT | Performed by: INTERNAL MEDICINE

## 2024-12-11 PROCEDURE — 3008F BODY MASS INDEX DOCD: CPT | Performed by: INTERNAL MEDICINE

## 2024-12-11 RX ORDER — TIOTROPIUM BROMIDE INHALATION SPRAY 3.12 UG/1
2 SPRAY, METERED RESPIRATORY (INHALATION) DAILY
Qty: 4 G | Refills: 3 | Status: SHIPPED | OUTPATIENT
Start: 2024-12-11

## 2024-12-11 RX ORDER — FLUTICASONE PROPIONATE AND SALMETEROL 500; 50 UG/1; UG/1
1 POWDER RESPIRATORY (INHALATION)
Qty: 60 EACH | Refills: 3 | Status: SHIPPED | OUTPATIENT
Start: 2024-12-11

## 2024-12-11 ASSESSMENT — ASTHMA QUESTIONNAIRES
ACT_TOTALSCORE: 10
QUESTION_4 LAST FOUR WEEKS HOW OFTEN HAVE YOU USED YOUR RESCUE INHALER OR NEBULIZER MEDICATION (SUCH AS ALBUTEROL): 3 OR MORE TIMES PER DAY
QUESTION_2 LAST FOUR WEEKS HOW OFTEN HAVE YOU HAD SHORTNESS OF BREATH: ONCE A DAY
QUESTION_1 LAST FOUR WEEKS HOW MUCH OF THE TIME DID YOUR ASTHMA KEEP YOU FROM GETTING AS MUCH DONE AT WORK, SCHOOL OR AT HOME: MOST OF THE TIME
QUESTION_3 LAST FOUR WEEKS HOW OFTEN DID YOUR ASTHMA SYMPTOMS (WHEEZING, COUGHING, SHORTNESS OF BREATH, CHEST TIGHTNESS OR PAIN) WAKE YOU UP AT NIGHT OR EARLIER THAN USUAL IN THE MORNING: 2-3 NIGHTS A WEEK
QUESTION_5 LAST FOUR WEEKS HOW WOULD YOU RATE YOUR ASTHMA CONTROL: SOMEWHAT CONTROLLED

## 2024-12-11 ASSESSMENT — PAIN SCALES - GENERAL: PAINLEVEL_OUTOF10: 0-NO PAIN

## 2024-12-11 NOTE — LETTER
Date: 2024  RE:  Edmond Cool  :  1970      To Whom It May Concern:    Our patient, Edmond, has been under our care and now may return back to work without restrictions.    Their return to work date is:  12/10/2024    If you have questions concerning this patient's immediate care, please feel free to contact our office at [phone]    Sincerely,      Kerrie Schuler RN  Supervising Provider: Luis Mcclendon MD

## 2024-12-11 NOTE — PATIENT INSTRUCTIONS
Dear Edmond Cool It was nice seeing you today. We discussed the following:   You are doing a lot better and your lungs are clear to auscultation.  Continue Advair 500/50  Please check about obtaining Spiriva to start daily  Continue Singulair  We will start Tezpire injection for asthma  Allergy referral   RTC in 3 months      For scheduling purposes:    Call [phone] to schedule a breathing or a walking test     Call [phone] to schedule  EKG's, Echocardiograms and Cardiopulmonary Stress Tests.    Call [phone] to schedule Radiology tests such as Nuclear Medicine Stress Tests, CT Scans, and MRI's.    Should you have any questions Please Call my assistant Sally Clayton at [phone] or our pulmonary nurse Dariela Wen [phone].

## 2024-12-11 NOTE — PROGRESS NOTES
"Asthma severe persistent   Interval 12/11/2024    She is doing a lot better.  She finished the 5 days of prednisone.  She continues to take all her other asthma regimen.  ACT 10   PE: /82   Pulse 76   Temp 36.5 °C (97.7 °F)   Resp 18   Ht 1.549 m (5' 1\")   Wt 72.1 kg (159 lb)   SpO2 97%   BMI 30.04 kg/m²   Lungs:CTA       Interval 12/3/2024  Stopped the Fasenra that was started in June 2024 after 4 weeks or so because of lack of benefit.  She did not have a problem the whole summer.  Around Labor Day she went to [place] to visit her son and it was cold there.  She went to emergency room and was diagnosed with an asthma exacerbation prompting prednisone 20 mg for 5 days.  At the end of November she had an episode of recurrent vomiting prompting an emergency room visit.  An abdomen CT showed enteritis.  She received supportive care and recovered.  At the same time it showed infiltrate in the right middle lobe that were present on her old chest CT but given her persistent cough she was diagnosed with pneumonia and given a course of doxycycline.  She continues to cough dark brown phlegm for the last few months.  She is currently using the Advair 500/50.  Unfortunately the Spiriva was not covered by her insurance. Also use the Singulair. No biologics     I reviewed the emergency visit notes and the testing performed.  She was seen in the emergency room on 1127 for abdominal pain.  A CT was completed.  It showed some of the infiltrates that were previously seen on her chest CTs in the right middle lobe.  She was suspected to have pneumonia and was given a course of antibiotic/doxycycline.  She continues to be wheezy and complaining of a productive cough.  We started her on prednisone 20 mg yesterday.  MMRC 3, ACT not completed   PE:  /81   Pulse 75   Resp 18   Ht 1.549 m (5' 1\")   Wt 72.1 kg (159 lb)   SpO2 97%   BMI 30.04 kg/m²   Lungs: Bilateral wheezes     Interval 06/20  Finished 2 weeks of " "prednisone.  It helped but not like it used to.  She was still using her nebulizer about 3 times per day and before bedtime.  No nocturnal awakening.  She feels the hot humidity are bothering her.      PE:  /82 (BP Location: Left arm, Patient Position: Sitting)   Pulse 74   Temp 36.8 °C (98.3 °F) (Temporal)   Wt 73.9 kg (163 lb)   SpO2 95% Comment: RA  BMI 30.80 kg/m²   Lungs:CTA     Interval 6/5/2024  ACT 14. Montelukast, Advair  500/50   Using her albuterol frequently   Dupixent since jan 2024 not much improvement   Last pred burst in Jan 2024. No ER  visit or hospitalization     PE:  Temp 36.3 °C (97.4 °F)   Resp 22   Wt 69.4 kg (153 lb)   LMP  (LMP Unknown)   SpO2 93% Comment: Room air  BMI 28.91 kg/m²   Lungs: CTA     Interval 1/09/2024  Patient states she has \"gone downhill\" since last visit. Had \"double pneumonia\" and \"double asthma attack.\" Feels as though lungs have just been damaged as a result. Was also working in a building with mold, had to switch jobs. Reports \"asthma attack\" just before [place] with shortness of breath and wheezing. Also having increasing shortness of breath with exertion, that causes light-headedness. Walked with pulse ox yesterday and dropped to 89% SaO2.     Is using albuterol rescue inhaler every 4 hours. Also using Advair and Montelukast. She did put herself on steroids last week with rescue pack and this helped with shortness of breath but then symptoms worsened as soon as she stopped steroids.     Diag testing   PFT last 2021 ratio normal.  FEV1 1.77.  FVC 2.05, DLCO 16 pos BDR in the small airways no obstruction.     PFT 06 2024 ratio normal.  FEV1 1.91, FVC 2.2, DLCO 15.  No response to bronchodilator.  Feno 9      IgE 500 2022, 347 2023, neg aspergillus iGe and Igg  Eosinos 300   Eosinophils Absolute (x10E9/L)   Date Value   12/09/2022 0.30   03/11/2022 0.20   12/29/2020 0.17     IgE (IU/mL)   Date Value   03/16/2023 347 (H)     Aspergillus ige negative "   === 05/30/23 ===    CT CHEST WO IV CONTRAST    - Impression -  1. No significant interval change in the lungs compared to CT  03/13/2023, as described above. Findings may represent sequela of  prior infectious/inflammatory process such as reported recent COVID  pneumonia requiring hospitalization. Small airways disease and  atypical infectious processes remain differential considerations. A  component of organizing pneumonia is again not excluded.  2. Stable borderline and mildly enlarged mediastinal and upper  abdominal lymph nodes, likely reactive.  3. Small sliding hiatal hernia.  4. Stable 3 mm nodule in the right upper lobe.    I personally reviewed the images/study and I agree with the findings as stated by resident physician Dr. Daniel Rivas.    Chest CT 06 2024   1.Redemonstrated bilateral bandlike opacities and reticulations with  associated subtle ground-glass opacities bilaterally. Interval slight  worsening patchy ground-glass opacities within the right upper lobe.  Findings again may represent sequelae of COVID with possible  organizing pneumonia. Superimposed atypical infectious/inflammatory  process not excluded.  2. Similar appearance of mildly enlarged mediastinal lymph nodes,  likely reactive in nature.  3. Additional stable chronic findings as described above.    Sinus CT  CT 06/2024  wnl    === 11/27/24 ===    CT ABDOMEN PELVIS W IV CONTRAST    - Impression -  Basilar parenchymal opacities and parenchymal bands.  Please correlate  for component of pneumonia.  Enteritis.  Sigmoid colon diverticulosis without evidence of diverticulitis.  Small hiatal hernia.  Small hypodensity within the uterus suggests uterine fibroid.      Impression and Plan    54-year old AA female (ex smoker, quit 22 years, <5 pack years), h/o asthma (since age 25), htn, hypercholesteremia, here for evaluation of asthma symptoms that have been uncontrolled of late.  Patient experience shortness of breath and cough  constantly. She had covid about 2 years ago had to be hospitalized for 1 day.  Has been on steroids on and off. She has partial relief from the steroids     1) severe persistent asthma - Ige levels >500 -->323, eosi 150-300, FeNO O  9 and 5 but on prednisone. ANCA negative, Aspergillus IgE and igG negative   Continue albuterol nebs twice daily and up to 4 times as needed.  Continue Advair 500/50 1 puff twice daily. Rinse and gargle after each use ( she gets it from [facility])  Add LAMA. She will try to get it from [facility]  Continue montelukast  Dupilumab Jan-June 2024 no relief, Fasenra June 2024-July no relief   We will start tezspire/tezepelumab (Anti Thymic stromal lymphopoietin (TSLP) is an epithelial cell-derived cytokine that participates in asthma inflammation)  Patient to follow up with her allergist           2) Bilateral interstitial infiltrates - CT scan of the chest 05 2023 showed bilateral mild bronchial wall thickening and mucus plugging, minimal air trapping 3 mm RUL lung nodule. Patchy lobular bandlike areas of opacity in bilateral lungs.  Repeat CT shows persistent patchy infiltrates, likely residual from prior COVID.  (Aspergillus IgE and igG negative)      3) Allergic rhinitis and post nasal drip  - Continue Flonase 1 sq in each nostril twice daily  - Continue to follow up with allergy.    4) Asthma exacerbation--> pred burst

## 2024-12-16 ENCOUNTER — TELEPHONE (OUTPATIENT)
Dept: PULMONOLOGY | Facility: HOSPITAL | Age: 54
End: 2024-12-16
Payer: COMMERCIAL

## 2024-12-16 DIAGNOSIS — J45.50 SEVERE PERSISTENT ASTHMA, UNSPECIFIED WHETHER COMPLICATED (MULTI): ICD-10-CM

## 2024-12-16 RX ORDER — TEZEPELUMAB-EKKO 210 MG/1.9ML
210 INJECTION, SOLUTION SUBCUTANEOUS
Qty: 1.9 ML | Refills: 11 | Status: SHIPPED | OUTPATIENT
Start: 2024-12-16

## 2024-12-16 NOTE — TELEPHONE ENCOUNTER
Pt to start Tezspire injections for her asthma. Order placed and routed to Dr. Mcclendon to sign.

## 2024-12-17 ENCOUNTER — OFFICE VISIT (OUTPATIENT)
Dept: GASTROENTEROLOGY | Facility: HOSPITAL | Age: 54
End: 2024-12-17
Payer: COMMERCIAL

## 2024-12-17 ENCOUNTER — LAB (OUTPATIENT)
Dept: LAB | Facility: LAB | Age: 54
End: 2024-12-17
Payer: COMMERCIAL

## 2024-12-17 DIAGNOSIS — R19.7 DIARRHEA, UNSPECIFIED TYPE: Primary | ICD-10-CM

## 2024-12-17 DIAGNOSIS — R10.30 LOWER ABDOMINAL PAIN: ICD-10-CM

## 2024-12-17 DIAGNOSIS — R11.2 NAUSEA AND VOMITING, UNSPECIFIED VOMITING TYPE: ICD-10-CM

## 2024-12-17 DIAGNOSIS — R19.7 DIARRHEA, UNSPECIFIED TYPE: ICD-10-CM

## 2024-12-17 PROCEDURE — 3048F LDL-C <100 MG/DL: CPT

## 2024-12-17 PROCEDURE — 87493 C DIFF AMPLIFIED PROBE: CPT

## 2024-12-17 PROCEDURE — 87329 GIARDIA AG IA: CPT

## 2024-12-17 PROCEDURE — 87506 IADNA-DNA/RNA PROBE TQ 6-11: CPT

## 2024-12-17 PROCEDURE — 99213 OFFICE O/P EST LOW 20 MIN: CPT

## 2024-12-17 PROCEDURE — 87328 CRYPTOSPORIDIUM AG IA: CPT

## 2024-12-17 ASSESSMENT — ENCOUNTER SYMPTOMS
ABDOMINAL PAIN: 1
VOMITING: 1
DIARRHEA: 1
RECTAL PAIN: 0
BLOOD IN STOOL: 0
APPETITE CHANGE: 1
ANAL BLEEDING: 0
SHORTNESS OF BREATH: 0
TROUBLE SWALLOWING: 0
CONSTIPATION: 0
ABDOMINAL DISTENTION: 0
FEVER: 0
FATIGUE: 0
CHILLS: 0
NAUSEA: 1
COUGH: 0

## 2024-12-17 NOTE — ASSESSMENT & PLAN NOTE
Likely infectious stool, consider diverticulitis  - stool pcr, O&P and C.diff ordered  - consider CT

## 2024-12-17 NOTE — PROGRESS NOTES
Subjective     History of Present Illness:   Edmond Cool is a 54 y.o. female with PMHx of AAA, HTN, HLD, diabetes, GERD, chronic constipation, asthma who presents to GI clinic for further evaluation of diarrhea, nausea and vomiting    Today, the day before rubia had vomiting abd pain x 2 days and was constipated at the time w/ pneumonia. Ate a hamburger the night prior to n/v.  Treated pneumonia with doxycycline.  2024 CT- enteritis, small hiatal hernia.  Colonoscopy prior to these events.  Tried fiber supplement in the past, which did not work for constipation.  On Ozempic for diabetes for over 1 year.  Miralax helped with constipation.  Stomach pain during dose increase.  Stopped for 1 week.  Restarted last week on low dose.  Now liquid stools. Stopped Miralax.  Low Abdominal cramping and liquid diarrhea x 1 day over 9 times w/ 1 episode of vomiting.   Denies fevers or chills.   Denies constipation, dyspepsia, melena, hematochezia, dysphagia, unintentional weight loss    Denies ETOH, smoking, marijuana  Denies fxh GI cancer or IBD  Abdominal Surgeries:     Last colonoscopy 2024 Dr. Ledesma for screening: One 5 mm TA transverse, 2 mm hyperplastic polyp sigmoid, diverticulosis sigmoid and transverse, internal hemorrhoids.  Repeat 5 years  Last EGD       Past Medical History  As per HPI.     Social History  she  reports that she quit smoking about 25 years ago. Her smoking use included cigarettes. She has never used smokeless tobacco. She reports current alcohol use. She reports that she does not use drugs.     Family History  her family history includes Cancer in an other family member; Diabetes in an other family member; Hypertension in an other family member; No Known Problems in her mother.     Review of Systems  Review of Systems   Constitutional:  Positive for appetite change. Negative for chills, fatigue and fever.   HENT:  Negative for trouble swallowing.    Respiratory:   "Negative for cough and shortness of breath.    Gastrointestinal:  Positive for abdominal pain (low), diarrhea, nausea and vomiting. Negative for abdominal distention, anal bleeding, blood in stool, constipation and rectal pain.       Allergies  Allergies   Allergen Reactions    Bee Venom Protein (Honey Bee) Unknown and Shortness of breath     Severe allergy reaction to wasp    Dog Dander Unknown       Medications  Current Outpatient Medications   Medication Instructions    albuterol 90 mcg/actuation inhaler INHALE 2 PUFFS BY MOUTH EVERY 4 HOURS AS NEEDED FOR WHEEZING    albuterol 2.5 mg, nebulization, Every 6 hours PRN    alcohol swabs (Alcohol Pads) pads, medicated Use as directed for diabetes care    amLODIPine (NORVASC) 5 mg, oral, Daily    azelastine (Astelin) 137 mcg (0.1 %) nasal spray 1 spray, Each Nostril, 2 times daily, Use in each nostril as directed    BD Ultra-Fine April Pen Needle 32 gauge x 5/32\" needle     blood sugar diagnostic (Accu-Chek Guide test strips) strip Use as directed to test sugars up to 3 times daily    blood-glucose meter (Accu-Chek Guide Glucose Meter) misc Use to check glucose as directed    Fasenra Pen 30 mg, subcutaneous, Every 28 days    fluticasone (Flonase) 50 mcg/actuation nasal spray USE 1 SPRAY IN EACH NOSTRIL EVERY 12 HOURS    fluticasone propion-salmeteroL (Advair Diskus) 500-50 mcg/dose diskus inhaler 1 puff, inhalation, 2 times daily RT    fluticasone propion-salmeteroL (Advair Diskus) 500-50 mcg/dose diskus inhaler 1 puff, inhalation, 2 times daily RT, Rinse mouth with water after use to reduce aftertaste and incidence of candidiasis. Do not swallow.    lancets (Accu-Chek Softclix Lancets) misc Use 1 new lancet to test blood sugar 3 times a day    magnesium 30 mg, 2 times daily    meloxicam (MOBIC) 15 mg, oral, Daily    montelukast (SINGULAIR) 10 mg, oral, Nightly    Ozempic 1 mg, subcutaneous, Every 7 days    Ozempic 0.5 mg, subcutaneous, Every 7 days    pantoprazole " (ProtoNix) 40 mg EC tablet TAKE 1 TABLET BY MOUTH ONCE DAILY    rosuvastatin (Crestor) 20 mg tablet TAKE 1 TABLET BY MOUTH ONCE DAILY    sodium,potassium,mag sulfates (Suprep) 17.5-3.13-1.6 gram recon soln solution Take one bottle beginning at 6pm night before procedure and then take the other bottle 5 hours before procedure time as directed per instruction sheet    Tezspire 210 mg, subcutaneous, Every 28 days, 1 subcutaneous injection once every 4 weeks.    tiotropium (Spiriva Respimat) 2.5 mcg/actuation inhaler 2 puffs, inhalation, Daily    tiotropium (Spiriva Respimat) 2.5 mcg/actuation inhaler 2 puffs, inhalation, Daily    vitamin E 45 mg (100 unit) capsule Daily        Objective   There were no vitals taken for this visit.   Physical Exam  Constitutional:       Appearance: Normal appearance. She is normal weight.   HENT:      Mouth/Throat:      Mouth: Mucous membranes are dry.      Pharynx: Oropharynx is clear.   Cardiovascular:      Rate and Rhythm: Normal rate and regular rhythm.   Pulmonary:      Effort: Pulmonary effort is normal.      Breath sounds: Normal breath sounds. No wheezing or rhonchi.   Abdominal:      General: Abdomen is flat. Bowel sounds are normal. There is no distension.      Palpations: Abdomen is soft. There is no hepatomegaly.      Tenderness: There is abdominal tenderness (low abdomen). There is no guarding or rebound. Negative signs include Saab's sign.      Hernia: No hernia is present.   Musculoskeletal:         General: Normal range of motion.   Skin:     General: Skin is warm and dry.   Neurological:      General: No focal deficit present.      Mental Status: She is alert and oriented to person, place, and time.   Psychiatric:         Mood and Affect: Mood normal.         Behavior: Behavior normal.           Lab Results   Component Value Date    WBC 7.4 11/27/2024    WBC 4.6 06/05/2024    WBC CANCELED 03/19/2023    HGB 14.8 11/27/2024    HGB 14.2 06/05/2024    HGB CANCELED  03/19/2023    HCT 43.4 11/27/2024    HCT 41.7 06/05/2024    HCT CANCELED 03/19/2023     11/27/2024     06/05/2024    PLT CANCELED 03/19/2023     Lab Results   Component Value Date     11/27/2024     10/24/2024     06/05/2024    K 4.0 11/27/2024    K 4.5 10/24/2024    K 3.7 06/05/2024     11/27/2024     10/24/2024     06/05/2024    CO2 28 11/27/2024    CO2 27 10/24/2024    CO2 28 06/05/2024    BUN 12 11/27/2024    BUN 14 10/24/2024    BUN 13 06/05/2024    CREATININE 0.59 11/27/2024    CREATININE 0.52 10/24/2024    CREATININE 0.54 06/05/2024    CALCIUM 10.2 11/27/2024    CALCIUM 9.1 10/24/2024    CALCIUM 9.3 06/05/2024    PROT 8.4 (H) 11/27/2024    PROT 7.6 10/24/2024    PROT 7.4 03/12/2023    BILITOT 0.6 11/27/2024    BILITOT 0.5 10/24/2024    BILITOT 0.4 03/12/2023    ALKPHOS 265 (H) 11/27/2024    ALKPHOS 168 (H) 10/24/2024    ALKPHOS 125 (H) 03/12/2023    ALT 76 (H) 11/27/2024    ALT 47 (H) 10/24/2024    ALT 27 03/12/2023    AST 56 (H) 11/27/2024    AST 44 (H) 10/24/2024    AST 23 03/12/2023    GLUCOSE 85 11/27/2024    GLUCOSE 109 (H) 10/24/2024    GLUCOSE 93 06/05/2024           Edmond Cool is a 54 y.o. female who presents to GI clinic for diarrhea, n/v.    Diarrhea  Likely infectious stool, consider diverticulitis  - stool pcr, O&P and C.diff ordered  - consider CT         Gwen George, APRN-CNP

## 2024-12-17 NOTE — PATIENT INSTRUCTIONS
I recommend you stop ozempic until we get results  Please get stool tests and I will notify you of results  Page diet for now

## 2024-12-18 DIAGNOSIS — R19.7 DIARRHEA, UNSPECIFIED TYPE: Primary | ICD-10-CM

## 2024-12-18 DIAGNOSIS — R10.30 LOWER ABDOMINAL PAIN: ICD-10-CM

## 2024-12-18 DIAGNOSIS — R11.2 NAUSEA AND VOMITING, UNSPECIFIED VOMITING TYPE: ICD-10-CM

## 2024-12-18 LAB
C COLI+JEJ+UPSA DNA STL QL NAA+PROBE: NOT DETECTED
C DIF TOX TCDA+TCDB STL QL NAA+PROBE: NOT DETECTED
EC STX1 GENE STL QL NAA+PROBE: NOT DETECTED
EC STX2 GENE STL QL NAA+PROBE: NOT DETECTED
NOROVIRUS GI + GII RNA STL NAA+PROBE: NOT DETECTED
RV RNA STL NAA+PROBE: NOT DETECTED
SALMONELLA DNA STL QL NAA+PROBE: NOT DETECTED
SHIGELLA DNA SPEC QL NAA+PROBE: NOT DETECTED
V CHOLERAE DNA STL QL NAA+PROBE: NOT DETECTED
Y ENTEROCOL DNA STL QL NAA+PROBE: NOT DETECTED

## 2024-12-19 ENCOUNTER — HOSPITAL ENCOUNTER (OUTPATIENT)
Dept: RADIOLOGY | Facility: HOSPITAL | Age: 54
Discharge: HOME | End: 2024-12-19
Payer: COMMERCIAL

## 2024-12-19 DIAGNOSIS — R19.7 DIARRHEA, UNSPECIFIED TYPE: ICD-10-CM

## 2024-12-19 DIAGNOSIS — R10.30 LOWER ABDOMINAL PAIN: ICD-10-CM

## 2024-12-19 DIAGNOSIS — R11.2 NAUSEA AND VOMITING, UNSPECIFIED VOMITING TYPE: ICD-10-CM

## 2024-12-19 LAB
CRYPTOSP AG STL QL IA: NEGATIVE
G LAMBLIA AG STL QL IA: NEGATIVE

## 2024-12-19 PROCEDURE — 74018 RADEX ABDOMEN 1 VIEW: CPT | Performed by: RADIOLOGY

## 2024-12-19 PROCEDURE — 74018 RADEX ABDOMEN 1 VIEW: CPT

## 2024-12-19 NOTE — RESULT ENCOUNTER NOTE
Your CT is normal suggestive of constipation with diarrheal overflow.  I am still waiting on the remainder of the stool studies and will let you know when I receive them.

## 2024-12-26 PROCEDURE — RXMED WILLOW AMBULATORY MEDICATION CHARGE

## 2024-12-27 ENCOUNTER — PHARMACY VISIT (OUTPATIENT)
Dept: PHARMACY | Facility: CLINIC | Age: 54
End: 2024-12-27
Payer: COMMERCIAL

## 2024-12-27 ENCOUNTER — SPECIALTY PHARMACY (OUTPATIENT)
Dept: PHARMACY | Facility: CLINIC | Age: 54
End: 2024-12-27

## 2024-12-27 LAB — O+P STL MICRO: NEGATIVE

## 2025-01-27 ENCOUNTER — TELEPHONE (OUTPATIENT)
Dept: PRIMARY CARE | Facility: CLINIC | Age: 55
End: 2025-01-27

## 2025-01-27 PROCEDURE — RXMED WILLOW AMBULATORY MEDICATION CHARGE

## 2025-01-28 ENCOUNTER — PHARMACY VISIT (OUTPATIENT)
Dept: PHARMACY | Facility: CLINIC | Age: 55
End: 2025-01-28
Payer: COMMERCIAL

## 2025-01-30 DIAGNOSIS — N95.1 HOT FLASHES DUE TO MENOPAUSE: Primary | ICD-10-CM

## 2025-01-30 PROCEDURE — RXMED WILLOW AMBULATORY MEDICATION CHARGE

## 2025-01-30 RX ORDER — PAROXETINE 10 MG/1
10 TABLET, FILM COATED ORAL EVERY MORNING
Qty: 30 TABLET | Refills: 1 | Status: SHIPPED | OUTPATIENT
Start: 2025-01-30 | End: 2025-03-31

## 2025-01-31 ENCOUNTER — PHARMACY VISIT (OUTPATIENT)
Dept: PHARMACY | Facility: CLINIC | Age: 55
End: 2025-01-31
Payer: COMMERCIAL

## 2025-02-03 ENCOUNTER — OFFICE VISIT (OUTPATIENT)
Dept: PRIMARY CARE | Facility: HOSPITAL | Age: 55
End: 2025-02-03
Payer: COMMERCIAL

## 2025-02-03 ENCOUNTER — PHARMACY VISIT (OUTPATIENT)
Dept: PHARMACY | Facility: CLINIC | Age: 55
End: 2025-02-03
Payer: COMMERCIAL

## 2025-02-03 DIAGNOSIS — L03.313 CELLULITIS OF CHEST WALL: Primary | ICD-10-CM

## 2025-02-03 PROCEDURE — RXMED WILLOW AMBULATORY MEDICATION CHARGE

## 2025-02-03 PROCEDURE — 1036F TOBACCO NON-USER: CPT | Performed by: INTERNAL MEDICINE

## 2025-02-03 PROCEDURE — 99213 OFFICE O/P EST LOW 20 MIN: CPT | Performed by: INTERNAL MEDICINE

## 2025-02-03 RX ORDER — CEPHALEXIN 500 MG/1
500 CAPSULE ORAL 4 TIMES DAILY
Qty: 28 CAPSULE | Refills: 0 | Status: SHIPPED | OUTPATIENT
Start: 2025-02-03 | End: 2025-02-06 | Stop reason: SINTOL

## 2025-02-03 ASSESSMENT — ENCOUNTER SYMPTOMS
MYALGIAS: 0
ACTIVITY CHANGE: 0
SINUS PRESSURE: 0
DIARRHEA: 0
SORE THROAT: 0
CHEST TIGHTNESS: 0
AGITATION: 0
WEAKNESS: 0
PALPITATIONS: 0
SHORTNESS OF BREATH: 0
NAUSEA: 0
CHILLS: 0

## 2025-02-03 NOTE — ASSESSMENT & PLAN NOTE
Will treat briefly.   Orders:    cephalexin (Keflex) 500 mg capsule; Take 1 capsule (500 mg) by mouth 4 times a day for 7 days.

## 2025-02-03 NOTE — PROGRESS NOTES
Subjective   Patient ID: Edmond Cool is a 54 y.o. female who presents for No chief complaint on file.. Right sided papule is no tender and red.  Has been growing over the weekend.  No other fever or chills.     HPI     Review of Systems   Constitutional:  Negative for activity change and chills.   HENT:  Negative for congestion, sinus pressure and sore throat.    Respiratory:  Negative for chest tightness and shortness of breath.    Cardiovascular:  Negative for chest pain and palpitations.   Gastrointestinal:  Negative for diarrhea and nausea.   Musculoskeletal:  Negative for myalgias.   Skin:         Red papule   Neurological:  Negative for weakness.   Psychiatric/Behavioral:  Negative for agitation and behavioral problems.        Objective   There were no vitals taken for this visit.    Physical Exam  Constitutional:       Appearance: Normal appearance.   HENT:      Head: Normocephalic.   Cardiovascular:      Rate and Rhythm: Normal rate and regular rhythm.   Pulmonary:      Effort: No respiratory distress.      Breath sounds: No wheezing.   Skin:     Comments: Small round white papule with surround erythema.           Assessment/Plan   Assessment & Plan  Cellulitis of chest wall  Will treat briefly.   Orders:    cephalexin (Keflex) 500 mg capsule; Take 1 capsule (500 mg) by mouth 4 times a day for 7 days.

## 2025-02-06 DIAGNOSIS — L02.213 CHEST WALL ABSCESS: Primary | ICD-10-CM

## 2025-02-06 PROCEDURE — RXMED WILLOW AMBULATORY MEDICATION CHARGE

## 2025-02-06 RX ORDER — DOXYCYCLINE HYCLATE 100 MG
100 TABLET ORAL 2 TIMES DAILY
Qty: 14 TABLET | Refills: 0 | Status: SHIPPED | OUTPATIENT
Start: 2025-02-06 | End: 2025-02-14

## 2025-02-07 ENCOUNTER — APPOINTMENT (OUTPATIENT)
Dept: CARDIOLOGY | Facility: HOSPITAL | Age: 55
End: 2025-02-07
Payer: COMMERCIAL

## 2025-02-07 ENCOUNTER — HOSPITAL ENCOUNTER (OUTPATIENT)
Facility: HOSPITAL | Age: 55
Setting detail: OBSERVATION
Discharge: HOME | End: 2025-02-08
Attending: EMERGENCY MEDICINE | Admitting: STUDENT IN AN ORGANIZED HEALTH CARE EDUCATION/TRAINING PROGRAM
Payer: COMMERCIAL

## 2025-02-07 ENCOUNTER — APPOINTMENT (OUTPATIENT)
Dept: RADIOLOGY | Facility: HOSPITAL | Age: 55
End: 2025-02-07
Payer: COMMERCIAL

## 2025-02-07 ENCOUNTER — PHARMACY VISIT (OUTPATIENT)
Dept: PHARMACY | Facility: CLINIC | Age: 55
End: 2025-02-07
Payer: COMMERCIAL

## 2025-02-07 DIAGNOSIS — R42 VERTIGO: ICD-10-CM

## 2025-02-07 DIAGNOSIS — R42 DIZZINESS: Primary | ICD-10-CM

## 2025-02-07 DIAGNOSIS — L02.91 ABSCESS: ICD-10-CM

## 2025-02-07 LAB
ALBUMIN SERPL BCP-MCNC: 4.4 G/DL (ref 3.4–5)
ALP SERPL-CCNC: 150 U/L (ref 33–110)
ALT SERPL W P-5'-P-CCNC: 69 U/L (ref 7–45)
ANION GAP SERPL CALC-SCNC: 12 MMOL/L (ref 10–20)
AST SERPL W P-5'-P-CCNC: 58 U/L (ref 9–39)
BASOPHILS # BLD AUTO: 0.05 X10*3/UL (ref 0–0.1)
BASOPHILS NFR BLD AUTO: 1 %
BILIRUB SERPL-MCNC: 0.3 MG/DL (ref 0–1.2)
BNP SERPL-MCNC: 15 PG/ML (ref 0–99)
BUN SERPL-MCNC: 12 MG/DL (ref 6–23)
CALCIUM SERPL-MCNC: 9.6 MG/DL (ref 8.6–10.3)
CHLORIDE SERPL-SCNC: 103 MMOL/L (ref 98–107)
CHOLEST SERPL-MCNC: 137 MG/DL (ref 0–199)
CHOLESTEROL/HDL RATIO: 2.4
CO2 SERPL-SCNC: 28 MMOL/L (ref 21–32)
CREAT SERPL-MCNC: 0.68 MG/DL (ref 0.5–1.05)
EGFRCR SERPLBLD CKD-EPI 2021: >90 ML/MIN/1.73M*2
EOSINOPHIL # BLD AUTO: 0.24 X10*3/UL (ref 0–0.7)
EOSINOPHIL NFR BLD AUTO: 4.9 %
ERYTHROCYTE [DISTWIDTH] IN BLOOD BY AUTOMATED COUNT: 13.2 % (ref 11.5–14.5)
GLUCOSE SERPL-MCNC: 96 MG/DL (ref 74–99)
HCT VFR BLD AUTO: 41.1 % (ref 36–46)
HDLC SERPL-MCNC: 57.1 MG/DL
HGB BLD-MCNC: 14.3 G/DL (ref 12–16)
IMM GRANULOCYTES # BLD AUTO: 0.02 X10*3/UL (ref 0–0.7)
IMM GRANULOCYTES NFR BLD AUTO: 0.4 % (ref 0–0.9)
LDLC SERPL CALC-MCNC: 53 MG/DL
LYMPHOCYTES # BLD AUTO: 1.5 X10*3/UL (ref 1.2–4.8)
LYMPHOCYTES NFR BLD AUTO: 30.7 %
MAGNESIUM SERPL-MCNC: 1.96 MG/DL (ref 1.6–2.4)
MCH RBC QN AUTO: 29.4 PG (ref 26–34)
MCHC RBC AUTO-ENTMCNC: 34.8 G/DL (ref 32–36)
MCV RBC AUTO: 84 FL (ref 80–100)
MONOCYTES # BLD AUTO: 0.61 X10*3/UL (ref 0.1–1)
MONOCYTES NFR BLD AUTO: 12.5 %
NEUTROPHILS # BLD AUTO: 2.47 X10*3/UL (ref 1.2–7.7)
NEUTROPHILS NFR BLD AUTO: 50.5 %
NON HDL CHOLESTEROL: 80 MG/DL (ref 0–149)
NRBC BLD-RTO: 0 /100 WBCS (ref 0–0)
PLATELET # BLD AUTO: 237 X10*3/UL (ref 150–450)
POTASSIUM SERPL-SCNC: 3.9 MMOL/L (ref 3.5–5.3)
PROT SERPL-MCNC: 7.6 G/DL (ref 6.4–8.2)
RBC # BLD AUTO: 4.87 X10*6/UL (ref 4–5.2)
SODIUM SERPL-SCNC: 139 MMOL/L (ref 136–145)
TRIGL SERPL-MCNC: 136 MG/DL (ref 0–149)
VLDL: 27 MG/DL (ref 0–40)
WBC # BLD AUTO: 4.9 X10*3/UL (ref 4.4–11.3)

## 2025-02-07 PROCEDURE — 96365 THER/PROPH/DIAG IV INF INIT: CPT | Mod: 59

## 2025-02-07 PROCEDURE — 93005 ELECTROCARDIOGRAM TRACING: CPT

## 2025-02-07 PROCEDURE — 83718 ASSAY OF LIPOPROTEIN: CPT | Performed by: NURSE PRACTITIONER

## 2025-02-07 PROCEDURE — 99222 1ST HOSP IP/OBS MODERATE 55: CPT | Performed by: NURSE PRACTITIONER

## 2025-02-07 PROCEDURE — G0378 HOSPITAL OBSERVATION PER HR: HCPCS

## 2025-02-07 PROCEDURE — 2500000002 HC RX 250 W HCPCS SELF ADMINISTERED DRUGS (ALT 637 FOR MEDICARE OP, ALT 636 FOR OP/ED): Performed by: NURSE PRACTITIONER

## 2025-02-07 PROCEDURE — 83880 ASSAY OF NATRIURETIC PEPTIDE: CPT | Performed by: NURSE PRACTITIONER

## 2025-02-07 PROCEDURE — 36415 COLL VENOUS BLD VENIPUNCTURE: CPT | Performed by: NURSE PRACTITIONER

## 2025-02-07 PROCEDURE — 2500000004 HC RX 250 GENERAL PHARMACY W/ HCPCS (ALT 636 FOR OP/ED): Performed by: NURSE PRACTITIONER

## 2025-02-07 PROCEDURE — 2550000001 HC RX 255 CONTRASTS: Performed by: EMERGENCY MEDICINE

## 2025-02-07 PROCEDURE — 70496 CT ANGIOGRAPHY HEAD: CPT

## 2025-02-07 PROCEDURE — 70450 CT HEAD/BRAIN W/O DYE: CPT

## 2025-02-07 PROCEDURE — 84075 ASSAY ALKALINE PHOSPHATASE: CPT | Performed by: PHYSICIAN ASSISTANT

## 2025-02-07 PROCEDURE — 2500000001 HC RX 250 WO HCPCS SELF ADMINISTERED DRUGS (ALT 637 FOR MEDICARE OP): Performed by: PHYSICIAN ASSISTANT

## 2025-02-07 PROCEDURE — 10060 I&D ABSCESS SIMPLE/SINGLE: CPT | Performed by: PHYSICIAN ASSISTANT

## 2025-02-07 PROCEDURE — 83735 ASSAY OF MAGNESIUM: CPT | Performed by: PHYSICIAN ASSISTANT

## 2025-02-07 PROCEDURE — 2500000001 HC RX 250 WO HCPCS SELF ADMINISTERED DRUGS (ALT 637 FOR MEDICARE OP): Performed by: NURSE PRACTITIONER

## 2025-02-07 PROCEDURE — 99285 EMERGENCY DEPT VISIT HI MDM: CPT | Mod: 25 | Performed by: EMERGENCY MEDICINE

## 2025-02-07 PROCEDURE — 70496 CT ANGIOGRAPHY HEAD: CPT | Performed by: STUDENT IN AN ORGANIZED HEALTH CARE EDUCATION/TRAINING PROGRAM

## 2025-02-07 PROCEDURE — 70551 MRI BRAIN STEM W/O DYE: CPT

## 2025-02-07 PROCEDURE — 70551 MRI BRAIN STEM W/O DYE: CPT | Performed by: STUDENT IN AN ORGANIZED HEALTH CARE EDUCATION/TRAINING PROGRAM

## 2025-02-07 PROCEDURE — 85025 COMPLETE CBC W/AUTO DIFF WBC: CPT | Performed by: PHYSICIAN ASSISTANT

## 2025-02-07 PROCEDURE — 36415 COLL VENOUS BLD VENIPUNCTURE: CPT | Performed by: PHYSICIAN ASSISTANT

## 2025-02-07 PROCEDURE — 70498 CT ANGIOGRAPHY NECK: CPT | Performed by: STUDENT IN AN ORGANIZED HEALTH CARE EDUCATION/TRAINING PROGRAM

## 2025-02-07 PROCEDURE — 83036 HEMOGLOBIN GLYCOSYLATED A1C: CPT | Mod: AHULAB | Performed by: NURSE PRACTITIONER

## 2025-02-07 PROCEDURE — 2500000004 HC RX 250 GENERAL PHARMACY W/ HCPCS (ALT 636 FOR OP/ED): Performed by: PHYSICIAN ASSISTANT

## 2025-02-07 PROCEDURE — 70450 CT HEAD/BRAIN W/O DYE: CPT | Performed by: RADIOLOGY

## 2025-02-07 PROCEDURE — 2500000002 HC RX 250 W HCPCS SELF ADMINISTERED DRUGS (ALT 637 FOR MEDICARE OP, ALT 636 FOR OP/ED): Performed by: PHYSICIAN ASSISTANT

## 2025-02-07 RX ORDER — ACETAMINOPHEN 325 MG/1
650 TABLET ORAL EVERY 4 HOURS PRN
Status: DISCONTINUED | OUTPATIENT
Start: 2025-02-07 | End: 2025-02-08 | Stop reason: HOSPADM

## 2025-02-07 RX ORDER — ACETAMINOPHEN 160 MG/5ML
650 SOLUTION ORAL EVERY 4 HOURS PRN
Status: DISCONTINUED | OUTPATIENT
Start: 2025-02-07 | End: 2025-02-08 | Stop reason: HOSPADM

## 2025-02-07 RX ORDER — BUDESONIDE 0.5 MG/2ML
0.5 INHALANT ORAL
Status: DISCONTINUED | OUTPATIENT
Start: 2025-02-07 | End: 2025-02-08 | Stop reason: HOSPADM

## 2025-02-07 RX ORDER — FLUTICASONE PROPIONATE 50 MCG
1 SPRAY, SUSPENSION (ML) NASAL 2 TIMES DAILY
Status: DISCONTINUED | OUTPATIENT
Start: 2025-02-07 | End: 2025-02-08 | Stop reason: HOSPADM

## 2025-02-07 RX ORDER — OXYCODONE HYDROCHLORIDE 5 MG/1
5 TABLET ORAL EVERY 6 HOURS PRN
Status: DISCONTINUED | OUTPATIENT
Start: 2025-02-07 | End: 2025-02-08 | Stop reason: HOSPADM

## 2025-02-07 RX ORDER — ACETAMINOPHEN 650 MG/1
650 SUPPOSITORY RECTAL EVERY 4 HOURS PRN
Status: DISCONTINUED | OUTPATIENT
Start: 2025-02-07 | End: 2025-02-08 | Stop reason: HOSPADM

## 2025-02-07 RX ORDER — PANTOPRAZOLE SODIUM 40 MG/1
40 TABLET, DELAYED RELEASE ORAL
Status: DISCONTINUED | OUTPATIENT
Start: 2025-02-08 | End: 2025-02-08 | Stop reason: HOSPADM

## 2025-02-07 RX ORDER — MONTELUKAST SODIUM 10 MG/1
10 TABLET ORAL NIGHTLY
Status: DISCONTINUED | OUTPATIENT
Start: 2025-02-07 | End: 2025-02-08 | Stop reason: HOSPADM

## 2025-02-07 RX ORDER — DEXTROSE 50 % IN WATER (D50W) INTRAVENOUS SYRINGE
25
Status: DISCONTINUED | OUTPATIENT
Start: 2025-02-07 | End: 2025-02-08 | Stop reason: HOSPADM

## 2025-02-07 RX ORDER — ONDANSETRON HYDROCHLORIDE 2 MG/ML
4 INJECTION, SOLUTION INTRAVENOUS EVERY 8 HOURS PRN
Status: DISCONTINUED | OUTPATIENT
Start: 2025-02-07 | End: 2025-02-08 | Stop reason: HOSPADM

## 2025-02-07 RX ORDER — ROSUVASTATIN CALCIUM 20 MG/1
20 TABLET, COATED ORAL NIGHTLY
Status: DISCONTINUED | OUTPATIENT
Start: 2025-02-07 | End: 2025-02-08 | Stop reason: HOSPADM

## 2025-02-07 RX ORDER — ALBUTEROL SULFATE 0.83 MG/ML
2.5 SOLUTION RESPIRATORY (INHALATION) EVERY 2 HOUR PRN
Status: DISCONTINUED | OUTPATIENT
Start: 2025-02-07 | End: 2025-02-08 | Stop reason: HOSPADM

## 2025-02-07 RX ORDER — INSULIN LISPRO 100 [IU]/ML
0-5 INJECTION, SOLUTION INTRAVENOUS; SUBCUTANEOUS
Status: DISCONTINUED | OUTPATIENT
Start: 2025-02-08 | End: 2025-02-08 | Stop reason: HOSPADM

## 2025-02-07 RX ORDER — MECLIZINE HYDROCHLORIDE 25 MG/1
25 TABLET ORAL 3 TIMES DAILY PRN
Status: DISCONTINUED | OUTPATIENT
Start: 2025-02-07 | End: 2025-02-08 | Stop reason: HOSPADM

## 2025-02-07 RX ORDER — ONDANSETRON 4 MG/1
4 TABLET, ORALLY DISINTEGRATING ORAL EVERY 8 HOURS PRN
Status: DISCONTINUED | OUTPATIENT
Start: 2025-02-07 | End: 2025-02-08 | Stop reason: HOSPADM

## 2025-02-07 RX ORDER — DIAZEPAM 5 MG/1
5 TABLET ORAL ONCE
Status: COMPLETED | OUTPATIENT
Start: 2025-02-07 | End: 2025-02-07

## 2025-02-07 RX ORDER — CEFAZOLIN SODIUM 1 G/50ML
1 SOLUTION INTRAVENOUS EVERY 8 HOURS
Status: DISCONTINUED | OUTPATIENT
Start: 2025-02-07 | End: 2025-02-08 | Stop reason: HOSPADM

## 2025-02-07 RX ORDER — ASPIRIN 81 MG/1
81 TABLET ORAL DAILY
Status: DISCONTINUED | OUTPATIENT
Start: 2025-02-07 | End: 2025-02-08 | Stop reason: HOSPADM

## 2025-02-07 RX ORDER — DOXYCYCLINE HYCLATE 100 MG
100 TABLET ORAL 2 TIMES DAILY
Status: DISCONTINUED | OUTPATIENT
Start: 2025-02-07 | End: 2025-02-07

## 2025-02-07 RX ORDER — BISACODYL 5 MG
10 TABLET, DELAYED RELEASE (ENTERIC COATED) ORAL DAILY PRN
Status: DISCONTINUED | OUTPATIENT
Start: 2025-02-07 | End: 2025-02-08 | Stop reason: HOSPADM

## 2025-02-07 RX ORDER — MECLIZINE HYDROCHLORIDE 25 MG/1
25 TABLET ORAL ONCE
Status: COMPLETED | OUTPATIENT
Start: 2025-02-07 | End: 2025-02-07

## 2025-02-07 RX ORDER — PAROXETINE HYDROCHLORIDE 20 MG/1
10 TABLET, FILM COATED ORAL EVERY MORNING
Status: DISCONTINUED | OUTPATIENT
Start: 2025-02-08 | End: 2025-02-08 | Stop reason: HOSPADM

## 2025-02-07 RX ORDER — IPRATROPIUM BROMIDE 0.5 MG/2.5ML
0.5 SOLUTION RESPIRATORY (INHALATION)
Status: DISCONTINUED | OUTPATIENT
Start: 2025-02-07 | End: 2025-02-07

## 2025-02-07 RX ORDER — POLYETHYLENE GLYCOL 3350 17 G/17G
17 POWDER, FOR SOLUTION ORAL DAILY
Status: DISCONTINUED | OUTPATIENT
Start: 2025-02-07 | End: 2025-02-08 | Stop reason: HOSPADM

## 2025-02-07 RX ORDER — FORMOTEROL FUMARATE DIHYDRATE 20 UG/2ML
20 SOLUTION RESPIRATORY (INHALATION)
Status: DISCONTINUED | OUTPATIENT
Start: 2025-02-07 | End: 2025-02-08 | Stop reason: HOSPADM

## 2025-02-07 RX ORDER — DEXTROSE 50 % IN WATER (D50W) INTRAVENOUS SYRINGE
12.5
Status: DISCONTINUED | OUTPATIENT
Start: 2025-02-07 | End: 2025-02-08 | Stop reason: HOSPADM

## 2025-02-07 RX ORDER — AMLODIPINE BESYLATE 5 MG/1
5 TABLET ORAL DAILY
Status: DISCONTINUED | OUTPATIENT
Start: 2025-02-08 | End: 2025-02-08 | Stop reason: HOSPADM

## 2025-02-07 RX ORDER — FLUTICASONE FUROATE AND VILANTEROL 200; 25 UG/1; UG/1
1 POWDER RESPIRATORY (INHALATION)
Status: DISCONTINUED | OUTPATIENT
Start: 2025-02-08 | End: 2025-02-07 | Stop reason: CLARIF

## 2025-02-07 RX ADMIN — OXYCODONE HYDROCHLORIDE 5 MG: 5 TABLET ORAL at 22:28

## 2025-02-07 RX ADMIN — MONTELUKAST 10 MG: 10 TABLET, FILM COATED ORAL at 22:28

## 2025-02-07 RX ADMIN — SODIUM CHLORIDE 1000 ML: 9 INJECTION, SOLUTION INTRAVENOUS at 15:10

## 2025-02-07 RX ADMIN — Medication 1 APPLICATION: at 15:09

## 2025-02-07 RX ADMIN — IOHEXOL 75 ML: 350 INJECTION, SOLUTION INTRAVENOUS at 19:39

## 2025-02-07 RX ADMIN — ROSUVASTATIN 20 MG: 20 TABLET, FILM COATED ORAL at 22:28

## 2025-02-07 RX ADMIN — CEFAZOLIN SODIUM 1 G: 1 INJECTION, SOLUTION INTRAVENOUS at 19:30

## 2025-02-07 RX ADMIN — DIAZEPAM 5 MG: 5 TABLET ORAL at 17:06

## 2025-02-07 RX ADMIN — MECLIZINE HYDROCHLORIDE 25 MG: 25 TABLET ORAL at 22:28

## 2025-02-07 RX ADMIN — MECLIZINE HYDROCHLORIDE 25 MG: 25 TABLET ORAL at 15:09

## 2025-02-07 SDOH — SOCIAL STABILITY: SOCIAL INSECURITY
WITHIN THE LAST YEAR, HAVE YOU BEEN KICKED, HIT, SLAPPED, OR OTHERWISE PHYSICALLY HURT BY YOUR PARTNER OR EX-PARTNER?: NO

## 2025-02-07 SDOH — ECONOMIC STABILITY: HOUSING INSECURITY: IN THE LAST 12 MONTHS, WAS THERE A TIME WHEN YOU WERE NOT ABLE TO PAY THE MORTGAGE OR RENT ON TIME?: NO

## 2025-02-07 SDOH — ECONOMIC STABILITY: INCOME INSECURITY: IN THE PAST 12 MONTHS HAS THE ELECTRIC, GAS, OIL, OR WATER COMPANY THREATENED TO SHUT OFF SERVICES IN YOUR HOME?: NO

## 2025-02-07 SDOH — ECONOMIC STABILITY: FOOD INSECURITY: HOW HARD IS IT FOR YOU TO PAY FOR THE VERY BASICS LIKE FOOD, HOUSING, MEDICAL CARE, AND HEATING?: NOT HARD AT ALL

## 2025-02-07 SDOH — SOCIAL STABILITY: SOCIAL INSECURITY: ARE YOU OR HAVE YOU BEEN THREATENED OR ABUSED PHYSICALLY, EMOTIONALLY, OR SEXUALLY BY ANYONE?: NO

## 2025-02-07 SDOH — SOCIAL STABILITY: SOCIAL INSECURITY: HAS ANYONE EVER THREATENED TO HURT YOUR FAMILY OR YOUR PETS?: NO

## 2025-02-07 SDOH — SOCIAL STABILITY: SOCIAL INSECURITY: DO YOU FEEL UNSAFE GOING BACK TO THE PLACE WHERE YOU ARE LIVING?: NO

## 2025-02-07 SDOH — SOCIAL STABILITY: SOCIAL INSECURITY: ABUSE: ADULT

## 2025-02-07 SDOH — ECONOMIC STABILITY: FOOD INSECURITY: WITHIN THE PAST 12 MONTHS, YOU WORRIED THAT YOUR FOOD WOULD RUN OUT BEFORE YOU GOT THE MONEY TO BUY MORE.: NEVER TRUE

## 2025-02-07 SDOH — SOCIAL STABILITY: SOCIAL INSECURITY
WITHIN THE LAST YEAR, HAVE YOU BEEN RAPED OR FORCED TO HAVE ANY KIND OF SEXUAL ACTIVITY BY YOUR PARTNER OR EX-PARTNER?: NO

## 2025-02-07 SDOH — ECONOMIC STABILITY: FOOD INSECURITY: WITHIN THE PAST 12 MONTHS, THE FOOD YOU BOUGHT JUST DIDN'T LAST AND YOU DIDN'T HAVE MONEY TO GET MORE.: NEVER TRUE

## 2025-02-07 SDOH — SOCIAL STABILITY: SOCIAL INSECURITY: ARE THERE ANY APPARENT SIGNS OF INJURIES/BEHAVIORS THAT COULD BE RELATED TO ABUSE/NEGLECT?: NO

## 2025-02-07 SDOH — SOCIAL STABILITY: SOCIAL INSECURITY: WITHIN THE LAST YEAR, HAVE YOU BEEN HUMILIATED OR EMOTIONALLY ABUSED IN OTHER WAYS BY YOUR PARTNER OR EX-PARTNER?: NO

## 2025-02-07 SDOH — SOCIAL STABILITY: SOCIAL INSECURITY: HAVE YOU HAD ANY THOUGHTS OF HARMING ANYONE ELSE?: NO

## 2025-02-07 SDOH — SOCIAL STABILITY: SOCIAL INSECURITY: HAVE YOU HAD THOUGHTS OF HARMING ANYONE ELSE?: NO

## 2025-02-07 SDOH — SOCIAL STABILITY: SOCIAL INSECURITY: WITHIN THE LAST YEAR, HAVE YOU BEEN AFRAID OF YOUR PARTNER OR EX-PARTNER?: NO

## 2025-02-07 SDOH — ECONOMIC STABILITY: HOUSING INSECURITY: AT ANY TIME IN THE PAST 12 MONTHS, WERE YOU HOMELESS OR LIVING IN A SHELTER (INCLUDING NOW)?: NO

## 2025-02-07 SDOH — SOCIAL STABILITY: SOCIAL INSECURITY: DOES ANYONE TRY TO KEEP YOU FROM HAVING/CONTACTING OTHER FRIENDS OR DOING THINGS OUTSIDE YOUR HOME?: NO

## 2025-02-07 SDOH — ECONOMIC STABILITY: HOUSING INSECURITY: IN THE PAST 12 MONTHS, HOW MANY TIMES HAVE YOU MOVED WHERE YOU WERE LIVING?: 0

## 2025-02-07 SDOH — ECONOMIC STABILITY: TRANSPORTATION INSECURITY: IN THE PAST 12 MONTHS, HAS LACK OF TRANSPORTATION KEPT YOU FROM MEDICAL APPOINTMENTS OR FROM GETTING MEDICATIONS?: NO

## 2025-02-07 SDOH — SOCIAL STABILITY: SOCIAL INSECURITY: DO YOU FEEL ANYONE HAS EXPLOITED OR TAKEN ADVANTAGE OF YOU FINANCIALLY OR OF YOUR PERSONAL PROPERTY?: NO

## 2025-02-07 SDOH — SOCIAL STABILITY: SOCIAL INSECURITY: WERE YOU ABLE TO COMPLETE ALL THE BEHAVIORAL HEALTH SCREENINGS?: YES

## 2025-02-07 ASSESSMENT — PAIN - FUNCTIONAL ASSESSMENT
PAIN_FUNCTIONAL_ASSESSMENT: 0-10

## 2025-02-07 ASSESSMENT — ACTIVITIES OF DAILY LIVING (ADL)
BATHING: INDEPENDENT
DRESSING YOURSELF: INDEPENDENT
HOW_WELL_CAN_YOU_FEED_YOURSELF: INDEPENDENTLY
HOW_WELL_CAN_YOU_BATHE_YOURSELF: INDEPENDENTLY
HOW_WELL_CAN_YOU_COMPLETE_GROOMING_TASKS: INDEPENDENTLY
ADL_BEFORE_ADMISSION: RIGHT
GROOMING: INDEPENDENT
LACK_OF_TRANSPORTATION: NO
HEARING_LEFT_EAR: NO PROBLEMS
WALKS IN HOME: INDEPENDENT
HEARING - RIGHT EAR: FUNCTIONAL
HOW_WELL_CAN_YOU_USE_BATHROOM_BY_YOURSELF: INDEPENDENTLY
ADEQUATE_TO_COMPLETE_ADL: YES
FEEDING YOURSELF: INDEPENDENT
HOW_WELL_CAN_YOU_DRESS_YOURSELF: INDEPENDENTLY
ADEQUATE_TO_COMPLETE_ADL: YES
WALKS_IN_HOME: INDEPENDENTLY
DRESSING: INDEPENDENT
ADEQUATE_TO_COMPLETE_ADL: YES
LACK_OF_TRANSPORTATION: NO
HEARING_RIGHT_EAR: NO PROBLEMS
ADL_BEFORE_ADMISSION: INDEPENDENTLY
BATHING: INDEPENDENT
TOILETING: INDEPENDENT
HEARING - LEFT EAR: FUNCTIONAL
JUDGMENT_ADEQUATE_SAFELY_COMPLETE_DAILY_ACTIVITIES: YES
FEEDING: INDEPENDENT
TOILETING: INDEPENDENT
PATIENT'S MEMORY ADEQUATE TO SAFELY COMPLETE DAILY ACTIVITIES?: YES

## 2025-02-07 ASSESSMENT — LIFESTYLE VARIABLES
SKIP TO QUESTIONS 9-10: 1
AUDIT-C TOTAL SCORE: 0
HOW OFTEN DO YOU HAVE 6 OR MORE DRINKS ON ONE OCCASION: NEVER
AUDIT-C TOTAL SCORE: 0
HOW OFTEN DO YOU HAVE A DRINK CONTAINING ALCOHOL: NEVER
HOW MANY STANDARD DRINKS CONTAINING ALCOHOL DO YOU HAVE ON A TYPICAL DAY: PATIENT DOES NOT DRINK

## 2025-02-07 ASSESSMENT — PAIN SCALES - GENERAL
PAINLEVEL_OUTOF10: 6
PAINLEVEL_OUTOF10: 0 - NO PAIN
PAINLEVEL_OUTOF10: 3
PAINLEVEL_OUTOF10: 6

## 2025-02-07 ASSESSMENT — COGNITIVE AND FUNCTIONAL STATUS - GENERAL
PATIENT BASELINE BEDBOUND: NO
DAILY ACTIVITIY SCORE: 24
MOBILITY SCORE: 24

## 2025-02-07 ASSESSMENT — PAIN DESCRIPTION - LOCATION
LOCATION: CHEST
LOCATION: CHEST

## 2025-02-07 ASSESSMENT — COLUMBIA-SUICIDE SEVERITY RATING SCALE - C-SSRS
2. HAVE YOU ACTUALLY HAD ANY THOUGHTS OF KILLING YOURSELF?: NO
6. HAVE YOU EVER DONE ANYTHING, STARTED TO DO ANYTHING, OR PREPARED TO DO ANYTHING TO END YOUR LIFE?: NO
1. IN THE PAST MONTH, HAVE YOU WISHED YOU WERE DEAD OR WISHED YOU COULD GO TO SLEEP AND NOT WAKE UP?: NO

## 2025-02-07 ASSESSMENT — PAIN DESCRIPTION - ORIENTATION
ORIENTATION: RIGHT;UPPER
ORIENTATION: RIGHT

## 2025-02-07 ASSESSMENT — PATIENT HEALTH QUESTIONNAIRE - PHQ9
2. FEELING DOWN, DEPRESSED OR HOPELESS: NOT AT ALL
SUM OF ALL RESPONSES TO PHQ9 QUESTIONS 1 & 2: 0
1. LITTLE INTEREST OR PLEASURE IN DOING THINGS: NOT AT ALL

## 2025-02-07 ASSESSMENT — ENCOUNTER SYMPTOMS
WOUND: 1
DIZZINESS: 1

## 2025-02-07 NOTE — ED PROVIDER NOTES
HPI   Chief Complaint   Patient presents with    Dizziness    Abscess       Is a 54-year-old female who complains of an abscess and also dizziness.  Over the past few weeks she has developed an abscess of the clavicle on the right side she was placed on Keflex and states ever since she felt some dizziness where she feels like she is spinning.  No nausea or vomiting.  She has had some ataxia.  Denies dizziness in the past.  She is worse with movements especially turning her head.    Her doctor switched her to doxycycline but she has not started it yet.    She does have underlying diabetes but is diet controlled.              Patient History   Past Medical History:   Diagnosis Date    Allergy to seafood 12/15/2022    History of allergy to shellfish    Candidiasis, unspecified 12/09/2022    Yeast infection    Encounter for contraceptive management, unspecified 08/09/2021    Contraception management    Encounter for gynecological examination (general) (routine) without abnormal findings 09/11/2020    Women's annual routine gynecological examination    Essential (primary) hypertension 05/23/2019    HTN (hypertension), benign    Other specified noninflammatory disorders of vagina 12/06/2021    Vaginal irritation    Personal history of other diseases of the musculoskeletal system and connective tissue 05/23/2019    History of herniated intervertebral disc    Personal history of other diseases of the respiratory system     History of sinusitis    Personal history of other endocrine, nutritional and metabolic disease 05/23/2019    History of hypercholesterolemia    Personal history of other infectious and parasitic diseases 12/13/2021    History of trichomonal vaginitis    Personal history of other medical treatment 05/20/2021    History of screening mammography    Personal history of urinary (tract) infections     History of urinary tract infection     Past Surgical History:   Procedure Laterality Date    BREAST BIOPSY       OTHER SURGICAL HISTORY  2019     section     Family History   Problem Relation Name Age of Onset    No Known Problems Mother      Diabetes Other      Hypertension Other      Cancer Other       Social History     Tobacco Use    Smoking status: Former     Current packs/day: 0.00     Types: Cigarettes     Quit date:      Years since quittin.1    Smokeless tobacco: Never   Vaping Use    Vaping status: Never Used   Substance Use Topics    Alcohol use: Yes     Comment: social    Drug use: Never       Physical Exam   ED Triage Vitals [25 1338]   Temperature Heart Rate Respirations BP   36.6 °C (97.8 °F) 78 17 (!) 156/91      Pulse Ox Temp Source Heart Rate Source Patient Position   95 % Temporal Monitor --      BP Location FiO2 (%)     -- --       Physical Exam  Vitals reviewed.   Constitutional:       General: She is not in acute distress.     Appearance: Normal appearance. She is normal weight. She is not ill-appearing, toxic-appearing or diaphoretic.   HENT:      Head: Normocephalic and atraumatic.      Right Ear: External ear normal.      Left Ear: External ear normal.      Nose: Nose normal.      Mouth/Throat:      Mouth: Mucous membranes are moist.   Eyes:      General: Lids are normal.      Extraocular Movements: Extraocular movements intact.      Right eye: No nystagmus.      Left eye: No nystagmus.      Conjunctiva/sclera: Conjunctivae normal.      Pupils: Pupils are equal, round, and reactive to light.   Cardiovascular:      Rate and Rhythm: Normal rate.   Pulmonary:      Effort: Pulmonary effort is normal. No respiratory distress.      Breath sounds: No stridor.   Abdominal:      General: There is no distension.   Musculoskeletal:         General: No swelling or deformity.   Skin:     Capillary Refill: Capillary refill takes less than 2 seconds.      Findings: No rash.   Neurological:      General: No focal deficit present.      Mental Status: She is alert and oriented to person,  place, and time. Mental status is at baseline.      GCS: GCS eye subscore is 4. GCS verbal subscore is 5. GCS motor subscore is 6.      Sensory: Sensation is intact.      Motor: Motor function is intact.      Coordination: Coordination is intact.      Gait: Gait abnormal.      Comments: Ataxic    No nystagmus with Richmond-Hallpike maneuver.  Some improvement with Epley maneuver but no persistent resolution.   Psychiatric:         Mood and Affect: Mood normal.         Behavior: Behavior normal.         Thought Content: Thought content normal.         Judgment: Judgment normal.           ED Course & MDM   ED Course as of 02/07/25 1843 Fri Feb 07, 2025   1611 ALT(!): 69 [JF]      ED Course User Index  [JF] Solo Dodge PA-C         Diagnoses as of 02/07/25 1843   Dizziness   Vertigo   Abscess                 No data recorded     Zelda Coma Scale Score: 15 (02/07/25 1334 : Cameron Farias RN)                           Medical Decision Making  Differential diagnosis is basilar stroke, vertigo, Ménière's disease  Abscess for cellulitis of the right clavicle    Incision and drainage was performed of the abscess.    Seen with ED attending patient remains ataxic and dizzy states she went from a 11 out of 10 to a 7 out of 10 despite Antivert and Valium.  She remains ataxic.  Will plan to observe for further definitive care    CT scan reviewed by me and is unremarkable with my independent interpretation    Discussed with the obs team and utilization management.        Procedure  Incision and Drainage    Performed by: Solo Dodge PA-C  Authorized by: Ana Maria Osborne MD    Consent:     Consent obtained:  Verbal    Consent given by:  Patient    Risks discussed:  Pain  Universal protocol:     Procedure explained and questions answered to patient or proxy's satisfaction: yes      Immediately prior to procedure, a time out was called: yes      Patient identity confirmed:  Verbally with patient  Location:     Type:   Cyst    Size:  2cm  Pre-procedure details:     Skin preparation:  Povidone-iodine  Sedation:     Sedation type:  None  Anesthesia:     Anesthesia method:  Local infiltration and topical application    Local anesthetic:  Lidocaine 1% WITH epi  Procedure type:     Complexity:  Simple  Procedure details:     Incision types:  Single straight    Wound management:  Probed and deloculated    Drainage:  Bloody and serosanguinous    Drainage amount:  Scant    Wound treatment:  Wound left open    Packing materials:  None  Post-procedure details:     Procedure completion:  Tolerated well, no immediate complications  Comments:      Sebaceous material       Solo Dodge PA-C  02/07/25 1818       Solo Dodge PA-C  02/07/25 1840

## 2025-02-07 NOTE — H&P
History Of Present Illness  Edmond Cool is a 54 y.o. female presenting for dizziness. Was started on keflex 2/3/25 for abscess on her right clavicle. It was around this time (4 days ago) she started having the sensation of dizziness when she started taking the Keflex.  She reports on Monday she took 1 dose.  She took the doses on Tuesday, and only took 1 dose on Wednesday before she stopped due to the dizziness.  The dizziness however did not improve with stopping the Keflex.  Yesterday, Thursday, she started developing disequilibrium and difficulty ambulating due to balance.  It worsened through today, to the point she reports she was walking sideways to her left and a coworker had to put a chair behind her.  She denies any worsening factors for the dizziness, such as going from sitting to standing or turning her head too quickly.  Just reports the dizziness is always there with no aggravating factors      PMHX: HTN, HLD, DM, transaminitis, asthma.     Past Medical History  Past Medical History:   Diagnosis Date    Allergy to seafood 12/15/2022    History of allergy to shellfish    Candidiasis, unspecified 12/09/2022    Yeast infection    Encounter for contraceptive management, unspecified 08/09/2021    Contraception management    Encounter for gynecological examination (general) (routine) without abnormal findings 09/11/2020    Women's annual routine gynecological examination    Essential (primary) hypertension 05/23/2019    HTN (hypertension), benign    Other specified noninflammatory disorders of vagina 12/06/2021    Vaginal irritation    Personal history of other diseases of the musculoskeletal system and connective tissue 05/23/2019    History of herniated intervertebral disc    Personal history of other diseases of the respiratory system     History of sinusitis    Personal history of other endocrine, nutritional and metabolic disease 05/23/2019    History of hypercholesterolemia    Personal history  of other infectious and parasitic diseases 2021    History of trichomonal vaginitis    Personal history of other medical treatment 2021    History of screening mammography    Personal history of urinary (tract) infections     History of urinary tract infection       Surgical History  Past Surgical History:   Procedure Laterality Date    BREAST BIOPSY      OTHER SURGICAL HISTORY  2019     section        Social History  She reports that she quit smoking about 26 years ago. Her smoking use included cigarettes. She has never used smokeless tobacco. She reports current alcohol use. She reports that she does not use drugs.    Family History  Family History   Problem Relation Name Age of Onset    No Known Problems Mother      Diabetes Other      Hypertension Other      Cancer Other          Allergies  Bee venom protein (honey bee) and Dog dander    Review of Systems   Musculoskeletal:  Positive for gait problem.   Skin:  Positive for wound.   Neurological:  Positive for dizziness.        Physical Exam  Constitutional:       Appearance: Normal appearance.   Cardiovascular:      Rate and Rhythm: Normal rate and regular rhythm.      Pulses: Normal pulses.      Heart sounds: Normal heart sounds. No murmur heard.     No gallop.   Pulmonary:      Effort: Pulmonary effort is normal. No respiratory distress.      Breath sounds: Normal breath sounds. No wheezing or rhonchi.   Abdominal:      General: Abdomen is flat. Bowel sounds are normal. There is no distension.      Palpations: Abdomen is soft.      Tenderness: There is no abdominal tenderness. There is no guarding.   Skin:     General: Skin is warm.      Capillary Refill: Capillary refill takes less than 2 seconds.   Neurological:      Mental Status: She is alert and oriented to person, place, and time.      Cranial Nerves: No cranial nerve deficit, dysarthria or facial asymmetry.      Sensory: Sensation is intact.      Motor: Pronator drift present.  "     Coordination: Finger-Nose-Finger Test abnormal.      Comments: -LLE drift/ weakness   -Left sided hesitancy with finger to nose  -Required assistance when sitting up in chair stabilizing trunk- was leaning to left    Psychiatric:         Mood and Affect: Mood normal.         Thought Content: Thought content normal.         Judgment: Judgment normal.          Last Recorded Vitals  Blood pressure (!) 156/91, pulse 78, temperature 36.6 °C (97.8 °F), temperature source Temporal, resp. rate 17, height 1.575 m (5' 2\"), weight 72.6 kg (160 lb), SpO2 95%.    Relevant Results  Scheduled medications    Continuous medications    PRN medications      Results for orders placed or performed during the hospital encounter of 02/07/25 (from the past 24 hours)   CBC and Auto Differential   Result Value Ref Range    WBC 4.9 4.4 - 11.3 x10*3/uL    nRBC 0.0 0.0 - 0.0 /100 WBCs    RBC 4.87 4.00 - 5.20 x10*6/uL    Hemoglobin 14.3 12.0 - 16.0 g/dL    Hematocrit 41.1 36.0 - 46.0 %    MCV 84 80 - 100 fL    MCH 29.4 26.0 - 34.0 pg    MCHC 34.8 32.0 - 36.0 g/dL    RDW 13.2 11.5 - 14.5 %    Platelets 237 150 - 450 x10*3/uL    Neutrophils % 50.5 40.0 - 80.0 %    Immature Granulocytes %, Automated 0.4 0.0 - 0.9 %    Lymphocytes % 30.7 13.0 - 44.0 %    Monocytes % 12.5 2.0 - 10.0 %    Eosinophils % 4.9 0.0 - 6.0 %    Basophils % 1.0 0.0 - 2.0 %    Neutrophils Absolute 2.47 1.20 - 7.70 x10*3/uL    Immature Granulocytes Absolute, Automated 0.02 0.00 - 0.70 x10*3/uL    Lymphocytes Absolute 1.50 1.20 - 4.80 x10*3/uL    Monocytes Absolute 0.61 0.10 - 1.00 x10*3/uL    Eosinophils Absolute 0.24 0.00 - 0.70 x10*3/uL    Basophils Absolute 0.05 0.00 - 0.10 x10*3/uL   Comprehensive metabolic panel   Result Value Ref Range    Glucose 96 74 - 99 mg/dL    Sodium 139 136 - 145 mmol/L    Potassium 3.9 3.5 - 5.3 mmol/L    Chloride 103 98 - 107 mmol/L    Bicarbonate 28 21 - 32 mmol/L    Anion Gap 12 10 - 20 mmol/L    Urea Nitrogen 12 6 - 23 mg/dL    Creatinine " 0.68 0.50 - 1.05 mg/dL    eGFR >90 >60 mL/min/1.73m*2    Calcium 9.6 8.6 - 10.3 mg/dL    Albumin 4.4 3.4 - 5.0 g/dL    Alkaline Phosphatase 150 (H) 33 - 110 U/L    Total Protein 7.6 6.4 - 8.2 g/dL    AST 58 (H) 9 - 39 U/L    Bilirubin, Total 0.3 0.0 - 1.2 mg/dL    ALT 69 (H) 7 - 45 U/L   Magnesium   Result Value Ref Range    Magnesium 1.96 1.60 - 2.40 mg/dL     *Note: Due to a large number of results and/or encounters for the requested time period, some results have not been displayed. A complete set of results can be found in Results Review.       CT head wo IV contrast    Result Date: 2/7/2025  Interpreted By:  Luly Skelton, STUDY: CT HEAD WO IV CONTRAST;  2/7/2025 5:03 pm   INDICATION: Signs/Symptoms:dizziness.     COMPARISON: None.   ACCESSION NUMBER(S): [id number]   ORDERING CLINICIAN: LULY PEREZ   TECHNIQUE: Noncontrast axial CT scan of head was performed. Angled reformats in brain and bone windows were generated. The images were reviewed in bone, brain, blood and soft tissue windows.   FINDINGS: CSF Spaces: The ventricles, sulci and basal cisterns are within normal limits. There is no extraaxial fluid collection.   Parenchyma:  The grey-white differentiation is intact. There is no mass effect or midline shift.  There is no intracranial hemorrhage.   Calvarium: The calvarium is unremarkable.   Paranasal sinuses and mastoids: Visualized paranasal sinuses and mastoids are clear.       No evidence of acute cortical infarct or intracranial hemorrhage.   If persistent concern, consider MRI   MACRO: None   Signed by: Luly Skelton 2/7/2025 6:04 PM Dictation workstation:   [id number]        Assessment/Plan   Assessment & Plan    Edmond Cool is a 54 y.o. female presenting for dizziness. Was started on keflex 2/3/25 for abscess on her right clavicle. It was around this time (4 days ago) she started having the sensation of dizziness when she started taking the Keflex.  She reports on Monday she took 1  dose.  She took the doses on Tuesday, and only took 1 dose on Wednesday before she stopped due to the dizziness.  The dizziness however did not improve with stopping the Keflex.  Yesterday (Thursday)  she started developing disequilibrium and difficulty ambulating due to balance.  It worsened through today, to the point she reports she was walking sideways to her left and a coworker had to put a chair behind her.  She denies any worsening factors for the dizziness, such as going from sitting to standing or turning her head too quickly.  Just reports the dizziness is always there with no aggravating factors      PMHX: HTN, HLD, DM, transaminitis, asthma.     Dizziness  Left sided weakness   -MRI brain  -CTA head and neck   -CT head neg  -echo with bubble study  -lipid panel  -telemetry  -neuro consult pending MRI  -Q4 neuro checks  -already on statin, add aspirin   -out of window for permissive HTN given symptoms started 2-4 days ago  -Left sided weakness on exam concerning for infarct rather than vertigo, out of window for TNK-> neuro symptoms on exam LLE drift/ weakness   -Left sided hesitancy with finger to nose   -Required assistance when sitting up  in chair stabilizing trunk- was leaning to left     Abscess  -s/p I&D in ED  -ancef  -no leukocytosis, a febrile    HTN  -resume home meds  -out of window for permissive HTN    DM  -Accu-Cheks ACHS  -Humalog correctional sliding scale      Elevated LFT's  -chronic  -follow with PCP    Asthma  -no respiratry concerns or complaints at this time  -can add nebulizers if needed    DVT prophylaxis:  Lovenox, SCD    DC plan:  Pending PT/OT    Labs/Testing reviewed    Interdisciplinary team rounding completed with hospitalist, nurse, TCC    NP discussed plan and lab/testing results with Dr. Mckeon     I spent 45 minutes in the professional and overall care of this patient.      Serenity Peñaloza, APRN-CNP

## 2025-02-07 NOTE — HOSPITAL COURSE
Presenting for dizziness. Was started on keflex 2/3/25 for cellulitis/abscess. It was around this time (4 days ago) she started having the sensation of dizziness and disequilibrium.       PMHX: HTN, HLD, DM, transaminitis, asthma,         .Stroke like symptoms  -MRI brain  -CTA head and neck   -CT head neg  -echo with bubble study  -lipid panel  -telemetry  -neuro consult pending MRI  -Q4 neuro checks  -already on statin, add aspirin   -out of window for permissive HTN given symptoms started 2-4 days ago  -Left sided weakness on exam concerning for infarct rather than vertigo, out of window for TNK    HTN  -resume home meds  -out of window for permissive HTN    Abscess  -s/p I&D in ED  -ancef  -no leukocytosis, a febrile    Elevated LFT's  -chronic  -follow with PCP    Asthma  -no respiratry concerns or complaints at this time  -can add nebulizers if needed    DVT prophylaxis:  Lovenox, SCD    DC plan:  Pending PT/OT    Labs/Testing reviewed    Interdisciplinary team rounding completed with hospitalist, nurse, TCC    NP discussed plan and lab/testing results with Dr. Mckeon

## 2025-02-07 NOTE — ED TRIAGE NOTES
Pt arrives to ED from home for dizziness and feeling lightheaded for about 4 days. Pt states it started when she was put on Keflex for an abscess on her right chest. Pt states she has been over the last 4 days having a spinning feeling and feels unbalanced like she cannot walk correctly. Pt reports also having some headache over the last few days.

## 2025-02-07 NOTE — ED TRIAGE NOTES
As provider-in-triage, I performed a medical screening history and physical exam on this patient.  HISTORY OF PRESENT ILLNESS  (include at least one item)     Very pleasant 54-year-old female has had dizziness and lightheadedness since starting Keflex for an abscess in the right clavicular region she states she had inflammation there for years but it became infected over the past few days.  She is diabetic diet controlled.  States she feels like she is spinning and off-balance when walking.  No history of vertigo.    PHYSICAL EXAM  Vital Signs reviewed.  (include at least one additional item)     Sitting comfortably   no nystagmus   a cellulitic pustule on the right clavicular area      MDM  (describe briefly what was initiated or planned)    Let for the pustule that would benefit from being drained.  A cardiac workup to evaluate for metabolic abnormalities causing her dizziness I also ordered Antivert for her dizziness.      I evaluated this patient in triage with the RN. Due to the patients complaint labs and or imaging were ordered by myself in an attempt to expedite patient care however I am not participating in care after evaluation. This is a preliminary assessment. Pt does not appear in acute distress at this time. They will have a full evaluation as soon as possible. They will be cared for by another provider who will possibly order more labs, imaging or interventions. Pt did not have a full ROS or PE completed by myself however below is a summary with reasons for orders.  For the remainder of the patient's workup and ED course, please refer to the main ED provider note. We discussed need for diagnostic testing including laboratory studies and imaging.  We also discussed that patient may be asked to wait in the waiting room while these tests are pending.  They understand that if they choose to leave without having the testing completed or resulted that we cannot rule out acute life threatening illnesses and  the risks involved could lead to worsening condition, permanent disability or even death.

## 2025-02-07 NOTE — LETTER
February 8, 2025     Patient: Edmond Cool   YOB: 1970   Date of Visit: 2/7/2025       To Whom It May Concern:    Edmond Cool was seen at [facility] from 2/7/2025 - 2/8/2025. Please excuse Edmond for her absence from work through 2/11/2025. Edmond may return to work on Wednesday 2/12/2025.    If you have any questions or concerns, please don't hesitate to call.         Sincerely,   Roxy Swift PA-C      No name on file        CC: No Recipients

## 2025-02-08 ENCOUNTER — APPOINTMENT (OUTPATIENT)
Dept: RADIOLOGY | Facility: HOSPITAL | Age: 55
End: 2025-02-08
Payer: COMMERCIAL

## 2025-02-08 ENCOUNTER — APPOINTMENT (OUTPATIENT)
Dept: CARDIOLOGY | Facility: HOSPITAL | Age: 55
End: 2025-02-08
Payer: COMMERCIAL

## 2025-02-08 VITALS
HEART RATE: 71 BPM | OXYGEN SATURATION: 98 % | HEIGHT: 62 IN | BODY MASS INDEX: 29.44 KG/M2 | TEMPERATURE: 97.9 F | DIASTOLIC BLOOD PRESSURE: 79 MMHG | SYSTOLIC BLOOD PRESSURE: 139 MMHG | WEIGHT: 160 LBS | RESPIRATION RATE: 18 BRPM

## 2025-02-08 LAB
ANION GAP SERPL CALC-SCNC: 10 MMOL/L (ref 10–20)
AORTIC VALVE MEAN GRADIENT: 4 MMHG
AORTIC VALVE PEAK VELOCITY: 1.33 M/S
AV PEAK GRADIENT: 7 MMHG
AVA (PEAK VEL): 2.37 CM2
AVA (VTI): 2.3 CM2
BODY SURFACE AREA: 1.78 M2
BUN SERPL-MCNC: 11 MG/DL (ref 6–23)
CALCIUM SERPL-MCNC: 8.9 MG/DL (ref 8.6–10.3)
CHLORIDE SERPL-SCNC: 106 MMOL/L (ref 98–107)
CO2 SERPL-SCNC: 26 MMOL/L (ref 21–32)
CREAT SERPL-MCNC: 0.53 MG/DL (ref 0.5–1.05)
EGFRCR SERPLBLD CKD-EPI 2021: >90 ML/MIN/1.73M*2
EJECTION FRACTION APICAL 4 CHAMBER: 59.6
EJECTION FRACTION: 58 %
ERYTHROCYTE [DISTWIDTH] IN BLOOD BY AUTOMATED COUNT: 13.3 % (ref 11.5–14.5)
EST. AVERAGE GLUCOSE BLD GHB EST-MCNC: 126 MG/DL
GLUCOSE BLD MANUAL STRIP-MCNC: 119 MG/DL (ref 74–99)
GLUCOSE SERPL-MCNC: 85 MG/DL (ref 74–99)
HBA1C MFR BLD: 6 %
HCT VFR BLD AUTO: 42.1 % (ref 36–46)
HGB BLD-MCNC: 14.1 G/DL (ref 12–16)
LEFT ATRIUM VOLUME AREA LENGTH INDEX BSA: 22.3 ML/M2
LEFT VENTRICLE INTERNAL DIMENSION DIASTOLE: 4.24 CM (ref 3.5–6)
LEFT VENTRICULAR OUTFLOW TRACT DIAMETER: 1.88 CM
MCH RBC QN AUTO: 29.2 PG (ref 26–34)
MCHC RBC AUTO-ENTMCNC: 33.5 G/DL (ref 32–36)
MCV RBC AUTO: 87 FL (ref 80–100)
MITRAL VALVE E/A RATIO: 0.79
NRBC BLD-RTO: 0 /100 WBCS (ref 0–0)
PLATELET # BLD AUTO: 218 X10*3/UL (ref 150–450)
POTASSIUM SERPL-SCNC: 4 MMOL/L (ref 3.5–5.3)
RBC # BLD AUTO: 4.83 X10*6/UL (ref 4–5.2)
RIGHT VENTRICLE FREE WALL PEAK S': 13.4 CM/S
RIGHT VENTRICLE PEAK SYSTOLIC PRESSURE: 26.5 MMHG
SODIUM SERPL-SCNC: 138 MMOL/L (ref 136–145)
TRICUSPID ANNULAR PLANE SYSTOLIC EXCURSION: 1.7 CM
WBC # BLD AUTO: 4.3 X10*3/UL (ref 4.4–11.3)

## 2025-02-08 PROCEDURE — 85027 COMPLETE CBC AUTOMATED: CPT | Performed by: NURSE PRACTITIONER

## 2025-02-08 PROCEDURE — 80048 BASIC METABOLIC PNL TOTAL CA: CPT | Performed by: NURSE PRACTITIONER

## 2025-02-08 PROCEDURE — 2500000001 HC RX 250 WO HCPCS SELF ADMINISTERED DRUGS (ALT 637 FOR MEDICARE OP): Performed by: NURSE PRACTITIONER

## 2025-02-08 PROCEDURE — 97165 OT EVAL LOW COMPLEX 30 MIN: CPT | Mod: GO

## 2025-02-08 PROCEDURE — 2500000004 HC RX 250 GENERAL PHARMACY W/ HCPCS (ALT 636 FOR OP/ED): Performed by: NURSE PRACTITIONER

## 2025-02-08 PROCEDURE — 97112 NEUROMUSCULAR REEDUCATION: CPT | Mod: GP | Performed by: PHYSICAL THERAPIST

## 2025-02-08 PROCEDURE — 93306 TTE W/DOPPLER COMPLETE: CPT | Performed by: INTERNAL MEDICINE

## 2025-02-08 PROCEDURE — 70544 MR ANGIOGRAPHY HEAD W/O DYE: CPT

## 2025-02-08 PROCEDURE — 70544 MR ANGIOGRAPHY HEAD W/O DYE: CPT | Performed by: STUDENT IN AN ORGANIZED HEALTH CARE EDUCATION/TRAINING PROGRAM

## 2025-02-08 PROCEDURE — G0378 HOSPITAL OBSERVATION PER HR: HCPCS

## 2025-02-08 PROCEDURE — 93306 TTE W/DOPPLER COMPLETE: CPT

## 2025-02-08 PROCEDURE — 96366 THER/PROPH/DIAG IV INF ADDON: CPT | Mod: 59

## 2025-02-08 PROCEDURE — 99239 HOSP IP/OBS DSCHRG MGMT >30: CPT

## 2025-02-08 PROCEDURE — 2500000002 HC RX 250 W HCPCS SELF ADMINISTERED DRUGS (ALT 637 FOR MEDICARE OP, ALT 636 FOR OP/ED): Performed by: NURSE PRACTITIONER

## 2025-02-08 PROCEDURE — 36415 COLL VENOUS BLD VENIPUNCTURE: CPT | Performed by: NURSE PRACTITIONER

## 2025-02-08 PROCEDURE — 82947 ASSAY GLUCOSE BLOOD QUANT: CPT

## 2025-02-08 PROCEDURE — 97161 PT EVAL LOW COMPLEX 20 MIN: CPT | Mod: GP | Performed by: PHYSICAL THERAPIST

## 2025-02-08 RX ORDER — MECLIZINE HYDROCHLORIDE 25 MG/1
25 TABLET ORAL 3 TIMES DAILY PRN
Qty: 15 TABLET | Refills: 0 | Status: SHIPPED | OUTPATIENT
Start: 2025-02-08 | End: 2025-02-13

## 2025-02-08 RX ADMIN — CEFAZOLIN SODIUM 1 G: 1 INJECTION, SOLUTION INTRAVENOUS at 02:39

## 2025-02-08 RX ADMIN — FLUTICASONE PROPIONATE 1 SPRAY: 50 SPRAY, METERED NASAL at 12:53

## 2025-02-08 RX ADMIN — POLYETHYLENE GLYCOL 3350 17 G: 17 POWDER, FOR SOLUTION ORAL at 09:40

## 2025-02-08 RX ADMIN — CEFAZOLIN SODIUM 1 G: 1 INJECTION, SOLUTION INTRAVENOUS at 12:23

## 2025-02-08 RX ADMIN — ASPIRIN 81 MG: 81 TABLET, COATED ORAL at 09:28

## 2025-02-08 RX ADMIN — AMLODIPINE BESYLATE 5 MG: 5 TABLET ORAL at 09:28

## 2025-02-08 RX ADMIN — PANTOPRAZOLE SODIUM 40 MG: 40 TABLET, DELAYED RELEASE ORAL at 06:06

## 2025-02-08 ASSESSMENT — PAIN - FUNCTIONAL ASSESSMENT
PAIN_FUNCTIONAL_ASSESSMENT: 0-10

## 2025-02-08 ASSESSMENT — COGNITIVE AND FUNCTIONAL STATUS - GENERAL
CLIMB 3 TO 5 STEPS WITH RAILING: A LITTLE
MOBILITY SCORE: 23
DRESSING REGULAR LOWER BODY CLOTHING: A LITTLE
DAILY ACTIVITIY SCORE: 22
HELP NEEDED FOR BATHING: A LITTLE

## 2025-02-08 ASSESSMENT — ACTIVITIES OF DAILY LIVING (ADL)
ADL_ASSISTANCE: INDEPENDENT
LACK_OF_TRANSPORTATION: NO

## 2025-02-08 ASSESSMENT — PAIN SCALES - GENERAL
PAINLEVEL_OUTOF10: 0 - NO PAIN

## 2025-02-08 NOTE — PROGRESS NOTES
Occupational Therapy    Evaluation    Patient Name: Edmond Cool  MRN: [id number]  Department: [place] [id number]  Room: [address] [place] [address]  Today's Date: 2/8/2025  Time Calculation  Start Time: 1052  Stop Time: 1113  Time Calculation (min): 21 min        Assessment:  OT Assessment: Pt pleasant, agreeable to OT/PT eval. Pt presenting with decreased ADLs/IADLs d/t increased dizziness. Pt would benefit from one additional OT session for equipment recommendations.  Prognosis: Good  Barriers to Discharge Home: No anticipated barriers  Evaluation/Treatment Tolerance: Patient tolerated treatment well  End of Session Patient Position: Bed, 2 rail up  OT Assessment Results: Decreased ADL status, Decreased functional mobility  Prognosis: Good  Barriers to Discharge: None  Evaluation/Treatment Tolerance: Patient tolerated treatment well  Strengths: Ability to acquire knowledge  Barriers to Participation:  (none)  Plan:  Treatment Interventions: ADL retraining, Endurance training, Equipment evaluation/education, Functional transfer training  OT Frequency: One time follow up visit  OT Discharge Recommendations: Low intensity level of continued care  OT Recommended Transfer Status: Stand by assist, Assist of 1  OT - OK to Discharge: Yes (Per OT POC)  Treatment Interventions: ADL retraining, Endurance training, Equipment evaluation/education, Functional transfer training    Subjective   Current Problem:  1. Dizziness  Transthoracic Echo (TTE) Complete    Transthoracic Echo (TTE) Complete      2. Vertigo        3. Abscess          General:  General  Reason for Referral: 55 y/o F to ED with dizziness, disequilibrium, unsteady gait, abd impaired balance.  Referred By: Serenity Bustamante, APRN-CNP  Past Medical History Relevant to Rehab:   Past Medical History:   Diagnosis Date    Allergy to seafood 12/15/2022    History of allergy to shellfish    Candidiasis, unspecified 12/09/2022    Yeast infection    Encounter for contraceptive management,  unspecified 08/09/2021    Contraception management    Encounter for gynecological examination (general) (routine) without abnormal findings 09/11/2020    Women's annual routine gynecological examination    Essential (primary) hypertension 05/23/2019    HTN (hypertension), benign    Other specified noninflammatory disorders of vagina 12/06/2021    Vaginal irritation    Personal history of other diseases of the musculoskeletal system and connective tissue 05/23/2019    History of herniated intervertebral disc    Personal history of other diseases of the respiratory system     History of sinusitis    Personal history of other endocrine, nutritional and metabolic disease 05/23/2019    History of hypercholesterolemia    Personal history of other infectious and parasitic diseases 12/13/2021    History of trichomonal vaginitis    Personal history of other medical treatment 05/20/2021    History of screening mammography    Personal history of urinary (tract) infections     History of urinary tract infection       Family/Caregiver Present: No  Co-Treatment: OT  Co-Treatment Reason: High priority due to OBS status  Prior to Session Communication: Bedside nurse  Patient Position Received: Bed, 2 rail up, Alarm off, not on at start of session  Preferred Learning Style: auditory, verbal  General Comment: No barriers noted to mobility in chart or by nurse; pt agreeable to therapy evals.  Precautions:  Medical Precautions: No known precautions/limitation    Pain:  Pain Assessment  Pain Assessment: 0-10  0-10 (Numeric) Pain Score: 0 - No pain    Objective   Cognition:  Overall Cognitive Status: Within Functional Limits  Orientation Level: Oriented X4        Home Living:  Type of Home: House  Lives With: Spouse  Home Adaptive Equipment: None  Home Layout: Two level  Home Access: Stairs to enter with rails  Entrance Stairs-Rails: Both  Entrance Stairs-Number of Steps: 2  Bathroom Shower/Tub: Tub/shower unit  Bathroom Toilet:  Standard  Bathroom Equipment: Grab bars in shower  Prior Function:  Level of [place]: Independent with ADLs and functional transfers, Independent with homemaking with ambulation  Vocational: Full time employment  Hand Dominance: Right  IADL History:  Homemaking Responsibilities: Yes  Meal Prep Responsibility: Primary  Laundry Responsibility: Primary  Cleaning Responsibility: Primary  Bill Paying/Finance Responsibility: Primary  Shopping Responsibility: Primary  Homemaking Comments: Pt reports fully IND and shares homemaking tasks with spouse.  Current License: Yes  Occupation: Full time employment  Type of Occupation: Nurse    Activity Tolerance:  Endurance: Tolerates 10 - 20 min exercise with multiple rests  Bed Mobility/Transfers: Bed Mobility  Bed Mobility: Yes  Bed Mobility 1  Bed Mobility 1: Supine to sitting  Level of Assistance 1: Close supervision  Bed Mobility 2  Bed Mobility  2: Sitting to supine  Level of Assistance 2: Close supervision    Transfers  Transfer: Yes  Transfer 1  Transfer From 1: Sit to  Transfer to 1: Stand  Technique 1: Sit to stand  Transfer Level of Assistance 1: Close supervision  Transfers 2  Transfer From 2: Stand to  Transfer to 2: Sit  Technique 2: Stand to sit  Transfer Level of Assistance 2: Close supervision      Functional Mobility:  Functional Mobility  Functional Mobility Performed: Yes  Functional Mobility 1  Surface 1: Level tile  Device 1: No device  Assistance 1: Close supervision  Sitting Balance:  Static Sitting Balance  Static Sitting-Balance Support: Feet supported  Static Sitting-Level of Assistance: Distant supervision  Dynamic Sitting Balance  Dynamic Sitting-Balance Support: Feet supported  Dynamic Sitting-Level of Assistance: Close supervision  Standing Balance:  Static Standing Balance  Static Standing-Balance Support: No upper extremity supported  Static Standing-Level of Assistance: Close supervision  Dynamic Standing Balance  Dynamic Standing-Balance  Support: No upper extremity supported  Dynamic Standing-Level of Assistance: Close supervision      Vision:Vision - Basic Assessment  Current Vision: No visual deficits       Extremities: RUE   RUE : Within Functional Limits, LUE   LUE: Within Functional Limits, RLE   RLE : Within Functional Limits, and LLE   LLE : Within Functional Limits      Outcome Measures:[facility] Daily Activity  Putting on and taking off regular lower body clothing: A little  Bathing (including washing, rinsing, drying): A little  Putting on and taking off regular upper body clothing: None  Toileting, which includes using toilet, bedpan or urinal: None  Taking care of personal grooming such as brushing teeth: None  Eating Meals: None  Daily Activity - Total Score: 22    Education Documentation  ADL Training, taught by Dea Lux OT at 2/8/2025  1:20 PM.  Learner: Patient  Readiness: Acceptance  Method: Explanation, Demonstration  Response: Verbalizes Understanding      Goals:  Encounter Problems       Encounter Problems (Active)       ADLs       Patient will perform UB and LB bathing with modified independent level of assistance and shower chair.       Start:  02/08/25    Expected End:  02/22/25            Patient with complete lower body dressing with stand by assist level of assistance donning and doffing all LE clothes  with PRN adaptive equipment while edge of bed        Start:  02/08/25    Expected End:  02/22/25               BALANCE       Pt will maintain dynamic standing balance during ADL task with supervision level of assistance in order to demonstrate decreased risk of falling and improved postural control.       Start:  02/08/25    Expected End:  02/22/25

## 2025-02-08 NOTE — PROGRESS NOTES
02/08/25 0938   Discharge Planning   Living Arrangements Spouse/significant other   Support Systems Spouse/significant other   Type of Residence Private residence   Number of Stairs to Enter Residence 2   Number of Stairs Within Residence 10   Do you have animals or pets at home? No   Home or Post Acute Services None   Expected Discharge Disposition Home   Does the patient need discharge transport arranged? Yes   RoundTrip coordination needed? Yes   Financial Resource Strain   How hard is it for you to pay for the very basics like food, housing, medical care, and heating? Not hard   Housing Stability   In the last 12 months, was there a time when you were not able to pay the mortgage or rent on time? N   In the past 12 months, how many times have you moved where you were living? 0   At any time in the past 12 months, were you homeless or living in a shelter (including now)? N   Transportation Needs   In the past 12 months, has lack of transportation kept you from medical appointments or from getting medications? no   In the past 12 months, has lack of transportation kept you from meetings, work, or from getting things needed for daily living? No   Intensity of Service   Intensity of Service 0-30 min     Met with patient at bedside. Patient lives at home with .  Patient reports being completely independent with all ADLs prior to admission. Patient reports no o2, HD or assistive devices used.  No discharge or social needs identified. Patient will discharge home with no needs when medically ready.

## 2025-02-08 NOTE — CARE PLAN
The patient's goals for the shift include      The clinical goals for the shift include pt will remain hds        Problem: General Stroke  Goal: Establish a mutual long term goal with patient by discharge  Outcome: Progressing  Goal: Demonstrate improvement in neurological exam throughout the shift  Outcome: Progressing  Goal: Maintain BP within ordered limits throughout shift  Outcome: Progressing  Goal: Participate in treatment (ie., meds, therapy) throughout shift  Outcome: Progressing  Goal: No symptoms of aspiration throughout shift  Outcome: Progressing  Goal: No symptoms of hemorrhage throughout shift  Outcome: Progressing  Goal: Tolerate enteral feeding throughout shift  Outcome: Progressing  Goal: Decreased nausea/vomiting throughout shift  Outcome: Progressing  Goal: Controlled blood glucose throughout shift  Outcome: Progressing  Goal: Out of bed three times today  Outcome: Progressing     Problem: ICU Stroke  Goal: Maintain ICP within ordered limits throughout shift  Outcome: Progressing  Goal: Tolerate EVD clamping trial throughout shift  Outcome: Progressing  Goal: Tolerate ventilator weaning trial during shift  Outcome: Progressing  Goal: Maintain patent airway throughout shift  Outcome: Progressing  Goal: Achieve/maintain targeted sodium level throughout shift  Outcome: Progressing     Problem: Pain - Adult  Goal: Verbalizes/displays adequate comfort level or baseline comfort level  Outcome: Progressing     Problem: Safety - Adult  Goal: Free from fall injury  Outcome: Progressing     Problem: Discharge Planning  Goal: Discharge to home or other facility with appropriate resources  Outcome: Progressing     Problem: Chronic Conditions and Co-morbidities  Goal: Patient's chronic conditions and co-morbidity symptoms are monitored and maintained or improved  Outcome: Progressing     Problem: Nutrition  Goal: Nutrient intake appropriate for maintaining nutritional needs  Outcome: Progressing     Problem:  Diabetes  Goal: Achieve decreasing blood glucose levels by end of shift  Outcome: Progressing  Goal: Increase stability of blood glucose readings by end of shift  Outcome: Progressing  Goal: Decrease in ketones present in urine by end of shift  Outcome: Progressing  Goal: Maintain electrolyte levels within acceptable range throughout shift  Outcome: Progressing  Goal: Maintain glucose levels >70mg/dl to <250mg/dl throughout shift  Outcome: Progressing  Goal: No changes in neurological exam by end of shift  Outcome: Progressing  Goal: Learn about and adhere to nutrition recommendations by end of shift  Outcome: Progressing  Goal: Vital signs within normal range for age by end of shift  Outcome: Progressing  Goal: Increase self care and/or family involovement by end of shift  Outcome: Progressing  Goal: Receive DSME education by end of shift  Outcome: Progressing

## 2025-02-08 NOTE — DISCHARGE SUMMARY
"Discharge Diagnosis  Dizziness    Issues Requiring Follow-Up  PCP    Discharge Meds     Medication List      START taking these medications     doxycycline 100 mg tablet; Commonly known as: Vibra-Tabs; Take 1 tablet   (100 mg) by mouth 2 times a day for 7 days. Take with a full glass of   water and do not lie down for at least 30 minutes after.   meclizine 25 mg tablet; Commonly known as: Antivert; Take 1 tablet (25   mg) by mouth 3 times a day as needed for dizziness for up to 5 days.     CONTINUE taking these medications     Accu-Chek Guide Glucose Meter misc; Generic drug: blood-glucose meter;   Use to check glucose as directed   Accu-Chek Guide test strips strip; Generic drug: blood sugar diagnostic;   Use as directed to test sugars up to 3 times daily   Accu-Chek Softclix Lancets misc; Generic drug: lancets; Use 1 new lancet   to test blood sugar 3 times a day   albuterol 90 mcg/actuation inhaler; INHALE 2 PUFFS BY MOUTH EVERY 4   HOURS AS NEEDED FOR WHEEZING   Alcohol Prep Pads pads, medicated; Generic drug: alcohol swabs; Use as   directed for diabetes care   amLODIPine 5 mg tablet; Commonly known as: Norvasc; Take 1 tablet (5 mg)   by mouth once daily.   azelastine 137 mcg (0.1 %) nasal spray; Commonly known as: Astelin;   Administer 1 spray into each nostril 2 times a day. Use in each nostril as   directed   BD Ultra-Fine April Pen Needle 32 gauge x 5/32\" needle; Generic drug: pen   needle, diabetic   fluticasone 50 mcg/actuation nasal spray; Commonly known as: Flonase;   USE 1 SPRAY IN EACH NOSTRIL EVERY 12 HOURS   fluticasone propion-salmeteroL 500-50 mcg/dose diskus inhaler; Commonly   known as: Advair Diskus; Inhale 1 puff 2 times a day. Rinse mouth with   water after use to reduce aftertaste and incidence of candidiasis. Do not   swallow.   magnesium 30 mg tablet   meloxicam 15 mg tablet; Commonly known as: Mobic; Take 1 tablet (15 mg)   by mouth once daily.   montelukast 10 mg tablet; Commonly known " as: Singulair; Take 1 tablet   (10 mg) by mouth once daily at bedtime.   * Ozempic 1 mg/dose (4 mg/3 mL) pen injector; Generic drug: semaglutide;   Inject 1 mg under the skin every 7 days.   * Ozempic 0.25 mg or 0.5 mg (2 mg/3 mL) pen injector; Generic drug:   semaglutide; Inject 0.5 mg under the skin every 7 days.   pantoprazole 40 mg EC tablet; Commonly known as: ProtoNix; TAKE 1 TABLET   BY MOUTH ONCE DAILY   PARoxetine 10 mg tablet; Commonly known as: Paxil; Take 1 tablet (10 mg)   by mouth once daily in the morning.   rosuvastatin 20 mg tablet; Commonly known as: Crestor; TAKE 1 TABLET BY   MOUTH ONCE DAILY   Tezspire SubQ Pen Injector; Generic drug: tezepelumab-ekko; Inject 1 pen   (210 mg) under the skin every 28 (twenty-eight) days.   vitamin E 45 mg (100 unit) capsule  * This list has 2 medication(s) that are the same as other medications   prescribed for you. Read the directions carefully, and ask your doctor or   other care provider to review them with you.       Test Results Pending At Discharge  Pending Labs       Order Current Status    Hemoglobin A1C In process            Hospital Course  Edmond Cool is a 54 y.o. female presenting for dizziness. Was started on keflex 2/3/25 for abscess on her right clavicle. It was around this time (4 days ago) she started having the sensation of dizziness when she started taking the Keflex. She reports on Monday she took 1 dose. She took the doses on Tuesday, and only took 1 dose on Wednesday before she stopped due to the dizziness. The dizziness however did not improve with stopping the Keflex. Thursday she started developing disequilibrium and difficulty ambulating due to balance. She denies any aggravating factors for the dizziness, such as going from sitting to standing or turning her head too quickly.      Dizziness  - Possibly secondary to keflex   - CT head negative  - CTA head and neck with narrowing of basilar artery  - MRI brain with no evidence of  acute infarct  - MRA head with no significant cranial stenosis or vessel occlusion. The basilar artery is diminutive in caliber secondary to fetal origin of the bilateral posterior cerebral arteries, normal variant   - echo with bubble study negative  - lipid panel with LDL 53 and HDL 57.1  - Continue statin  - Patient discharged with prescription for meclizine   - Instructed to take Doxycycline instead of Keflex  - Referral for vestibular therapy sent  - Follow up with PCP    Abscess  - s/p I&D in ED  - No leukocytosis, a febrile  - Instructed to take Doxycycline for 5 days  - Follow up with PCP    Disposition  - Patient discharged home in stable condition. Prescription for meclizine sent.  Instructed to take Doxycycline instead of Keflex. Patient to follow up with PCP. Referral for vestibular therapy sent.     Pertinent Physical Exam At Time of Discharge  Physical Exam  Vitals reviewed.   Constitutional:       General: She is not in acute distress.     Appearance: She is not ill-appearing.   Cardiovascular:      Rate and Rhythm: Normal rate and regular rhythm.      Pulses: Normal pulses.      Heart sounds: Normal heart sounds. No murmur heard.  Pulmonary:      Effort: Pulmonary effort is normal. No respiratory distress.      Breath sounds: Normal breath sounds.   Abdominal:      General: Abdomen is flat.      Palpations: Abdomen is soft.   Musculoskeletal:      Right lower leg: No edema.      Left lower leg: No edema.   Neurological:      Mental Status: She is alert.      Motor: No weakness (Strength equal in bilateral upper and lower extremities).   Psychiatric:         Mood and Affect: Mood normal.         Behavior: Behavior normal.       Outpatient Follow-Up  Future Appointments   Date Time Provider Department Center   2/11/2025  7:30 AM John He PharmD [id number] [facility]   2/11/2025  1:00 PM David Skelton MD [id number] [facility]   3/19/2025  8:30 AM Luis Mcclendon MD [id number] [facility]   5/19/2025  2:15  PM Roselyn Del Angel, APRN-CNP [id number] [facility]     Interdisciplinary team rounding completed with hospitalist, nurse, TCC  Discussed plan and lab/testing results with Dr. Mike Swift PA-C  2/8/2025  2:21 PM

## 2025-02-08 NOTE — PROGRESS NOTES
"Pharmacy Medication History     Source of Information: Per Patient and  bedside. Patient stated she does not take Spiriva because of the price. Patient on both ozempic it very's, patient hasn't started Doxycyline 100 mg tab, Tezepelumab- ekko SubQ Pen injector or Paroxetine 10 mg tab.     Additional concerns with the patient's PTA list.   N/A  The following updates were made to the Prior to Admission medication list:     Medications ADDED:   N/A  Medications CHANGED:  N/A  Medications REMOVED:   N/A  Medications NOT TAKING:   Sodium, potassium, mag sulfate 17.5- 3.13-1.6 gram recon solution   Spiriva 2.5 mcg/ actuation inhaler     Allergy reviewed : Yes    Meds 2 Beds : Yes    Outpatient pharmacy confirmed and updated in chart : Yes    [facility]    The list below reflectives the updated PTA list. Please review each medication in order reconciliation for additional clarification and justification.    Prior to Admission Medications   Prescriptions Last Dose Informant Patient Reported? Taking?   BD Ultra-Fine April Pen Needle 32 gauge x 5/32\" needle   Yes No   PARoxetine (Paxil) 10 mg tablet   No No   Sig: Take 1 tablet (10 mg) by mouth once daily in the morning.   albuterol 2.5 mg /3 mL (0.083 %) nebulizer solution Past Month  No Yes   Sig: Take 3 mL (2.5 mg) by nebulization every 6 hours if needed for wheezing.   albuterol 90 mcg/actuation inhaler Past Week  No Yes   Sig: INHALE 2 PUFFS BY MOUTH EVERY 4 HOURS AS NEEDED FOR WHEEZING   alcohol swabs (Alcohol Pads) pads, medicated   No No   Sig: Use as directed for diabetes care   amLODIPine (Norvasc) 5 mg tablet 2/6/2025 Morning  No Yes   Sig: Take 1 tablet (5 mg) by mouth once daily.   azelastine (Astelin) 137 mcg (0.1 %) nasal spray 2/6/2025  No Yes   Sig: Administer 1 spray into each nostril 2 times a day. Use in each nostril as directed   blood sugar diagnostic (Accu-Chek Guide test strips) strip   No No   Sig: Use as directed to test sugars up " to 3 times daily   blood-glucose meter (Accu-Chek Guide Glucose Meter) misc   No No   Sig: Use to check glucose as directed   doxycycline (Vibra-Tabs) 100 mg tablet   No No   Sig: Take 1 tablet (100 mg) by mouth 2 times a day for 7 days. Take with a full glass of water and do not lie down for at least 30 minutes after.   fluticasone (Flonase) 50 mcg/actuation nasal spray 2/5/2025  No No   Sig: USE 1 SPRAY IN EACH NOSTRIL EVERY 12 HOURS             fluticasone propion-salmeteroL (Advair Diskus) 500-50 mcg/dose diskus inhaler Past Month  No Yes   Sig: Inhale 1 puff 2 times a day. Rinse mouth with water after use to reduce aftertaste and incidence of candidiasis. Do not swallow.   lancets (Accu-Chek Softclix Lancets) misc   No No   Sig: Use 1 new lancet to test blood sugar 3 times a day   magnesium 30 mg tablet Past Week  Yes Yes   Sig: Take 1 tablet (30 mg) by mouth 2 times a day.   meloxicam (Mobic) 15 mg tablet Past Week  No Yes   Sig: Take 1 tablet (15 mg) by mouth once daily.   montelukast (Singulair) 10 mg tablet 2/6/2025 Bedtime  No Yes   Sig: Take 1 tablet (10 mg) by mouth once daily at bedtime.   pantoprazole (ProtoNix) 40 mg EC tablet 2/6/2025 Morning  No Yes   Sig: TAKE 1 TABLET BY MOUTH ONCE DAILY   rosuvastatin (Crestor) 20 mg tablet 2/6/2025 Morning  No Yes   Sig: TAKE 1 TABLET BY MOUTH ONCE DAILY   semaglutide (Ozempic) 0.25 mg or 0.5 mg (2 mg/3 mL) pen injector 2/2/2025  No No   Sig: Inject 0.5 mg under the skin every 7 days.   semaglutide (Ozempic) 1 mg/dose (4 mg/3 mL) pen injector Past Week  No Yes   Sig: Inject 1 mg under the skin every 7 days.                tezepelumab-ekko (Tezspire) SubQ Pen Injector   No No   Sig: Inject 1 pen (210 mg) under the skin every 28 (twenty-eight) days.   tiotropium (Spiriva Respimat) 2.5 mcg/actuation inhaler Not Taking  No No   Sig: Inhale 2 puffs once daily.   Patient not taking: Reported on 2/7/2025             vitamin E 45 mg (100 unit) capsule 2/6/2025 Morning   Yes Yes   Sig: Take by mouth once daily.      Facility-Administered Medications: None       The list below reflectives the updated allergy list. Please review each documented allergy for additional clarification and justification.    Allergies   Allergen Reactions    Bee Venom Protein (Honey Bee) Unknown and Shortness of breath     Severe allergy reaction to wasp    Dog Dander Unknown          02/07/25 at 8:28 PM - Malia Lester

## 2025-02-08 NOTE — NURSING NOTE
AVS discussed with pt. IV removed. Tele removed. Belongings secured in a bag prior to discharge. Pt discharged to home

## 2025-02-08 NOTE — PROGRESS NOTES
Physical Therapy     Neuro Evaluation    Patient Name: Edmond Cool  MRN: [id number]  Department: [place] [id number]  Room: [address] [place] [address]  Today's Date: 02/08/25    Assessment:   PT Assessment Results: Impaired balance, Impaired sensation (dizziness and disequilibrium)  Rehab Prognosis: Excellent  Evaluation/Treatment Tolerance: Patient tolerated treatment well  Strengths: Ability to acquire knowledge  Barriers to Participation:  (none)  Assessment Comment: 53 y/o F presents with impaired balance and gait with subjective report of dizziness and disequilibrium; no nystagmus noted during assessment. Pt is at a lower risk for fall per Tinetti Gait and Balance score of 19/28; benefit from continued therapy to assist with discharge planning and address aforementioned issues to enable her to return to IND community ambulation with no risk for falls.         Plan:       Current Problem:   1. Dizziness  Transthoracic Echo (TTE) Complete    Transthoracic Echo (TTE) Complete    Referral to Primary Care - Family Practice    Referral to Physical Therapy    meclizine (Antivert) 25 mg tablet      2. Vertigo        3. Abscess            Subjective   General Visit Information:  Reason for Referral: 53 y/o F to ED with dizziness, disequilibrium, unsteady gait, abd impaired balance; recently developed an abscess on R clavicle and placed on Keflex; states symptoms of disequilibrium and dizziness soon after that.   Referred By: GOLDEN Murrell-CNP  Past Medical History Relevant to Rehab:   Past Medical History:   Diagnosis Date    Essential (primary) hypertension 05/23/2019    HTN (hypertension), benign    History of herniated intervertebral disc    History of asthma    History of hypercholesterolemia    History of urinary tract infection       Family/Caregiver Present: No  Co-Treatment: OT  Co-Treatment Reason: High priority due to OBS status  Prior to Session Communication: Bedside nurse  Patient Position Received: Bed, 2 rail up,  Alarm off, not on at start of session  Preferred Learning Style: auditory, verbal  General Comment: No barriers noted to mobility in chart or by nurse; pt agreeable to therapy evals.    Precautions:  Precautions  Medical Precautions: No known precautions/limitation     Pain:  Pain Assessment  Pain Assessment: 0-10  0-10 (Numeric) Pain Score: 0 - No pain  Home Living:  Type of Home: House  Lives With: Spouse  Home Adaptive Equipment: None  Home Layout: Two level  Alternate Level Stairs-Rails:  (unilateral)  Alternate Level Stairs-Number of Steps: 10  Home Access: Stairs to enter with rails  Entrance Stairs-Rails: Both  Entrance Stairs-Number of Steps: 2  Bathroom Shower/Tub: Tub/shower unit  Bathroom Toilet: Standard  Bathroom Equipment: Grab bars in shower  Prior Level of Function:  Level of [place]: Independent with ADLs and functional transfers, Independent with homemaking with ambulation  Receives Help From:  (spouse as needed)  ADL Assistance: Independent  Homemaking Assistance: Independent  Ambulatory Assistance: Independent (community distances, no device)  Vocational: Full time employment  Hand Dominance: Right    Objective   Cognition:  Overall Cognitive Status: Within Functional Limits  Orientation Level: Oriented X4  Attention: Within Functional Limits  Memory: Within Funtional Limits  Safety/Judgement: Within Functional Limits  Insight: Within function limits  Impulsive: Within functional limits  Processing Speed: Within funtional limits  Concussion VOMS:     Observation:  Observation Comment: Pt noted to have increased intensity of symptoms with moving sit->supine; no nystagmus noted. Pt reports feeling dizzy all of the time; increased activity worsens her symptoms and rest improves them.     Strength:   Grossly WFL B/L UE and LE's seated   Neuro Sensation:  Sensation Comment: grossly intact  Perception:  Inattention/Neglect: Appears intact  Initiation: Appears intact  Motor Planning: Appears  intact  Perseveration: Not present  Coordination:  Movements are Fluid and Coordinated: Yes (grossly)  Neuro Oculomotor:  Oculomotor Exam All Normal: Yes  Range of Motion: Normal  Smooth Pursuit: Normal  Horizontal Saccades: Normal  Vertical Saccades: Normal  Eye Alignment : Normal  Static Visual Acuity: Normal  Skew Deviation: Normal  Convergence: Normal  Neuro Vestibular:  R head thrust: Negative  L head thrust: Negative  Vestibular Nystagmus: None noted  Spontaneous Nystagmus: Absent  Gaze Evoked Nystagmus: Absent     Postural Control:  Postural Control: Within Functional Limits  Balance: Static Sitting Balance  Static Sitting-Balance Support: No upper extremity supported, Feet supported  Static Sitting-Level of Assistance: Independent  Static Sitting-Comment/Number of Minutes: on EOB    Dynamic Sitting Balance  Dynamic Sitting-Balance Support: No upper extremity supported, Feet supported  Dynamic Sitting-Level of Assistance: Independent  Dynamic Sitting-Balance: Forward lean (increases intensity of symptoms)  Dynamic Sitting-Comments: on EOB    Static Standing Balance  Static Standing-Balance Support: No upper extremity supported  Static Standing-Level of Assistance: Independent  Dynamic Standing Balance  Dynamic Standing-Balance Support: No upper extremity supported  Dynamic Standing-Level of Assistance: Close supervision  Dynamic Standing-Balance: Turning  Dynamic Standing-Comments: see Tinetti    Transfer/Mobility Assessment:   Bed Mobility:  Supine<->sit independently from flat bed    Transfer 1  Transfer From 1: Bed to  Transfer to 1: Stand, Sit  Technique 1: Sit to stand, Stand to sit  Transfer Level of Assistance 1: Independent    Ambulation/Gait Training 1  Surface 1: Level tile  Device 1: No device  Assistance 1: Distant supervision  Quality of Gait 1: Narrow base of support, Decreased step length, Inconsistent stride length (see Tinetti)  Comments/Distance (ft) 1: 100'    Extremities Assessment: AISSATOU    RUE : Within Functional Limits, LUE   LUE: Within Functional Limits, RLE   RLE : Within Functional Limits, and LLE   LLE : Within Functional Limits      Balance/Neuromuscular Re-Education   Balance/Neuromuscular Re-Education Activity Performed Yes   Balance/Neuromuscular Re-Education Activity 1 Pt verbally instructed in and provided demo for VOR1 exercises; able to perform return demo without difficulty, albeit with symptoms; exercises to be done for 10 reps or to pt tolerance several times a day.    Discussed the possible need for a cane to increase pt stability with walking, but pt is declining at this time.     Pt also educated in the importance of continuing her daily routine (versus spending increased time in bed or environment with decreased stimulation) to allow her habituate to the environment, but to not overdo it either, as this can worsen her symptoms as well.      Recommending OP vestibular therapy if symptoms do not lessen or subside.  Pt is open to this and MARYAM notified.      Outcome Measures:  [facility] Basic Mobility  Turning from your back to your side while in a flat bed without using bedrails: None  Moving from lying on your back to sitting on the side of a flat bed without using bedrails: None  Moving to and from bed to chair (including a wheelchair): None  Standing up from a chair using your arms (e.g. wheelchair or bedside chair): None  To walk in hospital room: None  Climbing 3-5 steps with railing: A little  Basic Mobility - Total Score: 23    Tinetti  Sitting Balance: Steady, safe  Arises: Able without using arms  Attempts to Arise: Able to arise, one attempt  Immediate Standing Balance (First 5 Seconds): Steady without walker or other support  Standing Balance: Narrow stance without support  Nudged: Staggers, grabs, catches self  Eyes Closed: Steady  Turned 360 Degrees: Steadiness: Unsteady (Grabs, staggers)  Turned 360 Degrees: Continuity of Steps: Discontinuous steps  Sitting Down: Uses  arms or not a smooth motion  Balance Score: 12  Initiation of Gait: No hesitancy  Step Height: R Swing Foot: Right foot complete clears floor  Step Length: R Swing Foot: Passes left stance foot  Step Height: L Swing Foot: Left foot complete clears floor  Step Length: L Swing Foot: Passes right stance foot  Step Symmetry: Right and left step length not equal (Estimate)  Step Continuity: Stopping or discontinuity between steps  Path: Mild/moderate deviation or uses walking aid  Trunk: Marked sway or uses walking aid  Walking Time: Heels almost touching while walking  Gait Score: 7  Total Score: 19     Education Documentation  Home Exercise Program, taught by Vicky De La Rosa, PT at 2/8/2025  1:16 PM.  Learner: Patient  Readiness: Acceptance  Method: Explanation  Response: Verbalizes Understanding, Demonstrated Understanding  Comment: Pt verbally instructed in an dprovided demo for VOR1 exercises; able to perform return demo without difficulty, albeit with symptoms; discussed the possible need for a cane to increase  pt stability with walking, but pt is declining at this time.    Mobility Training, taught by Vicky De La Rosa, PT at 2/8/2025  1:16 PM.  Learner: Patient  Readiness: Acceptance  Method: Explanation  Response: Verbalizes Understanding, Demonstrated Understanding  Comment: Pt verbally instructed in an dprovided demo for VOR1 exercises; able to perform return demo without difficulty, albeit with symptoms; discussed the possible need for a cane to increase  pt stability with walking, but pt is declining at this time.    Education Comments  No comments found.      OP EDUCATION:       Goals:  Encounter Problems       Encounter Problems (Active)       Balance       Goal 1- Tinetti       Start:  02/08/25    Expected End:  02/15/25       Pt will score >/= 24/28 on Tinetti gait and balance test to be at no risk for falls.          Goal 2- HEP       Start:  02/08/25    Expected End:  02/15/25       Pt will be IND  with HEP.             Mobility       STG - Patient will independently ambulate >450' with no device and WFL gait.       Start:  02/08/25    Expected End:  02/15/25            STG - Patient will ascend and descend a flight of stairs with 1 rail independently.       Start:  02/08/25                   Pain - Adult

## 2025-02-08 NOTE — DISCHARGE INSTRUCTIONS
[facility] Discharge Instructions  You came to the hospital with dizziness and were admitted for observation and further care.    Your discharge plan is to go home to recover.    Please see your primary care doctor in 1 week for follow-up.   An appointment has been requested for you but may you need to call your doctors office to schedule.    For any issues or concerns with appointments, call the  scheduling line at [phone] or the providers office directly.       See the attached information for education about any new medications and the condition(s) you were treated for.  Your medications may have changed so pay close attention to the list given to you at discharge and ask any questions you have before leaving the hospital.

## 2025-02-10 ENCOUNTER — PATIENT OUTREACH (OUTPATIENT)
Dept: CARE COORDINATION | Facility: CLINIC | Age: 55
End: 2025-02-10
Payer: COMMERCIAL

## 2025-02-10 LAB
ATRIAL RATE: 72 BPM
P AXIS: 65 DEGREES
P OFFSET: 200 MS
P ONSET: 154 MS
PR INTERVAL: 130 MS
Q ONSET: 219 MS
QRS COUNT: 12 BEATS
QRS DURATION: 66 MS
QT INTERVAL: 480 MS
QTC CALCULATION(BAZETT): 525 MS
QTC FREDERICIA: 510 MS
R AXIS: -3 DEGREES
T AXIS: 30 DEGREES
T OFFSET: 459 MS
VENTRICULAR RATE: 72 BPM

## 2025-02-10 SDOH — ECONOMIC STABILITY: FOOD INSECURITY
ARE ANY OF YOUR NEEDS URGENT? FOR EXAMPLE, UNCERTAINTY OF WHERE YOU WILL GET YOUR NEXT MEAL OR NOT HAVING THE MEDICATIONS YOU NEED TO TAKE TOMORROW.: NO

## 2025-02-10 SDOH — ECONOMIC STABILITY: GENERAL: WOULD YOU LIKE HELP WITH ANY OF THE FOLLOWING NEEDS?: I DONT NEED HELP WITH ANY OF THESE

## 2025-02-10 NOTE — PROGRESS NOTES
Reviewed chart prior to patient outreach. Outreach call to patient to support a smooth transition of care from recent admission.    Discharge facility:  [facility]  Discharge diagnosis: Dizziness   Admission date: 2/7/25  Discharge date:  2/8/25    PCP Appointment Date: 2/11/25  Specialist Appointment Date: 2/13/25 vestibular therapy    Spoke with patient, reviewed discharge medications, discharge instructions, assessed social needs, and provided education on importance of follow-up appointment with provider.     See  Hospital Encounter and Summary, and Discharge assessment below for further details. Information obtained from discharge summary from EMR.    Hospital Course  Edmond Cool is a 54 y.o. female presenting for dizziness. Was started on keflex 2/3/25 for abscess on her right clavicle. It was around this time (4 days ago) she started having the sensation of dizziness when she started taking the Keflex. She reports on Monday she took 1 dose. She took the doses on Tuesday, and only took 1 dose on Wednesday before she stopped due to the dizziness. The dizziness however did not improve with stopping the Keflex. Thursday she started developing disequilibrium and difficulty ambulating due to balance. She denies any aggravating factors for the dizziness, such as going from sitting to standing or turning her head too quickly.       Dizziness  - Possibly secondary to keflex   - CT head negative  - CTA head and neck with narrowing of basilar artery  - MRI brain with no evidence of acute infarct  - MRA head with no significant cranial stenosis or vessel occlusion. The basilar artery is diminutive in caliber secondary to fetal origin of the bilateral posterior cerebral arteries, normal variant   - echo with bubble study negative  - lipid panel with LDL 53 and HDL 57.1  - Continue statin  - Patient discharged with prescription for meclizine   - Instructed to take Doxycycline instead of Keflex  - Referral for  vestibular therapy sent  - Follow up with PCP     Abscess  - s/p I&D in ED  - No leukocytosis, a febrile  - Instructed to take Doxycycline for 5 days  - Follow up with PCP     Disposition  - Patient discharged home in stable condition. Prescription for meclizine sent.  Instructed to take Doxycycline instead of Keflex. Patient to follow up with PCP. Referral for vestibular therapy sent.     Medication List      START taking these medications     doxycycline 100 mg tablet; Commonly known as: Vibra-Tabs; Take 1 tablet   (100 mg) by mouth 2 times a day for 7 days. Take with a full glass of   water and do not lie down for at least 30 minutes after.   meclizine 25 mg tablet; Commonly known as: Antivert; Take 1 tablet (25   mg) by mouth 3 times a day as needed for dizziness for up to 5 days.     CM outreach:  Wrap Up  Wrap Up Additional Comments: Patient reports no change in dizziness, meclizine is not helping. Absess on clavicle is healing, changing dressing once daily. Patient has appointment with vestibular therapy on 2/13/25. (2/10/2025  3:47 PM)    Medications  Medications reviewed with patient/caregiver?: Yes (2/10/2025  3:47 PM)  Is the patient having any side effects they believe may be caused by any medication additions or changes?: (!) Yes (Patient is taking doxycyline, was unable to keep down this morning. Patient plans on taking on full stomach next dose.) (2/10/2025  3:47 PM)  Does the patient have all medications ordered at discharge?: Yes (2/10/2025  3:47 PM)  Care Management Interventions: No intervention needed (2/10/2025  3:47 PM)  Is the patient taking all medications as directed (includes completed medication regime)?: Yes (2/10/2025  3:47 PM)    Appointments  Does the patient have a primary care provider?: Yes (2/10/2025  3:47 PM)  Care Management Interventions: Verified appointment date/time/provider (2/10/2025  3:47 PM)  Has the patient kept scheduled appointments due by today?: Yes (2/10/2025  3:47  PM)    Patient Teaching  Does the patient have access to their discharge instructions?: Yes (2/10/2025  3:47 PM)  Care Management Interventions: Reviewed instructions with patient (2/10/2025  3:47 PM)  What is the patient's perception of their health status since discharge?: Same (2/10/2025  3:47 PM)    Will continue to monitor through transition period. Provided patient with my contact information for potential needs.    Cher Sepulveda RN BSN  O Care Manager  [phone]

## 2025-02-11 ENCOUNTER — APPOINTMENT (OUTPATIENT)
Dept: PHARMACY | Facility: HOSPITAL | Age: 55
End: 2025-02-11
Payer: COMMERCIAL

## 2025-02-11 ENCOUNTER — OFFICE VISIT (OUTPATIENT)
Dept: PRIMARY CARE | Facility: CLINIC | Age: 55
End: 2025-02-11
Payer: COMMERCIAL

## 2025-02-11 VITALS
WEIGHT: 159 LBS | BODY MASS INDEX: 29.26 KG/M2 | HEIGHT: 62 IN | DIASTOLIC BLOOD PRESSURE: 78 MMHG | HEART RATE: 81 BPM | SYSTOLIC BLOOD PRESSURE: 129 MMHG

## 2025-02-11 DIAGNOSIS — H81.10 BENIGN PAROXYSMAL POSITIONAL VERTIGO, UNSPECIFIED LATERALITY: Primary | ICD-10-CM

## 2025-02-11 DIAGNOSIS — F43.0 ACUTE REACTION TO STRESS: ICD-10-CM

## 2025-02-11 DIAGNOSIS — E11.9 TYPE 2 DIABETES MELLITUS WITHOUT COMPLICATION, WITHOUT LONG-TERM CURRENT USE OF INSULIN (MULTI): Primary | ICD-10-CM

## 2025-02-11 PROCEDURE — 99213 OFFICE O/P EST LOW 20 MIN: CPT | Performed by: FAMILY MEDICINE

## 2025-02-11 PROCEDURE — 3008F BODY MASS INDEX DOCD: CPT | Performed by: FAMILY MEDICINE

## 2025-02-11 PROCEDURE — 3078F DIAST BP <80 MM HG: CPT | Performed by: FAMILY MEDICINE

## 2025-02-11 PROCEDURE — 3044F HG A1C LEVEL LT 7.0%: CPT | Performed by: FAMILY MEDICINE

## 2025-02-11 PROCEDURE — 3074F SYST BP LT 130 MM HG: CPT | Performed by: FAMILY MEDICINE

## 2025-02-11 PROCEDURE — 1036F TOBACCO NON-USER: CPT | Performed by: FAMILY MEDICINE

## 2025-02-11 PROCEDURE — 3048F LDL-C <100 MG/DL: CPT | Performed by: FAMILY MEDICINE

## 2025-02-11 RX ORDER — PREDNISONE 20 MG/1
20 TABLET ORAL 2 TIMES DAILY
Qty: 10 TABLET | Refills: 0 | Status: SHIPPED | OUTPATIENT
Start: 2025-02-11 | End: 2025-02-16

## 2025-02-11 ASSESSMENT — ENCOUNTER SYMPTOMS
DEPRESSION: 0
CONSTITUTIONAL NEGATIVE: 1
LOSS OF SENSATION IN FEET: 0
CARDIOVASCULAR NEGATIVE: 1
OCCASIONAL FEELINGS OF UNSTEADINESS: 0
RESPIRATORY NEGATIVE: 1
DIABETIC ASSOCIATED SYMPTOMS: 0
GASTROINTESTINAL NEGATIVE: 1
MUSCULOSKELETAL NEGATIVE: 1
NEUROLOGICAL NEGATIVE: 1

## 2025-02-11 NOTE — PROGRESS NOTES
"Subjective   Patient ID: Edmond Cool is a 54 y.o. female who presents for No chief complaint on file..    HPI bpv, htn     Review of Systems   Constitutional: Negative.    HENT: Negative.     Respiratory: Negative.     Cardiovascular: Negative.    Gastrointestinal: Negative.    Musculoskeletal: Negative.    Neurological: Negative.        Objective   /78   Pulse 81   Ht 1.575 m (5' 2\")   Wt 72.1 kg (159 lb)   BMI 29.08 kg/m²     Physical Exam  Vitals reviewed.   Constitutional:       Appearance: Normal appearance. She is normal weight.   Eyes:      Extraocular Movements: Extraocular movements intact.      Conjunctiva/sclera: Conjunctivae normal.      Pupils: Pupils are equal, round, and reactive to light.   Cardiovascular:      Rate and Rhythm: Normal rate and regular rhythm.      Pulses: Normal pulses.      Heart sounds: Normal heart sounds.   Pulmonary:      Effort: Pulmonary effort is normal.      Breath sounds: Normal breath sounds.   Abdominal:      General: Bowel sounds are normal.      Palpations: Abdomen is soft.   Musculoskeletal:         General: Normal range of motion.   Skin:     General: Skin is warm and dry.   Neurological:      General: No focal deficit present.      Mental Status: She is alert and oriented to person, place, and time. Mental status is at baseline.         Assessment/Plan   Problem List Items Addressed This Visit    None  Visit Diagnoses         Codes    Benign paroxysmal positional vertigo, unspecified laterality    -  Primary H81.10    Relevant Medications    predniSONE (Deltasone) 20 mg tablet    Acute reaction to stress     F43.0    Relevant Orders    Referral to Psychology               "

## 2025-02-11 NOTE — PROGRESS NOTES
[facility] ([facility])    Edmond Cool is a 54 y.o. female was referred to Clinical Pharmacy Team to complete a comprehensive medication review (CMR) with a pharmacist as part of the [employer] diabetes program.  Pharmacy team may also provide assistance in diabetes management per discussion with referring provider and/or endocrinology.    Referring Provider: Roselyn Del Angel, APR*  Does patient follow with Endocrinology: Yes  Endocrinology Provider Name: Roselyn Del Angel, APR*    Subjective   Allergies   Allergen Reactions    Bee Venom Protein (Honey Bee) Unknown and Shortness of breath     Severe allergy reaction to wasp    Dog Dander Unknown        [facility]  [address]  [facility] [address]  Phone: [phone] Fax: [phone]    [facility] - [facility] - [facility] AT [facility]  [place] [address]  Phone: [phone] Fax: [phone]     Specialty Pharmacy  [address] [facility] [address]  Phone: [phone] Fax: [phone]    [id number] [place], [place] - [address] [place] [address]  [address] [facility] [address]  Phone: [phone] Fax: [phone]      Diabetes  She has type 2 diabetes mellitus. Her disease course has been stable. There are no hypoglycemic associated symptoms. There are no diabetic associated symptoms. There are no hypoglycemic complications. Risk factors for coronary artery disease include diabetes mellitus. Current diabetic treatments: Ozempic 0.5 mg weekly (pt lowered from 1 mg d/t stomach upset) She is compliant with treatment all of the time. There is no change in her home blood glucose trend.       Objective     There were no vitals taken for this visit.     LAB  Lab Results   Component Value Date    BILITOT 0.3 02/07/2025    CALCIUM 8.9 02/08/2025    CO2 26 02/08/2025     " 02/08/2025    CREATININE 0.53 02/08/2025    GLUCOSE 85 02/08/2025    ALKPHOS 150 (H) 02/07/2025    K 4.0 02/08/2025    PROT 7.6 02/07/2025     02/08/2025    AST 58 (H) 02/07/2025    ALT 69 (H) 02/07/2025    BUN 11 02/08/2025    ANIONGAP 10 02/08/2025    MG 1.96 02/07/2025    PHOS 3.8 08/18/2020     (H) 03/11/2022    ALBUMIN 4.4 02/07/2025    LIPASE 47 11/27/2024    GFRF >90 07/01/2023    GFRMALE CANCELED 03/19/2023     Lab Results   Component Value Date    TRIG 136 02/07/2025    CHOL 137 02/07/2025    LDLCALC 53 02/07/2025    HDL 57.1 02/07/2025     Lab Results   Component Value Date    HGBA1C 6.0 (H) 02/07/2025     Estimated body mass index is 29.26 kg/m² as calculated from the following:    Height as of 2/7/25: 1.575 m (5' 2\").    Weight as of 2/7/25: 72.6 kg (160 lb).     Current Outpatient Medications on File Prior to Visit   Medication Sig Dispense Refill    albuterol 90 mcg/actuation inhaler INHALE 2 PUFFS BY MOUTH EVERY 4 HOURS AS NEEDED FOR WHEEZING 6.7 g 2    alcohol swabs (Alcohol Pads) pads, medicated Use as directed for diabetes care 300 each 3    amLODIPine (Norvasc) 5 mg tablet Take 1 tablet (5 mg) by mouth once daily. 90 tablet 1    azelastine (Astelin) 137 mcg (0.1 %) nasal spray Administer 1 spray into each nostril 2 times a day. Use in each nostril as directed 30 mL 3    BD Ultra-Fine April Pen Needle 32 gauge x 5/32\" needle       blood sugar diagnostic (Accu-Chek Guide test strips) strip Use as directed to test sugars up to 3 times daily 300 each 3    blood-glucose meter (Accu-Chek Guide Glucose Meter) misc Use to check glucose as directed 1 each 0    doxycycline (Vibra-Tabs) 100 mg tablet Take 1 tablet (100 mg) by mouth 2 times a day for 7 days. Take with a full glass of water and do not lie down for at least 30 minutes after. 14 tablet 0    fluticasone (Flonase) 50 mcg/actuation nasal spray USE 1 SPRAY IN EACH NOSTRIL EVERY 12 HOURS 16 g 3    fluticasone propion-salmeteroL " (Advair Diskus) 500-50 mcg/dose diskus inhaler Inhale 1 puff 2 times a day. Rinse mouth with water after use to reduce aftertaste and incidence of candidiasis. Do not swallow. 60 each 3    lancets (Accu-Chek Softclix Lancets) misc Use 1 new lancet to test blood sugar 3 times a day 300 each 3    magnesium 30 mg tablet Take 1 tablet (30 mg) by mouth 2 times a day.      meclizine (Antivert) 25 mg tablet Take 1 tablet (25 mg) by mouth 3 times a day as needed for dizziness for up to 5 days. 15 tablet 0    meloxicam (Mobic) 15 mg tablet Take 1 tablet (15 mg) by mouth once daily. 14 tablet 0    montelukast (Singulair) 10 mg tablet Take 1 tablet (10 mg) by mouth once daily at bedtime. 90 tablet 3    pantoprazole (ProtoNix) 40 mg EC tablet TAKE 1 TABLET BY MOUTH ONCE DAILY 90 tablet 0    PARoxetine (Paxil) 10 mg tablet Take 1 tablet (10 mg) by mouth once daily in the morning. 30 tablet 1    rosuvastatin (Crestor) 20 mg tablet TAKE 1 TABLET BY MOUTH ONCE DAILY 90 tablet 3    semaglutide (Ozempic) 0.25 mg or 0.5 mg (2 mg/3 mL) pen injector Inject 0.5 mg under the skin every 7 days. 3 mL 11    semaglutide (Ozempic) 1 mg/dose (4 mg/3 mL) pen injector Inject 1 mg under the skin every 7 days. (Patient not taking: Reported on 2/11/2025) 3 mL 11    tezepelumab-ekko (Tezspire) SubQ Pen Injector Inject 1 pen (210 mg) under the skin every 28 (twenty-eight) days. 1.91 mL 11    vitamin E 45 mg (100 unit) capsule Take by mouth once daily.      [DISCONTINUED] albuterol 2.5 mg /3 mL (0.083 %) nebulizer solution Take 3 mL (2.5 mg) by nebulization every 6 hours if needed for wheezing. 1080 mL 3    [DISCONTINUED] benralizumab (Fasenra Pen) 30 mg/mL injection Inject 1 mL (30 mg) under the skin every 28 (twenty-eight) days for 3 doses. 1 mL 1    [DISCONTINUED] cephalexin (Keflex) 500 mg capsule Take 1 capsule (500 mg) by mouth 4 times a day for 7 days. 28 capsule 0    [DISCONTINUED] fluticasone propion-salmeteroL (Advair Diskus) 500-50 mcg/dose  diskus inhaler Inhale 1 puff 2 times a day. 60 each 3    [DISCONTINUED] sodium,potassium,mag sulfates (Suprep) 17.5-3.13-1.6 gram recon soln solution Take one bottle beginning at 6pm night before procedure and then take the other bottle 5 hours before procedure time as directed per instruction sheet (Patient not taking: Reported on 2/7/2025) 354 mL 0    [DISCONTINUED] tiotropium (Spiriva Respimat) 2.5 mcg/actuation inhaler Inhale 2 puffs once daily. (Patient not taking: Reported on 2/7/2025) 4 g 3    [DISCONTINUED] tiotropium (Spiriva Respimat) 2.5 mcg/actuation inhaler Inhale 2 puffs once daily. 4 g 3     No current facility-administered medications on file prior to visit.      Secondary Prevention:  Statin? Yes  ACE-I/ARB? No  Aspirin? No    Pertinent PMH Review:  PMH of Pancreatitis: No  PMH of Retinopathy: No  PMH of Urinary Tract Infections: No  PMH of MTC: No    ASSESSMENT/PLAN:   Employee [employer] Diabetes Program ([facility])  - Patient enrolled in  Employee diabetes program for $0 co-pays on diabetes medications/supplies. Enrollment should be active in 2-4 weeks. Refills sent to  pharmacy for diabetes medications/supplies as part of  Employee Diabetes Program (subject to change per provider preferences)  - Requested [facility] enrollment date: 2/11/25  - PharmD Management Level: 4  -  Pharmacy fill location: [facility]    Assessment/Plan   Problem List Items Addressed This Visit       Type 2 diabetes mellitus without complications (Multi) - Primary        Follow up: 11 months    Continue all meds under the continuation of care with the referring provider and clinical pharmacy team.    John He, PharmD     Verbal consent to manage patient's drug therapy was obtained from [the patient and/or an individual authorized to act on behalf of a patient]. They were informed they may decline to participate or withdraw from participation in pharmacy services at any time.

## 2025-02-13 ENCOUNTER — CLINICAL SUPPORT (OUTPATIENT)
Dept: PHYSICAL THERAPY | Facility: CLINIC | Age: 55
End: 2025-02-13
Payer: COMMERCIAL

## 2025-02-13 VITALS — SYSTOLIC BLOOD PRESSURE: 128 MMHG | HEART RATE: 66 BPM | DIASTOLIC BLOOD PRESSURE: 74 MMHG

## 2025-02-13 DIAGNOSIS — R26.89 IMBALANCE: Primary | ICD-10-CM

## 2025-02-13 DIAGNOSIS — R42 DIZZINESS: ICD-10-CM

## 2025-02-13 PROCEDURE — 97112 NEUROMUSCULAR REEDUCATION: CPT | Mod: GP | Performed by: PHYSICAL THERAPIST

## 2025-02-13 PROCEDURE — 97161 PT EVAL LOW COMPLEX 20 MIN: CPT | Mod: GP | Performed by: PHYSICAL THERAPIST

## 2025-02-13 ASSESSMENT — ENCOUNTER SYMPTOMS
DEPRESSION: 1
OCCASIONAL FEELINGS OF UNSTEADINESS: 0
LOSS OF SENSATION IN FEET: 0

## 2025-02-13 ASSESSMENT — PAIN SCALES - GENERAL: PAINLEVEL_OUTOF10: 0 - NO PAIN

## 2025-02-13 ASSESSMENT — PAIN - FUNCTIONAL ASSESSMENT: PAIN_FUNCTIONAL_ASSESSMENT: 0-10

## 2025-02-13 NOTE — PROGRESS NOTES
Physical Therapy    Physical Therapy Evaluation and Treatment      Patient Name: Edmond Cool  MRN: [id number]  Today's Date: 2/14/2025   Employee  Auth required  Time Entry:   Time Calculation  Start Time: 1157  Stop Time: 1250  Time Calculation (min): 53 min  PT Evaluation Time Entry  PT Evaluation (Low) Time Entry: 33  PT Therapeutic Procedures Time Entry  Neuromuscular Re-Education Time Entry: 25       Assessment:  Patient is a 54 year old female referred to [facility]'s outpatient physical therapy services with a chief complaint of dizziness and imbalance.  The patient reports that her symptoms have improved since initial onset however pt. Continues to report dizziness when performing IADLs such as cleaning and imbalance during walking.  Pt. Scored a 48/100 on the DHI, scoring in a moderate category of self rated disability due to dizziness. I did not detect a positive finding on head thrust testing due to pt. Blinking,  however pt. With dizziness with VORx1 in horizontal and vertical directions which is consistent with a peripheral vestibulopathy.  The patient tested negative for BPPV and did not demonstrate any central findings.  I suspect that the patient had an acute vestibulopathy that has led to her current symptoms.  I recommended that pt. Refer to ENT for further medical evaluation and diagnosis.  In addition pt. Has a history of cervicogenic dizziness, the patient presented with dizziness when cervical extension.  Thus, this may need to be addressed during plan of care as well.  The patient will benefit from physical therapy services for vestibular adaptation, habituation and balance training to improve quality of life and decrease dizziness.    PT Assessment  PT Assessment Results: Impaired balance, Decreased mobility, Decreased endurance (dizziness)  Rehab Prognosis: Good     Plan:  OP PT Plan  Treatment/Interventions: Cryotherapy, Canalith repositioning, Education/ Instruction, Hot  pack, Gait training, Manual therapy, Neuromuscular re-education, Self care/ home management, Taping techniques, Therapeutic activities, Therapeutic exercises  PT Plan: Skilled PT  PT Frequency: 1 time per week  Duration: 10 visits  Onset Date: 02/08/25  Certification Period Start Date: 02/13/25  Certification Period End Date: 05/14/25    Current Problem:   1. Imbalance        2. Dizziness  Referral to Physical Therapy    Follow Up In Physical Therapy          Subjective    Pt. Reports that last weekend she had an abscess that was tiny and it turned into a full abscess and it was lanced and removed, however they found that it was infected.  She was put on Keflex and in 24 hours this ball of sweat hit her and she got disoriented and she felt herself spinning and she felt like she was not there.  She sat down and that was the beginning of the dizziness. At that time she took Keflex twice. This dizziness and imbalance was getting worse throughout the week. They took her BP at the doctor and now by Friday she was walking to the left. She then went to [facility].  They did a stroke work up and everything was negative.  She has noticed she can't do too much. She noticed that she had couldn't mop because she felt dizzy. She can drive. They gave her Valium and meclizine and she is off of those now.  She is on prednisone now which is messing up her blood sugar.  She has DMII. She is on Ozempic.  She had a headache at the time.  She has no history of migraines  She has a prior shoulder injury. She was also very light sensitive.     CLOF: she feels dizzy if she does too much, sometimes holding onto walls, she took FMLA this week    Hobbies: likes to be on the goal, likes to go shopping, likes to go to the gym  Physical Activity: likes to go to the gym, starts with cardio. She used to go on the bike. She had bad plantar fascitis on her right but stopped doing the treadmill and she does a circuit workout.   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 Goal: \" how to handle dizziness and work\"    HPI:   Ed hospital Discharge Dr. Selene Mckeon MD 2/7/25  \"Hospital Course  Edmond Cool is a 54 y.o. female presenting for dizziness. Was started on keflex 2/3/25 for abscess on her right clavicle. It was around this time (4 days ago) she started having the sensation of dizziness when she started taking the Keflex. She reports on Monday she took 1 dose. She took the doses on Tuesday, and only took 1 dose on Wednesday before she stopped due to the dizziness. The dizziness however did not improve with stopping the Keflex. Thursday she started developing disequilibrium and difficulty ambulating due to balance. She denies any aggravating factors for the dizziness, such as going from sitting to standing or turning her head too quickly.       Dizziness  - Possibly secondary to keflex   - CT head negative  - CTA head and neck with narrowing of basilar artery  - MRI brain with no evidence of acute infarct  - MRA head with no significant cranial stenosis or vessel occlusion. The basilar artery is diminutive in caliber secondary to fetal origin of the bilateral posterior cerebral arteries, normal variant   - echo with bubble study negative  - lipid panel with LDL 53 and HDL 57.1  - Continue statin  - Patient discharged with prescription for meclizine   - Instructed to take Doxycycline instead of Keflex  - Referral for vestibular therapy sent  - Follow up with PCP     Abscess  - s/p I&D in ED  - No leukocytosis, a febrile  - Instructed to take Doxycycline for 5 days  - Follow up with PCP     Disposition  - Patient discharged home in stable condition. Prescription for meclizine sent.  Instructed to take Doxycycline instead of Keflex. Patient to follow up with PCP. Referral for vestibular therapy sent. \"  General  Reason for Referral: dizziness  Referred By: Roxy Swift PA-C  Preferred Learning " "Style: auditory, visual, kinesthetic  Precautions: AAA, HTN, DMII,   Precautions  STEADI Fall Risk Score (The score of 4 or more indicates an increased risk of falling): 1  Vital Signs:  Vital Signs  Heart Rate: 66  BP: 128/74  BP Location: Left arm  BP Method: Automatic  Patient Position: Sitting    Pain:  Pain Assessment  Pain Assessment: 0-10  0-10 (Numeric) Pain Score: 0 - No pain      Objective   Vestibular assessment:  sensory deficits=WNL  visual deficits= wears glasses for distance  hearing loss=WNL  cervical ROM: WNL (dizziness with looking downward)  Ocular ROM=WNL  Gaze evoked nystagmus (Without fixation)= R gaze=4 beats of R fast pace, L gaze=neg, upward=neg.  Saccades=WNL  Smooth pursuits=WNL  Spontaneous nystagmus=neg.  Head thrust test=dizziness, pt. Blinking therfore unable to determine result  VOR x1 sitting horizontal plain target=5/10 dizzy 15\"x1  VOR x1 sitting vertical plain target=5/10 dizzy 30\"x1  Head shaking nystagmus test=neg.  Roll Test R=neg.  Roll test L=neg.  [place] hallpike R=neg.  [place] hallpike L=neg.  Supine to sitting= dizzy       *due to time constraints further balance testing will be performed in next 1-2 visits    Outcome Measures:  Other Measures  Dizziness Handicap Inventory: 56     Treatments:    Neuro Re-Ed:  Skilled vestibular specific education was provided to patient on three balance systems including vestibular system, visual system and balance systems and how an impairment of one or more of these systems can cause dizziness or imbalance.  Discussed how vestibular based therapy treatment can address these deficits.   -educated pt. On anatomy/physiology of vestibular system, s/s of acute vestibulopathy and provide pictures  -educated pt. On performing VORx1 12 min/day for vestibular adaptation  -educated pt. On referral to ENT for further testing    EDUCATION:  Outpatient Education  Individual(s) Educated: Patient  Education Provided: Anatomy, Home Safety, Physiology, Home " "Exercise Program  Risk and Benefits Discussed with Patient/Caregiver/Other: yes  Patient/Caregiver Demonstrated Understanding: yes  Plan of Care Discussed and Agreed Upon: yes  Patient Response to Education: Patient/Caregiver Verbalized Understanding of Information    Goals:  Active       PT Problem       PT Goal 1       Start:  02/14/25    Expected End:  05/14/25       Pt. Will complete VORx1 in standing for 1 minute without dizziness to indicate improved gaze stabilization by end of POC.         PT Goal 2       Start:  02/14/25    Expected End:  05/14/25       Pt. Will improve DHI by 18 points to meet MCID for significant change and indicate improved quality of life by end of POC.          PT Goal 3       Start:  02/14/25    Expected End:  05/14/25       Pt. Will be able to clean for 1 hour without dizziness by end of POC.         PT Goal 4       Start:  02/14/25    Expected End:  05/14/25       Pt. Will return to participating in hobby of going shopping for 2 hour duration without limitations.         PT Goal 5       Start:  02/14/25    Expected End:  05/14/25       Pt. Will be independent in HEP in next 1-2 visits to promote self efficacy, manage symptoms and meet other LTG.          Patient Stated Goal 1       Start:  02/14/25    Expected End:  05/14/25       Pt. Goal: \" how to handle dizziness and work\"         balance testing       Start:  02/14/25       Further balance tests will be performed in next 1-2 visits.                   "

## 2025-02-14 DIAGNOSIS — H81.90 VESTIBULAR DYSFUNCTION, UNSPECIFIED LATERALITY: Primary | ICD-10-CM

## 2025-02-14 PROBLEM — R26.89 IMBALANCE: Status: ACTIVE | Noted: 2025-02-14

## 2025-02-18 ENCOUNTER — TREATMENT (OUTPATIENT)
Dept: PHYSICAL THERAPY | Facility: CLINIC | Age: 55
End: 2025-02-18
Payer: COMMERCIAL

## 2025-02-18 DIAGNOSIS — R42 DIZZINESS: ICD-10-CM

## 2025-02-18 PROCEDURE — 97112 NEUROMUSCULAR REEDUCATION: CPT | Mod: GP | Performed by: PHYSICAL THERAPIST

## 2025-02-18 NOTE — PROGRESS NOTES
Physical Therapy    Physical Therapy Treatment    Patient Name: Edmond Cool  MRN: [id number]  Today's Date: 2/20/2025   employee  Visit number: 2  Visits approved:  Primary dx= dizziness  Time Entry:   Time Calculation  Start Time: 1715  Stop Time: 1811  Time Calculation (min): 56 min     PT Therapeutic Procedures Time Entry  Neuromuscular Re-Education Time Entry: 56       Assessment:  Further balance testing was performed today including MCTSIB and FGA.  Pt. Scored a 21/30 on the FGA which is considered a fall risk compared to normative value cut off score of 23/30.  Ms. Cool presented with difficulty with head turns and eyes closed conditions of balance which is consistent with a vestibular impairment. In addition pt. With difficulty with eyes closed on foam condition of MCTSIB which is also indicates reduced utilization of vestibular cues for balance.  New goals were established for both FGA and MCTSIB today.  The patient is reporting symptoms of imbalance when turning especially at work, educated pt. On importance of utilizing compensatory techniques including visual and somatosensory cues.     Plan: habituation, balance exercises, vestibular adapation       Current Problem  1. Dizziness  Follow Up In Physical Therapy          General  Pt. Doesn't feel dizzy per say but feels more off balance.  She wears glasses for distance.  She is sensitive to bright lights.  She has been avoiding certain movements.  She is doing the VORx1 at home mabye 5 minutes total a day.  She has an ENT appointment in a few weeks.  She went to the gym on Saturday and Sunday, it went well. She did a mile on the stationary bike and she the leg press.  She did a half mile on the treadmill both days.  She denies neck pain.  The last time she felt dizzy was a few days ago.          Subjective    Precautions: mild fall risk, AAA, HTN, DM II     Vital Signs: not taken     Pain: 0/10  Pain Assessment  Pain Assessment: 0-10  0-10  Report called to RN in NICU.   "(Numeric) Pain Score: 0 - No pain    Objective     Modified Clinical Sensory Integration Test  Condition One: Eyes Open, Firm Surface  Total Time: __30_____ / 30 sec  Condition Two: Eyes Closed, Firm Surface  Total Time: __30_____ / 30 sec  Condition Three: Eyes Open, Foam Surface  Total Time: __30_____ / 30 sec  Condition Four: Eyes Closed, Foam Surface  Total Time: __5,7,6, average=6_____ / 30 sec        Outcome Measures:  FGA=21/30    Treatments:    Neuro Re-Ed:  Modified Clinical Sensory Integration Test  Condition One: Eyes Open, Firm Surface  Condition Two: Eyes Closed, Firm Surface  Condition Three: Eyes Open, Foam Surface  Condition Four: Eyes Closed, Foam Surface  -VORx1 horizontal sitting plain target 30\"x2 with 5/10 dizzy requiring about 1 minute to return to baseline  -VORx1 vertical sitting plain target 40\"x1 with 4/10 dizzy requiring about 1 minute to return to baseline  -educated pt. On \"rooting\" through feet during daily activities such as turns  -horizontal HT with functional mobility 30'x3 with VC to \"root through feet\"    The following exercises were completed at supervision level over 20ft. distance  Gait level surface:  Change in gait speed:  Gait with horizontal head turns:  Gait with vertical head turns:  Gait with pivot turn:  Step over obstacle:  Gait with narrow NATHANIEL:  Gait with eyes closed:  Ambulating backwards:  Stairs 3-6 inch stairs   Educated pt. On fall risk cut off score.     Modified Clinical Sensory Integration Test  Condition One: Eyes Open, Firm Surface  Condition Two: Eyes Closed, Firm Surface  Condition Three: Eyes Open, Foam Surface  Condition Four: Eyes Closed, Foam Surface    OP EDUCATION:    HEP:  -VORx1 near/far  -dizzy diary  -functional mobility with horizontal HT       Goals:  Active       PT Problem       PT Goal 1       Start:  02/14/25    Expected End:  05/14/25       Pt. Will complete VORx1 in standing for 1 minute without dizziness to indicate improved gaze " "stabilization by end of POC.         PT Goal 2       Start:  02/14/25    Expected End:  05/14/25       Pt. Will improve DHI by 18 points to meet MCID for significant change and indicate improved quality of life by end of POC.          PT Goal 3       Start:  02/14/25    Expected End:  05/14/25       Pt. Will be able to clean for 1 hour without dizziness by end of POC.         PT Goal 4       Start:  02/14/25    Expected End:  05/14/25       Pt. Will return to participating in hobby of going shopping for 2 hour duration without limitations.         PT Goal 5       Start:  02/14/25    Expected End:  05/14/25       Pt. Will be independent in HEP in next 1-2 visits to promote self efficacy, manage symptoms and meet other LTG.          Patient Stated Goal 1       Start:  02/14/25    Expected End:  05/14/25       Pt. Goal: \" how to handle dizziness and work\"         balance testing       Start:  02/14/25    Expected End:  05/14/25       Pt. Will improve FGA by 4 points in order to meet MCID for significant change compared to normative values and indicate improved dynamic balance.          MCTSIB       Start:  02/20/25    Expected End:  05/14/25       Pt. Will improve EC on foam condition of MCTSIB to indicate improved utilization of vestibular cues for balance by end of POC.            "

## 2025-02-20 ASSESSMENT — PAIN SCALES - GENERAL: PAINLEVEL_OUTOF10: 0 - NO PAIN

## 2025-02-20 ASSESSMENT — PAIN - FUNCTIONAL ASSESSMENT: PAIN_FUNCTIONAL_ASSESSMENT: 0-10

## 2025-02-27 DIAGNOSIS — E11.9 TYPE 2 DIABETES MELLITUS WITHOUT COMPLICATION, UNSPECIFIED WHETHER LONG TERM INSULIN USE (MULTI): ICD-10-CM

## 2025-02-27 DIAGNOSIS — I10 BENIGN ESSENTIAL HYPERTENSION: ICD-10-CM

## 2025-02-27 PROCEDURE — RXMED WILLOW AMBULATORY MEDICATION CHARGE

## 2025-02-27 RX ORDER — LANCETS
1 EACH MISCELLANEOUS DAILY
Qty: 100 EACH | Refills: 1 | Status: SHIPPED | OUTPATIENT
Start: 2025-02-27

## 2025-02-27 RX ORDER — AMLODIPINE BESYLATE 5 MG/1
5 TABLET ORAL DAILY
Qty: 90 TABLET | Refills: 1 | Status: SHIPPED | OUTPATIENT
Start: 2025-02-27 | End: 2025-08-26

## 2025-02-28 ENCOUNTER — PHARMACY VISIT (OUTPATIENT)
Dept: PHARMACY | Facility: CLINIC | Age: 55
End: 2025-02-28
Payer: COMMERCIAL

## 2025-03-06 ENCOUNTER — APPOINTMENT (OUTPATIENT)
Dept: PHYSICAL THERAPY | Facility: CLINIC | Age: 55
End: 2025-03-06
Payer: COMMERCIAL

## 2025-03-06 NOTE — PROGRESS NOTES
Physical Therapy    Physical Therapy Treatment    Patient Name: Edmond Cool  MRN: [id number]  Today's Date: 3/6/2025   employee  Visit number: 3  Visits approved:  Primary dx= dizziness  Time Entry:                 Assessment:  Further balance testing was performed today including MCTSIB and FGA.  Pt. Scored a 21/30 on the FGA which is considered a fall risk compared to normative value cut off score of 23/30.  Ms. Cool presented with difficulty with head turns and eyes closed conditions of balance which is consistent with a vestibular impairment. In addition pt. With difficulty with eyes closed on foam condition of MCTSIB which is also indicates reduced utilization of vestibular cues for balance.  New goals were established for both FGA and MCTSIB today.  The patient is reporting symptoms of imbalance when turning especially at work, educated pt. On importance of utilizing compensatory techniques including visual and somatosensory cues.     Plan: habituation, balance exercises, vestibular adapation       Current Problem  No diagnosis found.      General  Pt. Doesn't feel dizzy per say but feels more off balance.  She wears glasses for distance.  She is sensitive to bright lights.  She has been avoiding certain movements.  She is doing the VORx1 at home mabye 5 minutes total a day.  She has an ENT appointment in a few weeks.  She went to the gym on Saturday and Sunday, it went well. She did a mile on the stationary bike and she the leg press.  She did a half mile on the treadmill both days.  She denies neck pain.  The last time she felt dizzy was a few days ago.          Subjective    Precautions: mild fall risk, AAA, HTN, DM II     Vital Signs: not taken     Pain: 0/10       Objective     Modified Clinical Sensory Integration Test  Condition One: Eyes Open, Firm Surface  Total Time: __30_____ / 30 sec  Condition Two: Eyes Closed, Firm Surface  Total Time: __30_____ / 30 sec  Condition Three: Eyes  "Open, Foam Surface  Total Time: __30_____ / 30 sec  Condition Four: Eyes Closed, Foam Surface  Total Time: __5,7,6, average=6_____ / 30 sec        Outcome Measures:  FGA=21/30    Treatments:    Neuro Re-Ed:  Modified Clinical Sensory Integration Test  Condition One: Eyes Open, Firm Surface  Condition Two: Eyes Closed, Firm Surface  Condition Three: Eyes Open, Foam Surface  Condition Four: Eyes Closed, Foam Surface  -VORx1 horizontal sitting plain target 30\"x2 with 5/10 dizzy requiring about 1 minute to return to baseline  -VORx1 vertical sitting plain target 40\"x1 with 4/10 dizzy requiring about 1 minute to return to baseline  -educated pt. On \"rooting\" through feet during daily activities such as turns  -horizontal HT with functional mobility 30'x3 with VC to \"root through feet\"    The following exercises were completed at supervision level over 20ft. distance  Gait level surface:  Change in gait speed:  Gait with horizontal head turns:  Gait with vertical head turns:  Gait with pivot turn:  Step over obstacle:  Gait with narrow NATHANIEL:  Gait with eyes closed:  Ambulating backwards:  Stairs 3-6 inch stairs   Educated pt. On fall risk cut off score.     Modified Clinical Sensory Integration Test  Condition One: Eyes Open, Firm Surface  Condition Two: Eyes Closed, Firm Surface  Condition Three: Eyes Open, Foam Surface  Condition Four: Eyes Closed, Foam Surface    OP EDUCATION:    HEP:  -VORx1 near/far  -dizzy diary  -functional mobility with horizontal HT       Goals:      "

## 2025-03-07 ENCOUNTER — ANCILLARY PROCEDURE (OUTPATIENT)
Dept: URGENT CARE | Age: 55
End: 2025-03-07
Payer: COMMERCIAL

## 2025-03-07 ENCOUNTER — APPOINTMENT (OUTPATIENT)
Dept: ENDOCRINOLOGY | Facility: CLINIC | Age: 55
End: 2025-03-07
Payer: COMMERCIAL

## 2025-03-07 ENCOUNTER — OFFICE VISIT (OUTPATIENT)
Dept: URGENT CARE | Age: 55
End: 2025-03-07
Payer: COMMERCIAL

## 2025-03-07 VITALS
SYSTOLIC BLOOD PRESSURE: 138 MMHG | HEART RATE: 90 BPM | DIASTOLIC BLOOD PRESSURE: 84 MMHG | TEMPERATURE: 98.4 F | OXYGEN SATURATION: 99 % | RESPIRATION RATE: 15 BRPM

## 2025-03-07 DIAGNOSIS — R06.2 WHEEZING: ICD-10-CM

## 2025-03-07 DIAGNOSIS — R06.2 WHEEZING: Primary | ICD-10-CM

## 2025-03-07 DIAGNOSIS — J45.40 MODERATE PERSISTENT ASTHMA, UNSPECIFIED WHETHER COMPLICATED (HHS-HCC): ICD-10-CM

## 2025-03-07 DIAGNOSIS — R68.89 FLU-LIKE SYMPTOMS: ICD-10-CM

## 2025-03-07 LAB
POC RAPID INFLUENZA A: NEGATIVE
POC RAPID INFLUENZA B: NEGATIVE
POC SARS-COV-2 AG BINAX: NORMAL

## 2025-03-07 PROCEDURE — 71046 X-RAY EXAM CHEST 2 VIEWS: CPT

## 2025-03-07 RX ORDER — AZITHROMYCIN 250 MG/1
TABLET, FILM COATED ORAL
Qty: 6 TABLET | Refills: 0 | Status: SHIPPED | OUTPATIENT
Start: 2025-03-07 | End: 2025-03-12

## 2025-03-07 RX ORDER — METHYLPREDNISOLONE 4 MG/1
TABLET ORAL
Qty: 21 TABLET | Refills: 0 | Status: SHIPPED | OUTPATIENT
Start: 2025-03-07 | End: 2025-03-13

## 2025-03-07 ASSESSMENT — ENCOUNTER SYMPTOMS
FEVER: 1
FATIGUE: 1
ACTIVITY CHANGE: 1
COUGH: 1
WHEEZING: 1

## 2025-03-07 NOTE — PATIENT INSTRUCTIONS
You were seen at Urgent Care today for flulike symptoms. Please treat as discussed. Please take medications as prescribed. Monitor for red flags which we spoke about, If your symptoms change, worsen or become concerning in any way, please go to the emergency room immediately, otherwise you can followup with your PCP in 2-3 days as needed

## 2025-03-07 NOTE — PROGRESS NOTES
Subjective   Patient ID: Edmond Cool is a 54 y.o. female. They present today with a chief complaint of Cough, Fever, and Generalized Body Aches.    History of Present Illness  Patient is a pleasant 54-year-old female with history of hypertension and asthma who presents urgent care today with a complaint of flulike symptoms.  She states her symptoms started 2 days ago.  Specifically she endorses productive cough, wheezing, fever and bodyaches.  She has been using her albuterol nebulizer at home as directed without significant relief.  She denies any chest pain or shortness of breath.  No other complaints or concerns mention at this time.      History provided by:  Patient  Cough  Associated symptoms include a fever and wheezing.   Fever   Associated symptoms include congestion, coughing and wheezing.       Past Medical History  Allergies as of 03/07/2025 - Reviewed 03/07/2025   Allergen Reaction Noted    Bee venom protein (honey bee) Unknown and Shortness of breath 11/14/2014    Dog dander Unknown 03/17/2023    Iodine Other 03/07/2025    Keflex [cephalexin] Other 03/07/2025       (Not in a hospital admission)         Past Medical History:   Diagnosis Date    Allergy to seafood 12/15/2022    History of allergy to shellfish    Candidiasis, unspecified 12/09/2022    Yeast infection    Encounter for contraceptive management, unspecified 08/09/2021    Contraception management    Encounter for gynecological examination (general) (routine) without abnormal findings 09/11/2020    Women's annual routine gynecological examination    Essential (primary) hypertension 05/23/2019    HTN (hypertension), benign    Other specified noninflammatory disorders of vagina 12/06/2021    Vaginal irritation    Personal history of other diseases of the musculoskeletal system and connective tissue 05/23/2019    History of herniated intervertebral disc    Personal history of other diseases of the respiratory system     History of  sinusitis    Personal history of other endocrine, nutritional and metabolic disease 2019    History of hypercholesterolemia    Personal history of other infectious and parasitic diseases 2021    History of trichomonal vaginitis    Personal history of other medical treatment 2021    History of screening mammography    Personal history of urinary (tract) infections     History of urinary tract infection       Past Surgical History:   Procedure Laterality Date    BREAST BIOPSY      OTHER SURGICAL HISTORY  2019     section        reports that she quit smoking about 26 years ago. Her smoking use included cigarettes. She has never used smokeless tobacco. She reports current alcohol use. She reports that she does not use drugs.    Review of Systems  Review of Systems   Constitutional:  Positive for activity change, fatigue and fever.   HENT:  Positive for congestion.    Respiratory:  Positive for cough and wheezing.                                   Objective    Vitals:    25 0919   BP: 138/84   Pulse: 90   Resp: 15   Temp: 36.9 °C (98.4 °F)   SpO2: 99%     No LMP recorded. Patient is perimenopausal.    Physical Exam  Vitals and nursing note reviewed.   Constitutional:       General: She is not in acute distress.     Appearance: Normal appearance. She is not ill-appearing, toxic-appearing or diaphoretic.   HENT:      Head: Normocephalic and atraumatic.      Nose: Congestion present.      Mouth/Throat:      Mouth: Mucous membranes are moist.      Pharynx: Posterior oropharyngeal erythema present. No oropharyngeal exudate.   Eyes:      Extraocular Movements: Extraocular movements intact.      Conjunctiva/sclera: Conjunctivae normal.      Pupils: Pupils are equal, round, and reactive to light.   Cardiovascular:      Rate and Rhythm: Normal rate and regular rhythm.      Pulses: Normal pulses.      Heart sounds: Normal heart sounds.   Pulmonary:      Effort: Pulmonary effort is normal. No  respiratory distress.      Breath sounds: No stridor. No rhonchi or rales.   Chest:      Chest wall: No tenderness.   Musculoskeletal:         General: Normal range of motion.      Cervical back: Normal range of motion and neck supple.   Skin:     General: Skin is warm and dry.      Capillary Refill: Capillary refill takes less than 2 seconds.   Neurological:      General: No focal deficit present.      Mental Status: She is alert and oriented to person, place, and time.   Psychiatric:         Mood and Affect: Mood normal.         Behavior: Behavior normal.         Procedures      Assessment/Plan   Allergies, medications, history, and pertinent labs/EKGs/Imaging reviewed by CHRISTINE Hutchison.     Medical Decision Making  Patient is well appearing, afebrile, non toxic, not hypoxic, and appropriate for outpatient treatment and management at time of evaluation.     Patient presents with flulike symptoms as described above.     Differential includes but not limited to: COVID, influenza, pneumonia, URI, other.     On exam, patient has wheezing in all fields bilaterally.  Oxygen saturation on room air is 99%.  Vitals are within normal limits.  Rapid flu and COVID are negative.  Chest x-ray ordered.  Image independently reviewed by myself and interpreted as slight stable elevation of the right diaphragm.  Mild stable fibrotic changes at the lung bases, left greater than the right.  No significant or acute interval change.  No new or enlarging opacity in either lung worrisome for tumor or pneumonia.    I discussed these finds with the patient and provided her with a hard copy of the radiology read.  At this time, I feel she has a viral upper respiratory infection with concurrent asthma exacerbation.  She was provided with a Medrol Dosepak and azithromycin to use as directed.  She has albuterol nebulizer at home which she can use as needed.  Also recommended close follow-up with PCP.  Red flags and ER precautions  discussed.  Patient voices understanding and is agreeable to this plan.  She was discharged in stable condition.  All questions and concerns addressed.        Orders and Diagnoses  Diagnoses and all orders for this visit:  Wheezing  -     XR chest 2 views; Future  Flu-like symptoms  -     POCT Influenza A/B manually resulted  Suspected COVID-19 virus infection  -     POCT Covid-19 Rapid Antigen  === 03/07/25 ===    XR CHEST 2 VIEWS    - Impression -  Slight stable elevation of the right diaphragm.    Mild stable fibrotic changes at the lung bases, left greater than  right.    No significant or acute interval change.    MACRO:  None    Signed by: Cameron Scott 3/7/2025 9:51 AM  Dictation workstation:   [id number]      Medical Admin Record      Follow Up Instructions  No follow-ups on file.    Patient disposition: Home    Electronically signed by CHRISTINE Hutchison  9:41 AM

## 2025-03-10 ENCOUNTER — PATIENT OUTREACH (OUTPATIENT)
Dept: CARE COORDINATION | Facility: CLINIC | Age: 55
End: 2025-03-10
Payer: COMMERCIAL

## 2025-03-10 NOTE — PROGRESS NOTES
Outreach call to patient to support a smooth transition of care from recent admission.  Left voicemail message for patient with my contact information.    Cher Sepulveda RN BSN  ACO Care Manager  [phone]

## 2025-03-12 ENCOUNTER — PATIENT OUTREACH (OUTPATIENT)
Dept: CARE COORDINATION | Facility: CLINIC | Age: 55
End: 2025-03-12

## 2025-03-12 ENCOUNTER — APPOINTMENT (OUTPATIENT)
Dept: PULMONOLOGY | Facility: CLINIC | Age: 55
End: 2025-03-12
Payer: COMMERCIAL

## 2025-03-12 ENCOUNTER — APPOINTMENT (OUTPATIENT)
Dept: BEHAVIORAL HEALTH | Facility: CLINIC | Age: 55
End: 2025-03-12
Payer: COMMERCIAL

## 2025-03-12 DIAGNOSIS — F41.1 GENERALIZED ANXIETY DISORDER: ICD-10-CM

## 2025-03-12 DIAGNOSIS — F33.0 MAJOR DEPRESSIVE DISORDER, RECURRENT EPISODE, MILD (CMS-HCC): Primary | ICD-10-CM

## 2025-03-12 PROCEDURE — 3044F HG A1C LEVEL LT 7.0%: CPT | Performed by: PSYCHIATRY & NEUROLOGY

## 2025-03-12 PROCEDURE — 90791 PSYCH DIAGNOSTIC EVALUATION: CPT | Performed by: PSYCHIATRY & NEUROLOGY

## 2025-03-12 PROCEDURE — 3048F LDL-C <100 MG/DL: CPT | Performed by: PSYCHIATRY & NEUROLOGY

## 2025-03-12 NOTE — PROGRESS NOTES
Reviewed chart prior to patient outreach. Patient has started vestibular therapy, and she has upcoming appointments with audiology and ENT.  Outreach call to patient to support a smooth transition of care from recent admission.  Left voicemail message for patient with my contact information. Unable to reach after multiple attempts, will close program at this time, can re-open if needed.     Cher Sepulveda RN BSN  O Care Manager  [phone]

## 2025-03-16 ENCOUNTER — APPOINTMENT (OUTPATIENT)
Dept: RADIOLOGY | Facility: HOSPITAL | Age: 55
End: 2025-03-16
Payer: COMMERCIAL

## 2025-03-16 ENCOUNTER — HOSPITAL ENCOUNTER (EMERGENCY)
Facility: HOSPITAL | Age: 55
Discharge: HOME | End: 2025-03-17
Payer: COMMERCIAL

## 2025-03-16 VITALS
WEIGHT: 160 LBS | HEIGHT: 62 IN | TEMPERATURE: 98.3 F | RESPIRATION RATE: 16 BRPM | BODY MASS INDEX: 29.44 KG/M2 | DIASTOLIC BLOOD PRESSURE: 89 MMHG | SYSTOLIC BLOOD PRESSURE: 128 MMHG | HEART RATE: 88 BPM | OXYGEN SATURATION: 99 %
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
## 2025-03-16 DIAGNOSIS — R04.0 EPISTAXIS DUE TO TRAUMA: Primary | ICD-10-CM

## 2025-03-16 PROCEDURE — 99284 EMERGENCY DEPT VISIT MOD MDM: CPT

## 2025-03-16 RX ORDER — ACETAMINOPHEN 325 MG/1
975 TABLET ORAL ONCE
Status: COMPLETED | OUTPATIENT
Start: 2025-03-16 | End: 2025-03-17

## 2025-03-16 ASSESSMENT — PAIN DESCRIPTION - LOCATION: LOCATION: NOSE

## 2025-03-16 ASSESSMENT — PAIN SCALES - GENERAL: PAINLEVEL_OUTOF10: 8

## 2025-03-16 ASSESSMENT — PAIN - FUNCTIONAL ASSESSMENT: PAIN_FUNCTIONAL_ASSESSMENT: 0-10

## 2025-03-16 NOTE — Clinical Note
Edmond Silvina was seen and treated in our emergency department on 3/16/2025.  She may return to work on 03/18/2025.       If you have any questions or concerns, please don't hesitate to call.      Wanda Linares PA-C

## 2025-03-17 ENCOUNTER — APPOINTMENT (OUTPATIENT)
Dept: RADIOLOGY | Facility: HOSPITAL | Age: 55
End: 2025-03-17
Payer: COMMERCIAL

## 2025-03-17 PROCEDURE — 76377 3D RENDER W/INTRP POSTPROCES: CPT | Performed by: RADIOLOGY

## 2025-03-17 PROCEDURE — 70450 CT HEAD/BRAIN W/O DYE: CPT

## 2025-03-17 PROCEDURE — 70486 CT MAXILLOFACIAL W/O DYE: CPT

## 2025-03-17 PROCEDURE — 2500000001 HC RX 250 WO HCPCS SELF ADMINISTERED DRUGS (ALT 637 FOR MEDICARE OP): Performed by: PHYSICIAN ASSISTANT

## 2025-03-17 PROCEDURE — 70450 CT HEAD/BRAIN W/O DYE: CPT | Performed by: RADIOLOGY

## 2025-03-17 PROCEDURE — 76377 3D RENDER W/INTRP POSTPROCES: CPT

## 2025-03-17 PROCEDURE — 70486 CT MAXILLOFACIAL W/O DYE: CPT | Performed by: RADIOLOGY

## 2025-03-17 RX ORDER — IBUPROFEN 600 MG/1
600 TABLET ORAL EVERY 6 HOURS PRN
Qty: 20 TABLET | Refills: 0 | Status: SHIPPED | OUTPATIENT
Start: 2025-03-17

## 2025-03-17 RX ADMIN — ACETAMINOPHEN 975 MG: 325 TABLET, FILM COATED ORAL at 00:09

## 2025-03-17 NOTE — ED NOTES
Discharge instructions reviewed with patient at this time. Pt instructed to follow up with PCP. Pt instructed to  prescriptions at pharmacy and take them as prescribed.      Gabrielle Spatz, RN  03/17/25 0118

## 2025-03-17 NOTE — ED TRIAGE NOTES
Patient was out for the evening and for unknown reasons the patient fell forward and hit her face. Patient had a bloody nose that has stopped since but nose is swollen. Patient complains of a headache and nasal pain.

## 2025-03-17 NOTE — ED PROVIDER NOTES
Chief Complaint   Patient presents with    Fall    Facial Injury     HPI:   Edmond Cool is an 54 y.o. female complicated PMH including WILLIS/MDD, asthma, HTN, GERD, HLD, T2DM, migraine disorder who presents to the ED with significant other for evaluation of nose pain and epistaxis after sustaining fall roughly 1 hour prior to arrival.  Patient says she was walking in her house, had a mechanical trip and fall and landed on her face on carpeted floor.  There was no loss of consciousness.  She is not on anticoagulants.  Denies dizziness or chest pain prior to or following the fall.  She endorses 6/10 pain over the bridge of her nose and the front of her forehead.  Denies any vision changes, speech changes, dizziness or lightheadedness, headache, numbness, tingling, extremity weakness, neck pain or stiffness, nausea.  Denies history of bleeding clotting disorder.  Has not taken any medication for pain.  Denies substance use.    Medications: Denies any  Soc HX: Works as an MA for [employer]  Allergies   Allergen Reactions    Bee Venom Protein (Honey Bee) Unknown and Shortness of breath     Severe allergy reaction to wasp    Dog Dander Unknown    Iodine Other    Keflex [Cephalexin] Other   :  Past Medical History:   Diagnosis Date    Allergy to seafood 12/15/2022    History of allergy to shellfish    Candidiasis, unspecified 12/09/2022    Yeast infection    Encounter for contraceptive management, unspecified 08/09/2021    Contraception management    Encounter for gynecological examination (general) (routine) without abnormal findings 09/11/2020    Women's annual routine gynecological examination    Essential (primary) hypertension 05/23/2019    HTN (hypertension), benign    Other specified noninflammatory disorders of vagina 12/06/2021    Vaginal irritation    Personal history of other diseases of the musculoskeletal system and connective tissue 05/23/2019    History of herniated intervertebral disc    Personal history of  other diseases of the respiratory system     History of sinusitis    Personal history of other endocrine, nutritional and metabolic disease 2019    History of hypercholesterolemia    Personal history of other infectious and parasitic diseases 2021    History of trichomonal vaginitis    Personal history of other medical treatment 2021    History of screening mammography    Personal history of urinary (tract) infections     History of urinary tract infection     Past Surgical History:   Procedure Laterality Date    BREAST BIOPSY      OTHER SURGICAL HISTORY  2019     section     Family History   Problem Relation Name Age of Onset    No Known Problems Mother      Diabetes Other      Hypertension Other      Cancer Other       Physical Exam  Vitals and nursing note reviewed.   Constitutional:       General: She is not in acute distress.     Appearance: Normal appearance. She is not ill-appearing or toxic-appearing.   HENT:      Head: Normocephalic and atraumatic.      Comments: No signs of basilar skull fracture     Right Ear: Tympanic membrane, ear canal and external ear normal.      Left Ear: Tympanic membrane, ear canal and external ear normal.      Ears:      Comments: No hemotympanum     Nose:      Comments: Dried blood bilateral nares.  No septal hematoma.  Tenderness to palpation across the nasal bridge without ecchymosis nor bony crepitus     Mouth/Throat:      Mouth: Mucous membranes are moist.   Eyes:      Extraocular Movements: Extraocular movements intact.      Pupils: Pupils are equal, round, and reactive to light.      Comments: No pain or dizziness with eye movement.  No nystagmus.  No periorbital ecchymosis.  No tenderness with palpation of the orbits.   Cardiovascular:      Rate and Rhythm: Normal rate and regular rhythm.      Pulses: Normal pulses.      Heart sounds: Normal heart sounds.   Pulmonary:      Effort: Pulmonary effort is normal. No respiratory distress.       Breath sounds: Normal breath sounds.   Abdominal:      Palpations: Abdomen is soft.   Musculoskeletal:         General: Normal range of motion.      Cervical back: Normal range of motion. No tenderness.      Comments: Symmetric strength and sensation bilateral upper and lower extremities.   Skin:     General: Skin is warm and dry.   Neurological:      General: No focal deficit present.      Mental Status: She is alert.      Cranial Nerves: No cranial nerve deficit.      Sensory: No sensory deficit.      Motor: No weakness.      Gait: Gait normal.     VS: As documented in the triage note and EMR flowsheet from this visit were reviewed.    External Records Reviewed: I reviewed recent and relevant outside records including: Reviewed urgent care note 3/7/2025.  Patient seen for flulike symptoms.  Chest x-ray normal.  COVID flu negative.  She was given Medrol Dosepak and azithromycin.      Medical Decision Making:   ED Course as of 03/17/25 0316   Sun Mar 16, 2025   2308 Vitals Reviewed: Afebrile. Normotensive. Not tachycardic nor tachypneic. No hypoxia.   [KA]   Mon Mar 17, 2025   0057     IMPRESSION:  No acute intracranial abnormality.      Mild soft tissue swelling mid frontal scalp.      No acute facial bone fracture.   [KA]   0116 Discussed results with patient.  Recommended continued supportive care.  Discussed concussion precautions.  Advise follow-up with primary care provider and return to ED for any new or worsening symptoms.  Will send ibuprofen at home with her.  Given work note. [KA]      ED Course User Index  [KA] Wanda Linares PA-C         Diagnoses as of 03/17/25 0316   Epistaxis due to trauma   Fall, initial encounter      Escalation of Care: Appropriate for outpatient management     Counseling: Spoke with the patient and discussed today´s findings, in addition to providing specific details for the plan of care and expected course.  Patient was given the opportunity to ask questions.    Discussed  return precautions and importance of follow-up.  Advised to follow-up with outpatient management.  Advised to return to the ED for changing or worsening symptoms, new symptoms, complaint specific precautions, and precautions listed on the discharge paperwork.  Educated on the common potential side effects of medications prescribed.    I advised the patient that the emergency evaluation and treatment provided today doesn't end their need for medical care. It is very important that they follow-up with their primary care provider or other specialist as instructed.    The plan of care was mutually agreed upon with the patient. The patient and/or family were given the opportunity to ask questions. All questions asked today in the ED were answered to the best of my ability with today's information.    I specifically advised the patient to return to the ED for changing or worsening symptoms, worrisome new symptoms, or for any complaint specific precautions listed on the discharge paperwork.    This patient was cared for in the setting of nationwide stress on resources and staffing.    This report was transcribed using voice recognition software.  Every effort was made to ensure accuracy, however, inadvertently computerized transcription errors may be present.       Wanda Linares PA-C  03/17/25 0316

## 2025-03-17 NOTE — DISCHARGE INSTRUCTIONS
Please continue Tylenol and/or ibuprofen as needed for pain.  Follow-up with primary care doctor within the next 2 to 5 days.  Return to ER for any new or worsening symptoms.

## 2025-03-18 ENCOUNTER — CLINICAL SUPPORT (OUTPATIENT)
Dept: AUDIOLOGY | Facility: CLINIC | Age: 55
End: 2025-03-18
Payer: COMMERCIAL

## 2025-03-18 ENCOUNTER — APPOINTMENT (OUTPATIENT)
Dept: OTOLARYNGOLOGY | Facility: CLINIC | Age: 55
End: 2025-03-18
Payer: COMMERCIAL

## 2025-03-18 VITALS — WEIGHT: 160 LBS | BODY MASS INDEX: 29.44 KG/M2 | HEIGHT: 62 IN
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
## 2025-03-18 DIAGNOSIS — H81.90 VESTIBULAR DYSFUNCTION, UNSPECIFIED LATERALITY: ICD-10-CM

## 2025-03-18 DIAGNOSIS — M54.2 NECK PAIN: ICD-10-CM

## 2025-03-18 DIAGNOSIS — R42 DIZZINESS: Primary | ICD-10-CM

## 2025-03-18 DIAGNOSIS — M43.6 NECK STIFFNESS: ICD-10-CM

## 2025-03-18 DIAGNOSIS — R51.9 ACUTE NONINTRACTABLE HEADACHE, UNSPECIFIED HEADACHE TYPE: ICD-10-CM

## 2025-03-18 PROCEDURE — 92557 COMPREHENSIVE HEARING TEST: CPT | Performed by: AUDIOLOGIST

## 2025-03-18 PROCEDURE — 1036F TOBACCO NON-USER: CPT | Performed by: NURSE PRACTITIONER

## 2025-03-18 PROCEDURE — 99214 OFFICE O/P EST MOD 30 MIN: CPT | Performed by: NURSE PRACTITIONER

## 2025-03-18 PROCEDURE — 3044F HG A1C LEVEL LT 7.0%: CPT | Performed by: NURSE PRACTITIONER

## 2025-03-18 PROCEDURE — 3048F LDL-C <100 MG/DL: CPT | Performed by: NURSE PRACTITIONER

## 2025-03-18 PROCEDURE — 3008F BODY MASS INDEX DOCD: CPT | Performed by: NURSE PRACTITIONER

## 2025-03-18 PROCEDURE — 92550 TYMPANOMETRY & REFLEX THRESH: CPT | Mod: 52 | Performed by: AUDIOLOGIST

## 2025-03-18 NOTE — PROGRESS NOTES
Chief Complaint   Patient presents with    Dizziness       HISTORY:  Edmond Cool, age 54 years, was seen for audiogram in conjunction with otolaryngology appointment on 3/18/2025.  Ms. Cool reports fall two days ago resulting in facial injury, she was seen in the ED at [facility].  She has completed two appointments with physical therapy.  There is no report of hearing loss, ear pain, ear pressure or ear drainage.  She believe she might have had bilateral PE tubes as a young child but is not certain of this.    RESULTS:  Prior to testing both external auditory canals were clear and tympanic membranes visualized    Immittance and acoustic reflexes:  Immittance testing yielded TYPE A tympanograms indicating normal middle ear function both ears  Acoustic reflexes were present 500 - 4000 Hz both ears    Audiogram:  Normal hearing levels were obtained 125 - 8000 Hz both ears  Speech reception thresholds obtained at 10 dBHL both ears  Speech discrimination scores were 100% at 40 dBHL    IMPRESSIONS:  Normal middle ear function noted both ears  Normal acoustic reflexes noted both ears  Normal hearing levels both ears    RECOMMENDATIONS:  1.  Follow up with referring provider  2.  Retest hearing levels as needed    time: 8637 - 9109

## 2025-03-18 NOTE — PROGRESS NOTES
Subjective   Patient ID: Edmond Cool is a 54 y.o. female who presents for Dizziness.  HPI  This patient is referred for evaluation of  non-vertiginous  dizziness. The patient is not accompanied by anyone. The approximate duration of her complaints is 1 month.  Patient was admitted in early February for an abscess on her right clavicle.  She was started on Keflex and developed sudden onset of imbalance.  The Keflex was then discontinued, but balance symptoms did not improve.  She was then referred for vestibular therapy.  She had to sessions with them.  Her physical therapist suggested she see me due to concern for bilateral vestibulopathy.  Patient reports that 2 days ago she fell coming out of her bathroom striking her face.  She does not recall why/how she fell.  She reports memory loss with the events.  She had imaging which ruled out any facial bone fracture, but reports severe headaches and nasal pain.  The patient describes his dizziness as pulling to the left side.  She denies any true vertigo.  When asked about ear pain, headache, phono-photophobia, visual or motion intolerance, sound or pressure induced symptoms, hearing loss, discharge from ear, tinnitus, aural fullness or autophony, the patient admits to severe headache with remote history of migraine, photo and phonophobia, motion intolerance, visual intolerance, pressure induced dizziness, autophony.  She also endorses neck pain/stiffness.     When asked about a significant past otological history including history of prior ear surgery, noise exposure, exposure to ototoxic drugs or agents, and/or family history of hearing loss, the patient admits to recreational noise exposure.  She reports many ear infections as a young child but does not believe she ever needed tubes.    Review of Systems  A comprehensive or 10 points review of the patient's constitutional, neurological, HEENT, pulmonary, cardiovascular and genito-urinary systems showed only  those mentioned in history of present illness.    Objective   Physical Exam  Constitutional: no fever, chills, weight loss or weight gain   General appearance: Appears well, well-nourished, well groomed.  Appears unwell and in pain. Patient initially covering her head with her coat due to light sensitivity.  Procedures initially covering  Communication: Normal communication   Psychiatric: Oriented to person, place and time. Normal mood and affect.   Neurologic: Cranial nerves II-XII grossly intact and symmetric bilaterally.   Head and Face:   Head: Atraumatic with no masses, lesions or scarring.   Face: Normal symmetry, no paralysis, synkinesis or facial tic. No scars or deformities.   TMJ: Positive trismus and tenderness  Eyes: Conjunctiva not edematous or erythematous   Ears: External inspection of ears with no deformity, scars or masses. Bilateral EACs clear and bilateral TMs intact with no signs of effusions   Nose: External inspection of nose: No nasal lesions, lacerations or scars.   Neck: Normal appearing, symmetric, trachea midline.   Cardiovascular: Examination of peripheral vascular system shows no clubbing or cyanosis.   Respiratory: No respiratory distress increased work of breathing. Inspection of the chest with symmetric chest expansion and normal respiratory effort.   Skin: No rashes in the head or neck    Bedside occulomotor function assessment for ocular pursuits and saccades, spontaneous nystagmus was normal.  Bilateral head thrust negative  Remaining positional and postural testing deferred by provider.    My interpretation of the audiogram done today is normal hearing with excellent word recognition scores and normal tympanograms bilaterally.    Assessment/Plan       This patient presents for initial evaluation of acute acquired dizziness, headache, neck pain, neck stiffness, fall.    Reassurance given that otologic exam and audiogram today are both normal.  We discussed that Keflex is not  reportedly ototoxic, but her presentation certainly is suspicious for this.  I recommended further evaluation with balance function testing.  I am happy to discuss those results over the phone.  Her presentation today is more consistent with migraine but this is likely due to her pain from hitting her face during her fall.  I recommended she continue with cold compresses to the nose/face and scheduled ibuprofen.  Patient is in agreement with the plan.  All questions were answered to patient's satisfaction.      30 minutes was spent on this patient's visit. More than 50% of that time was spent in counseling regarding the possible etiologies, test results, treatment options and coordinating care.    This note was created using speech recognition transcription software. Despite proofreading, several typographical errors might be present that might affect the meaning of the content. Please call with any questions.         GOLDEN Wang-CNP 03/18/25 1:29 PM

## 2025-03-19 ENCOUNTER — OFFICE VISIT (OUTPATIENT)
Dept: PULMONOLOGY | Facility: CLINIC | Age: 55
End: 2025-03-19
Payer: COMMERCIAL

## 2025-03-19 VITALS
SYSTOLIC BLOOD PRESSURE: 153 MMHG | HEART RATE: 77 BPM | OXYGEN SATURATION: 97 % | RESPIRATION RATE: 16 BRPM | WEIGHT: 159 LBS | HEIGHT: 62 IN | BODY MASS INDEX: 29.26 KG/M2 | DIASTOLIC BLOOD PRESSURE: 83 MMHG

## 2025-03-19 DIAGNOSIS — J45.50 SEVERE PERSISTENT ASTHMA, UNSPECIFIED WHETHER COMPLICATED (MULTI): ICD-10-CM

## 2025-03-19 DIAGNOSIS — J45.21 MILD INTERMITTENT ASTHMATIC BRONCHITIS WITH ACUTE EXACERBATION (HHS-HCC): ICD-10-CM

## 2025-03-19 PROCEDURE — 3048F LDL-C <100 MG/DL: CPT | Performed by: INTERNAL MEDICINE

## 2025-03-19 PROCEDURE — 1036F TOBACCO NON-USER: CPT | Performed by: INTERNAL MEDICINE

## 2025-03-19 PROCEDURE — 3044F HG A1C LEVEL LT 7.0%: CPT | Performed by: INTERNAL MEDICINE

## 2025-03-19 PROCEDURE — 99214 OFFICE O/P EST MOD 30 MIN: CPT | Performed by: INTERNAL MEDICINE

## 2025-03-19 PROCEDURE — 3077F SYST BP >= 140 MM HG: CPT | Performed by: INTERNAL MEDICINE

## 2025-03-19 PROCEDURE — 3008F BODY MASS INDEX DOCD: CPT | Performed by: INTERNAL MEDICINE

## 2025-03-19 PROCEDURE — RXMED WILLOW AMBULATORY MEDICATION CHARGE

## 2025-03-19 PROCEDURE — 3079F DIAST BP 80-89 MM HG: CPT | Performed by: INTERNAL MEDICINE

## 2025-03-19 RX ORDER — PREDNISONE 20 MG/1
40 TABLET ORAL DAILY
Qty: 10 TABLET | Refills: 0 | Status: SHIPPED | OUTPATIENT
Start: 2025-03-19 | End: 2025-03-24

## 2025-03-19 RX ORDER — ALBUTEROL SULFATE 90 UG/1
2 INHALANT RESPIRATORY (INHALATION) EVERY 4 HOURS PRN
Qty: 6.7 G | Refills: 2 | Status: SHIPPED | OUTPATIENT
Start: 2025-03-19 | End: 2026-03-19

## 2025-03-19 RX ORDER — FLUTICASONE FUROATE, UMECLIDINIUM BROMIDE AND VILANTEROL TRIFENATATE 200; 62.5; 25 UG/1; UG/1; UG/1
1 POWDER RESPIRATORY (INHALATION) DAILY
Qty: 60 EACH | Refills: 3 | Status: SHIPPED | OUTPATIENT
Start: 2025-03-19

## 2025-03-19 ASSESSMENT — ASTHMA QUESTIONNAIRES
QUESTION_1 LAST FOUR WEEKS HOW MUCH OF THE TIME DID YOUR ASTHMA KEEP YOU FROM GETTING AS MUCH DONE AT WORK, SCHOOL OR AT HOME: A LITTLE OF THE TIME
QUESTION_3 LAST FOUR WEEKS HOW OFTEN DID YOUR ASTHMA SYMPTOMS (WHEEZING, COUGHING, SHORTNESS OF BREATH, CHEST TIGHTNESS OR PAIN) WAKE YOU UP AT NIGHT OR EARLIER THAN USUAL IN THE MORNING: 2-3 NIGHTS A WEEK
QUESTION_5 LAST FOUR WEEKS HOW WOULD YOU RATE YOUR ASTHMA CONTROL: SOMEWHAT CONTROLLED
QUESTION_4 LAST FOUR WEEKS HOW OFTEN HAVE YOU USED YOUR RESCUE INHALER OR NEBULIZER MEDICATION (SUCH AS ALBUTEROL): 3 OR MORE TIMES PER DAY
QUESTION_2 LAST FOUR WEEKS HOW OFTEN HAVE YOU HAD SHORTNESS OF BREATH: 3 TO 6 TIMES A WEEK
ACT_TOTALSCORE: 13

## 2025-03-19 NOTE — PATIENT INSTRUCTIONS
Dear Edmond Cool It was nice seeing you today. We discussed the following:   You are suffering from an asthma exacerbation.  Recommend a 5-day course of prednisone 20 mg/day  We will switch her inhaler from Advair to Trelegy  Continue Singulair  You continue to not be interested in Biologics at this point but will readdress in our next visit  I would like to see you back in follow-up in June.  I would like to have a repeat chest CT and a pulmonary function test to follow-up on the mild scarring on your lungs       For scheduling purposes:    Call [phone] to schedule a breathing or a walking test     Call [phone] to schedule  EKG's, Echocardiograms and Cardiopulmonary Stress Tests.    Call [phone] to schedule Radiology tests such as Nuclear Medicine Stress Tests, CT Scans, and MRI's.    Should you have any questions Please Call my assistant Sally Clayton at [phone] or our pulmonary nurse Dariela Wen [phone].

## 2025-03-19 NOTE — PROGRESS NOTES
"Asthma severe persistent   Interval 3/19/2025  On 3/8 she had a urgent care visit for wheezing and fever.  Her rapid flu and COVID were negative.  Her chest x-ray was largely on chart.  She was given a Medrol Dosepak and a course of antibiotic.  She feels improved.  Her ACT today remains 13 however.  She is using her albuterol more frequently.  She is not interested in starting any more Biologics and did not start the test prior.  She was unable to get the Spiriva because of cost.  03/16 ED visit mechanical fall and epistaxis.    Asthma maintenance meds  Advair 500   No tiotropium  Singulair   Did not start the tezpire and is not interested in more Biologics.    Pe:  /83   Pulse 77   Resp 16   Ht 1.575 m (5' 2\")   Wt 72.1 kg (159 lb)   SpO2 97%   BMI 29.08 kg/m²   Lungs bilateral expiratory wheezes    Interval 12/11/2024    She is doing a lot better.  She finished the 5 days of prednisone.  She continues to take all her other asthma regimen.  ACT 10   PE: /82   Pulse 76   Temp 36.5 °C (97.7 °F)   Resp 18   Ht 1.549 m (5' 1\")   Wt 72.1 kg (159 lb)   SpO2 97%   BMI 30.04 kg/m²   Lungs:CTA       Interval 12/3/2024  Stopped the Fasenra that was started in June 2024 after 4 weeks or so because of lack of benefit.  She did not have a problem the whole summer.  Around Labor Day she went to [place] to visit her son and it was cold there.  She went to emergency room and was diagnosed with an asthma exacerbation prompting prednisone 20 mg for 5 days.  At the end of November she had an episode of recurrent vomiting prompting an emergency room visit.  An abdomen CT showed enteritis.  She received supportive care and recovered.  At the same time it showed infiltrate in the right middle lobe that were present on her old chest CT but given her persistent cough she was diagnosed with pneumonia and given a course of doxycycline.  She continues to cough dark brown phlegm for the last few months.  She is " "currently using the Advair 500/50.  Unfortunately the Spiriva was not covered by her insurance. Also use the Singulair. No biologics     I reviewed the emergency visit notes and the testing performed.  She was seen in the emergency room on 1127 for abdominal pain.  A CT was completed.  It showed some of the infiltrates that were previously seen on her chest CTs in the right middle lobe.  She was suspected to have pneumonia and was given a course of antibiotic/doxycycline.  She continues to be wheezy and complaining of a productive cough.  We started her on prednisone 20 mg yesterday.  MMRC 3, ACT not completed   PE:  /81   Pulse 75   Resp 18   Ht 1.549 m (5' 1\")   Wt 72.1 kg (159 lb)   SpO2 97%   BMI 30.04 kg/m²   Lungs: Bilateral wheezes     Interval 06/20  Finished 2 weeks of prednisone.  It helped but not like it used to.  She was still using her nebulizer about 3 times per day and before bedtime.  No nocturnal awakening.  She feels the hot humidity are bothering her.      PE:  /82 (BP Location: Left arm, Patient Position: Sitting)   Pulse 74   Temp 36.8 °C (98.3 °F) (Temporal)   Wt 73.9 kg (163 lb)   SpO2 95% Comment: RA  BMI 30.80 kg/m²   Lungs:CTA     Interval 6/5/2024  ACT 14. Montelukast, Advair  500/50   Using her albuterol frequently   Dupixent since jan 2024 not much improvement   Last pred burst in Jan 2024. No ER  visit or hospitalization     PE:  Temp 36.3 °C (97.4 °F)   Resp 22   Wt 69.4 kg (153 lb)   LMP  (LMP Unknown)   SpO2 93% Comment: Room air  BMI 28.91 kg/m²   Lungs: CTA     Interval 1/09/2024  Patient states she has \"gone downhill\" since last visit. Had \"double pneumonia\" and \"double asthma attack.\" Feels as though lungs have just been damaged as a result. Was also working in a building with mold, had to switch jobs. Reports \"asthma attack\" just before [place] with shortness of breath and wheezing. Also having increasing shortness of breath with exertion, that " causes light-headedness. Walked with pulse ox yesterday and dropped to 89% SaO2.     Is using albuterol rescue inhaler every 4 hours. Also using Advair and Montelukast. She did put herself on steroids last week with rescue pack and this helped with shortness of breath but then symptoms worsened as soon as she stopped steroids.     Diag testing   PFT last 2021 ratio normal.  FEV1 1.77.  FVC 2.05, DLCO 16 pos BDR in the small airways no obstruction.     PFT 06 2024 ratio normal.  FEV1 1.91, FVC 2.2, DLCO 15.  No response to bronchodilator.  Feno 9      IgE 500 2022, 347 2023, neg aspergillus iGe and Igg  Eosinos 300   Eosinophils Absolute (x10E9/L)   Date Value   12/09/2022 0.30   03/11/2022 0.20   12/29/2020 0.17     IgE (IU/mL)   Date Value   03/16/2023 347 (H)     Aspergillus ige negative   === 05/30/23 ===    CT CHEST WO IV CONTRAST    - Impression -  1. No significant interval change in the lungs compared to CT  03/13/2023, as described above. Findings may represent sequela of  prior infectious/inflammatory process such as reported recent COVID  pneumonia requiring hospitalization. Small airways disease and  atypical infectious processes remain differential considerations. A  component of organizing pneumonia is again not excluded.  2. Stable borderline and mildly enlarged mediastinal and upper  abdominal lymph nodes, likely reactive.  3. Small sliding hiatal hernia.  4. Stable 3 mm nodule in the right upper lobe.    I personally reviewed the images/study and I agree with the findings as stated by resident physician Dr. Daniel Rivas.    Chest CT 06 2024   1.Redemonstrated bilateral bandlike opacities and reticulations with  associated subtle ground-glass opacities bilaterally. Interval slight  worsening patchy ground-glass opacities within the right upper lobe.  Findings again may represent sequelae of COVID with possible  organizing pneumonia. Superimposed atypical infectious/inflammatory  process not  excluded.  2. Similar appearance of mildly enlarged mediastinal lymph nodes,  likely reactive in nature.  3. Additional stable chronic findings as described above.    Sinus CT  CT 06/2024  wnl    === 11/27/24 ===    CT ABDOMEN PELVIS W IV CONTRAST    - Impression -  Basilar parenchymal opacities and parenchymal bands.  Please correlate  for component of pneumonia.  Enteritis.  Sigmoid colon diverticulosis without evidence of diverticulitis.  Small hiatal hernia.  Small hypodensity within the uterus suggests uterine fibroid.      Impression and Plan    54-year old AA female (ex smoker, quit 22 years, <5 pack years), h/o asthma (since age 25), htn, hypercholesteremia, here for evaluation of asthma symptoms that have been uncontrolled of late.  Patient experience shortness of breath and cough constantly. She had covid about 2 years ago had to be hospitalized for 1 day.  Has been on steroids on and off. She has partial relief from the steroids     1) severe persistent asthma - Ige levels >500 -->323, eosi 150-300, FeNO O  9 and 5 but on prednisone. ANCA negative, Aspergillus IgE and igG negative   Continue albuterol nebs twice daily and up to 4 times as needed.  We will switch to Trelegy from Advair to add a LAMA  Continue montelukast  Dupilumab Jan-June 2024 no relief, Fasenra June 2024-July no relief.  Not interested in more Biologics  Patient to follow up with her allergist           2) Bilateral interstitial infiltrates - CT scan of the chest 05 2023 showed bilateral mild bronchial wall thickening and mucus plugging, minimal air trapping 3 mm RUL lung nodule. Patchy lobular bandlike areas of opacity in bilateral lungs.Repeat CT 06/2024 shows persistent patchy infiltrates, likely residual from prior COVID.? Slighly worse in 06 2024. (Aspergillus IgE and IgG negative)  Repeat Chest CT and PFT       3) Allergic rhinitis and post nasal drip  - Continue Flonase 1 sq in each nostril twice daily  - Continue to follow up  with allergy.    4) Asthma exacerbation--> pred burst

## 2025-03-24 ENCOUNTER — APPOINTMENT (OUTPATIENT)
Dept: PRIMARY CARE | Facility: CLINIC | Age: 55
End: 2025-03-24
Payer: COMMERCIAL

## 2025-03-24 VITALS
BODY MASS INDEX: 30.91 KG/M2 | WEIGHT: 168 LBS | HEART RATE: 80 BPM | DIASTOLIC BLOOD PRESSURE: 88 MMHG | SYSTOLIC BLOOD PRESSURE: 150 MMHG | HEIGHT: 62 IN

## 2025-03-24 DIAGNOSIS — I10 ESSENTIAL HYPERTENSION, BENIGN: ICD-10-CM

## 2025-03-24 DIAGNOSIS — Z12.31 SCREENING MAMMOGRAM, ENCOUNTER FOR: Primary | ICD-10-CM

## 2025-03-24 DIAGNOSIS — S06.0X0D CONCUSSION WITHOUT LOSS OF CONSCIOUSNESS, SUBSEQUENT ENCOUNTER: ICD-10-CM

## 2025-03-24 PROCEDURE — 3008F BODY MASS INDEX DOCD: CPT | Performed by: FAMILY MEDICINE

## 2025-03-24 PROCEDURE — 3044F HG A1C LEVEL LT 7.0%: CPT | Performed by: FAMILY MEDICINE

## 2025-03-24 PROCEDURE — 3077F SYST BP >= 140 MM HG: CPT | Performed by: FAMILY MEDICINE

## 2025-03-24 PROCEDURE — 3079F DIAST BP 80-89 MM HG: CPT | Performed by: FAMILY MEDICINE

## 2025-03-24 PROCEDURE — 3048F LDL-C <100 MG/DL: CPT | Performed by: FAMILY MEDICINE

## 2025-03-24 PROCEDURE — 1036F TOBACCO NON-USER: CPT | Performed by: FAMILY MEDICINE

## 2025-03-24 PROCEDURE — 99214 OFFICE O/P EST MOD 30 MIN: CPT | Performed by: FAMILY MEDICINE

## 2025-03-24 ASSESSMENT — ENCOUNTER SYMPTOMS
HEADACHES: 1
DEPRESSION: 0
RESPIRATORY NEGATIVE: 1
OCCASIONAL FEELINGS OF UNSTEADINESS: 1
CONSTITUTIONAL NEGATIVE: 1
DIZZINESS: 1
MUSCULOSKELETAL NEGATIVE: 1
CARDIOVASCULAR NEGATIVE: 1
GASTROINTESTINAL NEGATIVE: 1
LOSS OF SENSATION IN FEET: 0

## 2025-03-24 NOTE — PROGRESS NOTES
"Subjective   Patient ID: Edmond Cool is a 54 y.o. female who presents for No chief complaint on file..    HPI pt had a fall episode where she hit her forehead and nose coming out of bathroom , pt had imaging and eval w ent and er, head scan nromal bugt having sig exacerbations of her vestibular dysequilibrium and sig headaches and photophobia since this injury    Review of Systems   Constitutional: Negative.    HENT: Negative.     Respiratory: Negative.     Cardiovascular: Negative.    Gastrointestinal: Negative.    Musculoskeletal: Negative.    Neurological:  Positive for dizziness and headaches.        Some photophobia   Headaches and photophobia since fall w head inj  Dysequilibrium sig worse since fall        Objective   /88   Pulse 80   Ht 1.575 m (5' 2\")   Wt 76.2 kg (168 lb)   BMI 30.73 kg/m²     Physical Exam  Vitals reviewed.   Constitutional:       Appearance: Normal appearance. She is normal weight.   Eyes:      Extraocular Movements: Extraocular movements intact.      Conjunctiva/sclera: Conjunctivae normal.      Pupils: Pupils are equal, round, and reactive to light.   Cardiovascular:      Rate and Rhythm: Normal rate and regular rhythm.      Pulses: Normal pulses.      Heart sounds: Normal heart sounds.   Pulmonary:      Effort: Pulmonary effort is normal.      Breath sounds: Normal breath sounds.   Abdominal:      General: Bowel sounds are normal.      Palpations: Abdomen is soft.   Musculoskeletal:         General: Normal range of motion.   Skin:     General: Skin is warm and dry.   Neurological:      General: No focal deficit present.      Mental Status: She is alert and oriented to person, place, and time. Mental status is at baseline.      Comments: Sig headaches, no nystagmus on exam and cranial nerve testing normal          Assessment/Plan   Problem List Items Addressed This Visit    None  Visit Diagnoses         Codes    Screening mammogram, encounter for    -  Primary Z12.31 "    Relevant Orders    BI mammo bilateral screening tomosynthesis        Pt w concussion sp fall 1 week ago has classsic symptoms of headaches w mild nausea, worsening vertgigious symptomsms, proiro to fall they wer mostly gone, and some photophobia and head pain worse w exertion or prolonged screen time  Pt put on concussion protocol observations and information and will report symptomss in 1 wk  Due for mammogram , scheduled for tomm  Htn stable ccc  Etoh use pt back to abstinence now for 3 wks and attending aa

## 2025-03-24 NOTE — PROGRESS NOTES
Pt comes in for fu from the ER. Pt had an episode where she got dizzy and passed out. Pt was told she could have had a mild concussion. Pt fu with ent this past Tuesday. Pt set up for balance testing.

## 2025-03-25 ENCOUNTER — HOSPITAL ENCOUNTER (OUTPATIENT)
Dept: RADIOLOGY | Facility: CLINIC | Age: 55
Discharge: HOME | End: 2025-03-25
Payer: COMMERCIAL

## 2025-03-25 DIAGNOSIS — Z12.31 ENCOUNTER FOR SCREENING MAMMOGRAM FOR MALIGNANT NEOPLASM OF BREAST: ICD-10-CM

## 2025-03-25 PROCEDURE — 77063 BREAST TOMOSYNTHESIS BI: CPT | Performed by: RADIOLOGY

## 2025-03-25 PROCEDURE — 77067 SCR MAMMO BI INCL CAD: CPT

## 2025-03-25 PROCEDURE — 77067 SCR MAMMO BI INCL CAD: CPT | Performed by: RADIOLOGY

## 2025-03-26 ENCOUNTER — APPOINTMENT (OUTPATIENT)
Dept: PHARMACY | Facility: HOSPITAL | Age: 55
End: 2025-03-26
Payer: COMMERCIAL

## 2025-03-26 ENCOUNTER — PHARMACY VISIT (OUTPATIENT)
Dept: PHARMACY | Facility: CLINIC | Age: 55
End: 2025-03-26
Payer: COMMERCIAL

## 2025-03-26 DIAGNOSIS — J45.21 MILD INTERMITTENT ASTHMATIC BRONCHITIS WITH ACUTE EXACERBATION (HHS-HCC): ICD-10-CM

## 2025-03-26 DIAGNOSIS — J45.50 SEVERE PERSISTENT ASTHMA, UNSPECIFIED WHETHER COMPLICATED (MULTI): ICD-10-CM

## 2025-03-26 NOTE — PROGRESS NOTES
"  Pharmacist Clinic: Pulmonary Management    Edmond Cool is a 54 y.o. female was referred to Clinical Pharmacy Team for Pulmonary assessment.   Referring Provider: Luis Mcclendon MD  Last visit: 3/19/25  Next visit: 6/18/25    Subjective   Allergies   Allergen Reactions    Bee Venom Protein (Honey Bee) Unknown and Shortness of breath     Severe allergy reaction to wasp    Dog Dander Unknown    Iodine Other    Keflex [Cephalexin] Other       HPI    PULMONARY ASSESSMENT  Patient has been diagnosed with: Asthma    Current regimen:  Trelegy 200-62.5-25    Historical regimen:  Advair 500-50 BID    Appropriate Inhaler Technique: yes    ASTHMA CONTROL:  -Primary symptoms: dyspnea  -Symptoms/week: > 2 days/week but not daily  -Nighttime awakenings: < or = 2 times/month   -JOSH use: > 2 days/week but not daily  -Interference with normal activity: minor limitation  -Asthma Exacerbations (requiring oral steroids): 0-1/year      Objective   There were no vitals taken for this visit.    Secondary Prevention (vaccines):  -Influenza: Date [2024]  -PCV20: Date [Recommended]  -COVID: Date [2022]  -RSV: Date [Recommended]  -TDAP: Date [2009]  -Shingrix: Date [Recommended]    Pulmonary Functions Testing Results:  No results found for: \"FEV1\", \"FVC\", \"EXJ7HMC\", \"TLC\", \"DLCO\"    Lab Review  Lab Results   Component Value Date    BILITOT 0.3 02/07/2025    CALCIUM 8.9 02/08/2025    CO2 26 02/08/2025     02/08/2025    CREATININE 0.53 02/08/2025    GLUCOSE 85 02/08/2025    ALKPHOS 150 (H) 02/07/2025    K 4.0 02/08/2025    PROT 7.6 02/07/2025     02/08/2025    AST 58 (H) 02/07/2025    ALT 69 (H) 02/07/2025    BUN 11 02/08/2025    ANIONGAP 10 02/08/2025    MG 1.96 02/07/2025    PHOS 3.8 08/18/2020     (H) 03/11/2022    ALBUMIN 4.4 02/07/2025    LIPASE 47 11/27/2024    GFRF >90 07/01/2023    GFRMALE CANCELED 03/19/2023     Hemoglobin   Date Value Ref Range Status   02/08/2025 14.1 12.0 - 16.0 g/dL Final     WBC " "  Date Value Ref Range Status   02/08/2025 4.3 (L) 4.4 - 11.3 x10*3/uL Final     Platelets   Date Value Ref Range Status   02/08/2025 218 150 - 450 x10*3/uL Final       The 10-year ASCVD risk score (Tommie KIRKPATRICK, et al., 2019) is: 12.6%    Values used to calculate the score:      Age: 54 years      Sex: Female      Is Non- : Yes      Diabetic: Yes      Tobacco smoker: No      Systolic Blood Pressure: 150 mmHg      Is BP treated: Yes      HDL Cholesterol: 57.1 mg/dL      Total Cholesterol: 137 mg/dL    Current Outpatient Medications   Medication Instructions    albuterol 90 mcg/actuation inhaler INHALE 2 PUFFS BY MOUTH EVERY 4 HOURS AS NEEDED FOR WHEEZING    alcohol swabs (Alcohol Pads) pads, medicated Use as directed for diabetes care    amLODIPine (NORVASC) 5 mg, oral, Daily    azelastine (Astelin) 137 mcg (0.1 %) nasal spray 1 spray, Each Nostril, 2 times daily, Use in each nostril as directed    BD Ultra-Fine April Pen Needle 32 gauge x 5/32\" needle     blood sugar diagnostic (Accu-Chek Guide test strips) strip Use as directed to test sugars up to 3 times daily    blood-glucose meter (Accu-Chek Guide Glucose Meter) misc Use to check glucose as directed    fluticasone (Flonase) 50 mcg/actuation nasal spray USE 1 SPRAY IN EACH NOSTRIL EVERY 12 HOURS    fluticasone propion-salmeteroL (Advair Diskus) 500-50 mcg/dose diskus inhaler 1 puff, inhalation, 2 times daily RT, Rinse mouth with water after use to reduce aftertaste and incidence of candidiasis. Do not swallow.    fluticasone-umeclidin-vilanter (Trelegy Ellipta) 200-62.5-25 mcg blister with device 1 puff, inhalation, Daily, Rinse mouth with water after use to reduce aftertaste and incidence of candidiasis. Do not swallow.    ibuprofen 600 mg, oral, Every 6 hours PRN    lancets (Accu-Chek Softclix Lancets) misc 1 each, miscellaneous, Daily    magnesium 30 mg, 2 times daily    meloxicam (MOBIC) 15 mg, oral, Daily    montelukast (SINGULAIR) 10 " mg, oral, Nightly    Ozempic 1 mg, subcutaneous, Every 7 days    Ozempic 0.5 mg, subcutaneous, Every 7 days    pantoprazole (ProtoNix) 40 mg EC tablet TAKE 1 TABLET BY MOUTH ONCE DAILY    PARoxetine (PAXIL) 10 mg, oral, Every morning    rosuvastatin (Crestor) 20 mg tablet TAKE 1 TABLET BY MOUTH ONCE DAILY    vitamin E 45 mg (100 unit) capsule Daily       Drug Interactions:  None requiring intervention    Affordability/Accessibility:  No issues identified    Assessment/Plan   Problem List Items Addressed This Visit       Asthmatic bronchitis with exacerbation ([facility])    Relevant Orders    Referral to Clinical Pharmacy    Severe persistent asthma    Relevant Orders    Referral to Clinical Pharmacy       ASSESSMENT:  Generally controlled asthma previously on Advair 500-50 recently changed to Trelegy due to cost.     PLAN:  Continue current therapy; monitor benefit   Counseled on administration and side effect mgmt  Follow up 4/23/25 @ 1320    Jonatan Salinas RPh    Continue all meds under the continuation of care with the referring provider and clinical pharmacy team.    Verbal consent to manage patient's drug therapy was obtained from the patient. They were informed they may decline to participate or withdraw from participation in pharmacy services at any time.

## 2025-03-27 DIAGNOSIS — E09.65 DRUG OR CHEMICAL INDUCED DIABETES MELLITUS WITH HYPERGLYCEMIA, UNSPECIFIED WHETHER LONG TERM INSULIN USE: ICD-10-CM

## 2025-03-27 DIAGNOSIS — K21.9 GASTROESOPHAGEAL REFLUX DISEASE WITHOUT ESOPHAGITIS: ICD-10-CM

## 2025-03-27 PROCEDURE — RXMED WILLOW AMBULATORY MEDICATION CHARGE

## 2025-03-27 RX ORDER — BLOOD SUGAR DIAGNOSTIC
STRIP MISCELLANEOUS
Qty: 300 EACH | Refills: 3 | Status: SHIPPED | OUTPATIENT
Start: 2025-03-27

## 2025-03-28 PROCEDURE — RXMED WILLOW AMBULATORY MEDICATION CHARGE

## 2025-03-28 RX ORDER — PANTOPRAZOLE SODIUM 40 MG/1
40 TABLET, DELAYED RELEASE ORAL DAILY
Qty: 90 TABLET | Refills: 0 | Status: SHIPPED | OUTPATIENT
Start: 2025-03-28 | End: 2026-03-28

## 2025-04-01 ENCOUNTER — PHARMACY VISIT (OUTPATIENT)
Dept: PHARMACY | Facility: CLINIC | Age: 55
End: 2025-04-01
Payer: COMMERCIAL

## 2025-04-01 ENCOUNTER — APPOINTMENT (OUTPATIENT)
Dept: AUDIOLOGY | Facility: CLINIC | Age: 55
End: 2025-04-01
Payer: COMMERCIAL

## 2025-04-03 ENCOUNTER — APPOINTMENT (OUTPATIENT)
Dept: BEHAVIORAL HEALTH | Facility: CLINIC | Age: 55
End: 2025-04-03
Payer: COMMERCIAL

## 2025-04-03 DIAGNOSIS — F33.0 MAJOR DEPRESSIVE DISORDER, RECURRENT EPISODE, MILD (CMS-HCC): Primary | ICD-10-CM

## 2025-04-03 PROCEDURE — 90837 PSYTX W PT 60 MINUTES: CPT | Performed by: PSYCHIATRY & NEUROLOGY

## 2025-04-03 PROCEDURE — 3044F HG A1C LEVEL LT 7.0%: CPT | Performed by: PSYCHIATRY & NEUROLOGY

## 2025-04-03 PROCEDURE — 3048F LDL-C <100 MG/DL: CPT | Performed by: PSYCHIATRY & NEUROLOGY

## 2025-04-04 NOTE — PROGRESS NOTES
Time: 4:05 PM to 4:58 PM  Reason for Care: F33.0, major depressive disorder.  Method of Therapy: Cognitive therapy; supportive and encouraging.  Therapy Summary: Pt. was euthymic and her affect was mood congruent; pt. openly shared about her grandmother being her best friend and who passed away in ; patient reported that the day her grandmother  she went to the beauty shop and told her grandmother I will be right back and when she got home her grandma had passed; patient freely shared about her best friend completing suicide in  and despite him being andrea he was the godfather of her son; patient reported that she never fully grieved either death; patient worked on learning and understanding the 5 stages of grief, and discussed and processed her thoughts and feelings on the different stages; Provider provided patient with a safe place to freely share their issues and concerns openly; Provider offered active listening and empathic understanding to build trust and foster communication; Provider utilized socratic questioning to uncover assumptions, open up issues and problems, and cause client to think deeper; Provider assisted patient with discussing and processing their thoughts and feelings about the 5 stages of grief; Provider supported and encouraged client.  Identified Goals and Objectives: Processing distressing thoughts and implementing healthier coping skills.  Response to Therapy: Pt. was engaged.  Treatment Plan and Process: Continue with above stated goals; follow up 2 weeks.

## 2025-04-10 ENCOUNTER — APPOINTMENT (OUTPATIENT)
Dept: BEHAVIORAL HEALTH | Facility: CLINIC | Age: 55
End: 2025-04-10
Payer: COMMERCIAL

## 2025-04-14 ENCOUNTER — HOSPITAL ENCOUNTER (OUTPATIENT)
Dept: RESPIRATORY THERAPY | Facility: HOSPITAL | Age: 55
Discharge: HOME | End: 2025-04-14
Payer: COMMERCIAL

## 2025-04-14 ENCOUNTER — APPOINTMENT (OUTPATIENT)
Dept: BEHAVIORAL HEALTH | Facility: CLINIC | Age: 55
End: 2025-04-14
Payer: COMMERCIAL

## 2025-04-14 DIAGNOSIS — J45.50 SEVERE PERSISTENT ASTHMA, UNSPECIFIED WHETHER COMPLICATED (MULTI): ICD-10-CM

## 2025-04-14 PROCEDURE — 94729 DIFFUSING CAPACITY: CPT | Performed by: INTERNAL MEDICINE

## 2025-04-14 PROCEDURE — 94060 EVALUATION OF WHEEZING: CPT

## 2025-04-14 PROCEDURE — 94060 EVALUATION OF WHEEZING: CPT | Performed by: INTERNAL MEDICINE

## 2025-04-15 ENCOUNTER — HOSPITAL ENCOUNTER (OUTPATIENT)
Dept: RADIOLOGY | Facility: CLINIC | Age: 55
Discharge: HOME | End: 2025-04-15
Payer: COMMERCIAL

## 2025-04-15 ENCOUNTER — PHARMACY VISIT (OUTPATIENT)
Dept: PHARMACY | Facility: CLINIC | Age: 55
End: 2025-04-15
Payer: COMMERCIAL

## 2025-04-15 DIAGNOSIS — J45.50 SEVERE PERSISTENT ASTHMA, UNSPECIFIED WHETHER COMPLICATED (MULTI): ICD-10-CM

## 2025-04-15 PROCEDURE — 71250 CT THORAX DX C-: CPT

## 2025-04-15 PROCEDURE — RXMED WILLOW AMBULATORY MEDICATION CHARGE

## 2025-04-17 ENCOUNTER — APPOINTMENT (OUTPATIENT)
Dept: BEHAVIORAL HEALTH | Facility: CLINIC | Age: 55
End: 2025-04-17
Payer: COMMERCIAL

## 2025-04-22 ENCOUNTER — TELEPHONE (OUTPATIENT)
Dept: PULMONOLOGY | Facility: HOSPITAL | Age: 55
End: 2025-04-22
Payer: COMMERCIAL

## 2025-04-22 NOTE — TELEPHONE ENCOUNTER
Spoke with pt regarding her CT results. Per Dr. Mcclendon, CT shows some progression. Dr. Mcclendon would like to see the pt next week to discuss. Pt was updated on results and verbalized understanding. Pt informed that Dr. Mcclendon would like to see her next week and the office will call her with an update once date is established. Pt verbalized understanding.

## 2025-04-23 ENCOUNTER — APPOINTMENT (OUTPATIENT)
Dept: PHARMACY | Facility: HOSPITAL | Age: 55
End: 2025-04-23
Payer: COMMERCIAL

## 2025-04-29 ENCOUNTER — APPOINTMENT (OUTPATIENT)
Dept: ENDOCRINOLOGY | Facility: CLINIC | Age: 55
End: 2025-04-29
Payer: COMMERCIAL

## 2025-04-29 ENCOUNTER — OFFICE VISIT (OUTPATIENT)
Dept: PULMONOLOGY | Facility: CLINIC | Age: 55
End: 2025-04-29
Payer: COMMERCIAL

## 2025-04-29 VITALS
OXYGEN SATURATION: 96 % | SYSTOLIC BLOOD PRESSURE: 130 MMHG | WEIGHT: 165 LBS | HEIGHT: 62 IN | HEART RATE: 84 BPM | BODY MASS INDEX: 30.36 KG/M2 | DIASTOLIC BLOOD PRESSURE: 75 MMHG

## 2025-04-29 DIAGNOSIS — E66.811 CLASS 1 OBESITY DUE TO EXCESS CALORIES WITH SERIOUS COMORBIDITY AND BODY MASS INDEX (BMI) OF 30.0 TO 30.9 IN ADULT: ICD-10-CM

## 2025-04-29 DIAGNOSIS — R93.89 ABNORMAL CHEST CT: Primary | ICD-10-CM

## 2025-04-29 DIAGNOSIS — E78.5 HYPERLIPIDEMIA DUE TO TYPE 2 DIABETES MELLITUS (MULTI): ICD-10-CM

## 2025-04-29 DIAGNOSIS — Z01.818 PRE-OP TESTING: ICD-10-CM

## 2025-04-29 DIAGNOSIS — R91.8 LUNG INFILTRATE: ICD-10-CM

## 2025-04-29 DIAGNOSIS — E11.65 UNCONTROLLED TYPE 2 DIABETES MELLITUS WITH HYPERGLYCEMIA, WITHOUT LONG-TERM CURRENT USE OF INSULIN: Primary | ICD-10-CM

## 2025-04-29 DIAGNOSIS — R91.8 LUNG INFILTRATE ON CT: Primary | ICD-10-CM

## 2025-04-29 DIAGNOSIS — E11.9 TYPE 2 DIABETES MELLITUS WITHOUT COMPLICATION, UNSPECIFIED WHETHER LONG TERM INSULIN USE: ICD-10-CM

## 2025-04-29 DIAGNOSIS — E11.69 HYPERLIPIDEMIA DUE TO TYPE 2 DIABETES MELLITUS (MULTI): ICD-10-CM

## 2025-04-29 DIAGNOSIS — R59.0 MEDIASTINAL LYMPHADENOPATHY: Primary | ICD-10-CM

## 2025-04-29 DIAGNOSIS — E66.09 CLASS 1 OBESITY DUE TO EXCESS CALORIES WITH SERIOUS COMORBIDITY AND BODY MASS INDEX (BMI) OF 30.0 TO 30.9 IN ADULT: ICD-10-CM

## 2025-04-29 DIAGNOSIS — I10 HYPERTENSION, UNSPECIFIED TYPE: ICD-10-CM

## 2025-04-29 PROCEDURE — 99214 OFFICE O/P EST MOD 30 MIN: CPT | Performed by: INTERNAL MEDICINE

## 2025-04-29 PROCEDURE — RXMED WILLOW AMBULATORY MEDICATION CHARGE

## 2025-04-29 PROCEDURE — 3048F LDL-C <100 MG/DL: CPT | Performed by: NURSE PRACTITIONER

## 2025-04-29 PROCEDURE — 3044F HG A1C LEVEL LT 7.0%: CPT | Performed by: NURSE PRACTITIONER

## 2025-04-29 PROCEDURE — 1036F TOBACCO NON-USER: CPT | Performed by: INTERNAL MEDICINE

## 2025-04-29 PROCEDURE — 99214 OFFICE O/P EST MOD 30 MIN: CPT | Performed by: NURSE PRACTITIONER

## 2025-04-29 PROCEDURE — 3044F HG A1C LEVEL LT 7.0%: CPT | Performed by: INTERNAL MEDICINE

## 2025-04-29 PROCEDURE — 3048F LDL-C <100 MG/DL: CPT | Performed by: INTERNAL MEDICINE

## 2025-04-29 PROCEDURE — 3075F SYST BP GE 130 - 139MM HG: CPT | Performed by: INTERNAL MEDICINE

## 2025-04-29 PROCEDURE — 3008F BODY MASS INDEX DOCD: CPT | Performed by: INTERNAL MEDICINE

## 2025-04-29 PROCEDURE — 3078F DIAST BP <80 MM HG: CPT | Performed by: INTERNAL MEDICINE

## 2025-04-29 RX ORDER — SEMAGLUTIDE 1.34 MG/ML
1 INJECTION, SOLUTION SUBCUTANEOUS
Qty: 9 ML | Refills: 3 | Status: SHIPPED | OUTPATIENT
Start: 2025-04-29

## 2025-04-29 ASSESSMENT — ENCOUNTER SYMPTOMS
ACTIVITY CHANGE: 0
DIARRHEA: 0
OCCASIONAL FEELINGS OF UNSTEADINESS: 0
SHORTNESS OF BREATH: 0
NUMBNESS: 0
DEPRESSION: 0
SLEEP DISTURBANCE: 0
NAUSEA: 0
APPETITE CHANGE: 0
DIZZINESS: 0
FATIGUE: 0
WEAKNESS: 0
LOSS OF SENSATION IN FEET: 0
NERVOUS/ANXIOUS: 0
SEIZURES: 0
PALPITATIONS: 0
FREQUENCY: 0
CONSTIPATION: 0
POLYPHAGIA: 0
POLYDIPSIA: 0

## 2025-04-29 ASSESSMENT — ASTHMA QUESTIONNAIRES
ACT_TOTALSCORE: 19
QUESTION_5 LAST FOUR WEEKS HOW WOULD YOU RATE YOUR ASTHMA CONTROL: SOMEWHAT CONTROLLED
QUESTION_4 LAST FOUR WEEKS HOW OFTEN HAVE YOU USED YOUR RESCUE INHALER OR NEBULIZER MEDICATION (SUCH AS ALBUTEROL): 2 OR 3 TIMES PER WEEK
QUESTION_3 LAST FOUR WEEKS HOW OFTEN DID YOUR ASTHMA SYMPTOMS (WHEEZING, COUGHING, SHORTNESS OF BREATH, CHEST TIGHTNESS OR PAIN) WAKE YOU UP AT NIGHT OR EARLIER THAN USUAL IN THE MORNING: NOT AT ALL
QUESTION_2 LAST FOUR WEEKS HOW OFTEN HAVE YOU HAD SHORTNESS OF BREATH: 1 OR 2 TIMES PER WEEK
QUESTION_1 LAST FOUR WEEKS HOW MUCH OF THE TIME DID YOUR ASTHMA KEEP YOU FROM GETTING AS MUCH DONE AT WORK, SCHOOL OR AT HOME: A LITTLE OF THE TIME

## 2025-04-29 ASSESSMENT — PAIN SCALES - GENERAL: PAINLEVEL_OUTOF10: 0-NO PAIN

## 2025-04-29 NOTE — PATIENT INSTRUCTIONS
Type 2 diabetes mellitus, is at goal. A1C 6%  RX changes:   Increase Ozempic 1 mg weekly: BUT start with dialing the full dose then dial back 20 clicks which will be approximately 0.75 mg weekly. This will help with some weight balance.   Education:  interpretation of lab results, blood sugar goals, and complications of diabetes mellitus  BMI 30: Exercise: Move every day with goal of going to gym 5 days a week. Continue meal planning. Instructed to increase whole fruits/vegestables & reduce packaged/processed food   Hyperlipidemia: At goal. No changes  Hypertension: At goal. No changes.   Follow up: I recommend diabetes care be 5-6 months

## 2025-04-29 NOTE — PROGRESS NOTES
Bronchoscopy Scheduling Request    Pre-bronchoscopy visit: Not needed   Please schedule procedure: Next available    Cytology on-site:  Yes  Location:  Either location  Performing physician:  Advanced diagnostic bronchoscopist  Referring physician:  Luis Mcclendon MD, David Skelton MD  Indication:  LN, infiltrates, ?sarcoid  Sedation / Anesthesia:  GA  Procedure:  BAL, TBBx, Dx EBUS, Sarcoid protocol  Time:  Tier 2  Fluorscopy:   Yes  Imaging needed:  None  Labs:  CBC, BMP  Meds:  GLP-1 agonists (e.g. Ozempic, Trulicity, Wegovy, Mounjaro, etc.)  Special Considerations:  None  Reviewed by:  Steve Frost MD

## 2025-04-29 NOTE — PROGRESS NOTES
"Asthma severe persistent     Interval 4/29/2025    She is overall feeling well.  She ran out of her Advair few days ago.  She has the Trelegy 200 now and will start it soon. Act 19.  No emergency room visits or hospitalization or prednisone since I last saw her    MMRC 2  PE:  /75   Pulse 84   Ht 1.575 m (5' 2\")   Wt 74.8 kg (165 lb)   SpO2 96%   BMI 30.18 kg/m²   Lungs:CTA     Interval 3/19/2025  On 3/8 she had a urgent care visit for wheezing and fever.  Her rapid flu and COVID were negative.  Her chest x-ray was largely unchanged .  She was given a Medrol Dosepak and a course of antibiotic.  She feels improved.  Her ACT today remains 13 however.  She is using her albuterol more frequently.  She is not interested in starting any more Biologics and did not start the tezpire. She was unable to get the Spiriva because of cost.  03/16 ED visit mechanical fall and epistaxis.    Asthma maintenance meds  Advair 500   No tiotropium  Singulair   Did not start the tezpire and is not interested in more Biologics.    Pe:  /83   Pulse 77   Resp 16   Ht 1.575 m (5' 2\")   Wt 72.1 kg (159 lb)   SpO2 97%   BMI 29.08 kg/m²   Lungs bilateral expiratory wheezes    Interval 12/11/2024    She is doing a lot better.  She finished the 5 days of prednisone.  She continues to take all her other asthma regimen.  ACT 10   PE: /82   Pulse 76   Temp 36.5 °C (97.7 °F)   Resp 18   Ht 1.549 m (5' 1\")   Wt 72.1 kg (159 lb)   SpO2 97%   BMI 30.04 kg/m²   Lungs:CTA       Interval 12/3/2024  Stopped the Fasenra that was started in June 2024 after 4 weeks or so because of lack of benefit.  She did not have a problem the whole summer.  Around Labor Day she went to [place] to visit her son and it was cold there.  She went to emergency room and was diagnosed with an asthma exacerbation prompting prednisone 20 mg for 5 days.  At the end of November she had an episode of recurrent vomiting prompting an emergency room " "visit.  An abdomen CT showed enteritis.  She received supportive care and recovered.  At the same time it showed infiltrate in the right middle lobe that were present on her old chest CT but given her persistent cough she was diagnosed with pneumonia and given a course of doxycycline.  She continues to cough dark brown phlegm for the last few months.  She is currently using the Advair 500/50.  Unfortunately the Spiriva was not covered by her insurance. Also use the Singulair. No biologics     I reviewed the emergency visit notes and the testing performed.  She was seen in the emergency room on 1127 for abdominal pain.  A CT was completed.  It showed some of the infiltrates that were previously seen on her chest CTs in the right middle lobe.  She was suspected to have pneumonia and was given a course of antibiotic/doxycycline.  She continues to be wheezy and complaining of a productive cough.  We started her on prednisone 20 mg yesterday.  MMRC 3, ACT not completed   PE:  /81   Pulse 75   Resp 18   Ht 1.549 m (5' 1\")   Wt 72.1 kg (159 lb)   SpO2 97%   BMI 30.04 kg/m²   Lungs: Bilateral wheezes     Interval 06/20  Finished 2 weeks of prednisone.  It helped but not like it used to.  She was still using her nebulizer about 3 times per day and before bedtime.  No nocturnal awakening.  She feels the hot humidity are bothering her.      PE:  /82 (BP Location: Left arm, Patient Position: Sitting)   Pulse 74   Temp 36.8 °C (98.3 °F) (Temporal)   Wt 73.9 kg (163 lb)   SpO2 95% Comment: RA  BMI 30.80 kg/m²   Lungs:CTA     Interval 6/5/2024  ACT 14. Montelukast, Advair  500/50   Using her albuterol frequently   Dupixent since jan 2024 not much improvement   Last pred burst in Jan 2024. No ER  visit or hospitalization     PE:  Temp 36.3 °C (97.4 °F)   Resp 22   Wt 69.4 kg (153 lb)   LMP  (LMP Unknown)   SpO2 93% Comment: Room air  BMI 28.91 kg/m²   Lungs: CTA     Interval 1/09/2024  Patient states she " "has \"gone downhill\" since last visit. Had \"double pneumonia\" and \"double asthma attack.\" Feels as though lungs have just been damaged as a result. Was also working in a building with mold, had to switch jobs. Reports \"asthma attack\" just before [place] with shortness of breath and wheezing. Also having increasing shortness of breath with exertion, that causes light-headedness. Walked with pulse ox yesterday and dropped to 89% SaO2.     Is using albuterol rescue inhaler every 4 hours. Also using Advair and Montelukast. She did put herself on steroids last week with rescue pack and this helped with shortness of breath but then symptoms worsened as soon as she stopped steroids.     Diag testing   PFT last 2021 ratio normal.  FEV1 1.77.  FVC 2.05, DLCO 16 pos BDR in the small airways no obstruction.     PFT 06 2024 ratio normal.  FEV1 1.91, FVC 2.2, DLCO 15.  No response to bronchodilator.  Feno 9    PFT 04/2025 ratio normal FEV1 post BD 1.75 (85%)  FVC 2.02 (79%) DLCO 14.4 (68%)      IgE 500 2022, 347 2023, neg aspergillus iGe and Igg  Eosinos 300   Eosinophils Absolute (x10E9/L)   Date Value   12/09/2022 0.30   03/11/2022 0.20   12/29/2020 0.17     IgE (IU/mL)   Date Value   03/16/2023 347 (H)     Aspergillus ige negative     Imaging   === 05/30/23 ===    CT CHEST WO IV CONTRAST    - Impression -  1. No significant interval change in the lungs compared to CT  03/13/2023, as described above. Findings may represent sequela of  prior infectious/inflammatory process such as reported recent COVID  pneumonia requiring hospitalization. Small airways disease and  atypical infectious processes remain differential considerations. A  component of organizing pneumonia is again not excluded.  2. Stable borderline and mildly enlarged mediastinal and upper  abdominal lymph nodes, likely reactive.  3. Small sliding hiatal hernia.  4. Stable 3 mm nodule in the right upper lobe.    I personally reviewed the images/study and I agree " with the findings as stated by resident physician Dr. Daniel Rivas.    Chest CT 06 2024   1.Redemonstrated bilateral bandlike opacities and reticulations with associated subtle ground-glass opacities bilaterally. Interval slight worsening patchy ground-glass opacities within the right upper lobe.  Findings again may represent sequelae of COVID with possible organizing pneumonia. Superimposed atypical infectious/inflammatory process not excluded.  2. Similar appearance of mildly enlarged mediastinal lymph nodes,likely reactive in nature.  3. Additional stable chronic findings as described above.    Sinus CT  CT 06/2024  wnl    === 11/27/24 ===    CT ABDOMEN PELVIS W IV CONTRAST    - Impression -  Basilar parenchymal opacities and parenchymal bands.  Please correlate  for component of pneumonia.  Enteritis.  Sigmoid colon diverticulosis without evidence of diverticulitis.  Small hiatal hernia.  Small hypodensity within the uterus suggests uterine fibroid.    === 04/15/25 ===    CT CHEST HIGH RESOLUTION    - Impression -  1. There has been interval increase in upper lobe predominantly  peribronchial ground-glass opacities with progression in mediastinal  lymphadenopathy.  2. Inflammatory/infiltrative process such as sarcoidosis or /BOOP  can be considered. Unusual etiologies such as bronchocentric  granulomatosis or a pulmonary histiocytosis can be considered.  3. Consider mediastinal aida or parenchymal tissue sampling.  4. Expiratory imaging suggest diffuse air trapping. Correlate with  PFTs.  5. Elevation of the right hemidiaphragm may represent eventration but  correlate with any concern for diaphragmatic dysfunction.          Impression and Plan    54-year old AA female (ex smoker, quit 22 years, <5 pack years), h/o asthma (since age 25), htn, hypercholesteremia, here for evaluation of asthma symptoms that have been uncontrolled of late.  Patient experience shortness of breath and cough constantly. She had  covid about 2 years ago had to be hospitalized for 1 day.  Has been on steroids on and off. She has partial relief from the steroids     1) severe persistent asthma - Ige levels >500 -->323, eosi 150-300, FeNO O  9 and 5 but on prednisone. ANCA negative, Aspergillus IgE and igG negative   Continue albuterol nebs twice daily and up to 4 times as needed.  We will switch to Trelegy from Advair to add a LAMA  Continue montelukast  Dupilumab Jan-June 2024 no relief, Fasenra June 2024-July no relief.  Not interested in more Biologics  Patient to follow up with her allergist           2) Bilateral interstitial infiltrates and LN- CT scan of the chest 05 2023 showed bilateral mild bronchial wall thickening and mucus plugging, minimal air trapping 3 mm RUL lung nodule. Patchy lobular bandlike areas of opacity in bilateral lungs.Repeat CT 06/2024 shows persistent patchy infiltrates, likely residual from prior COVID.? Slighly worse in 06 2024. (Aspergillus IgE and IgG negative), and worse again on most recent Ct from April 2025, DD is sarcoidosis vs HP vs GLILD?-- we will proceed with bronch, Immunoglobulin and IgG subclasses will be sent         3) Allergic rhinitis and post nasal drip  - Continue Flonase 1 sq in each nostril twice daily  - Continue to follow up with allergy.

## 2025-04-29 NOTE — PROGRESS NOTES
Subjective   Edmond Cool is a 54 y.o. female who presents for follow up visit for evaluation of Type 2 diabetes mellitus. The initial diagnosis of diabetes was made  April 2023 .     Her bother passed in May 2024 at age 53 of a heart attack.     Known complications due to diabetes included none    Cardiovascular risk factors include diabetes mellitus. The patient is not on an ACE inhibitor or angiotensin II receptor blocker.      Current diabetes regimen is as follows:   Ozempic 0.5 mg weekly    The patient is currently checking the blood glucose 1 times per day.  Patient is using: glucometer  Sytates she is running less that 120 daily. Using [employer] yolis    Hypoglycemia frequency: 0%  Hypoglycemia awareness: Yes     Exercise: daily   Meal panning: She is using avoidance of concentrated sweets.    Review of Systems   Constitutional:  Negative for activity change, appetite change and fatigue.   Respiratory:  Negative for shortness of breath.    Cardiovascular:  Negative for chest pain, palpitations and leg swelling.   Gastrointestinal:  Negative for constipation, diarrhea and nausea.   Endocrine: Negative for cold intolerance, heat intolerance, polydipsia, polyphagia and polyuria.   Genitourinary:  Negative for frequency.   Musculoskeletal:  Negative for gait problem.   Skin:  Negative for rash.   Neurological:  Negative for dizziness, seizures, weakness and numbness.   Psychiatric/Behavioral:  Negative for sleep disturbance and suicidal ideas. The patient is not nervous/anxious.        Objective   There were no vitals taken for this visit.  Physical Exam  Pulmonary:      Effort: Pulmonary effort is normal.   Skin:     General: Skin is warm and dry.   Neurological:      Mental Status: She is alert.   Psychiatric:         Mood and Affect: Mood normal.         Behavior: Behavior normal.         Thought Content: Thought content normal.         Judgment: Judgment normal.         Lab Review  Glucose (mg/dL)   Date  Value   02/08/2025 85   02/07/2025 96   11/27/2024 85     POC HEMOGLOBIN A1c (%)   Date Value   10/25/2024 5.8   03/29/2024 5.7   11/21/2023 6.2     Hemoglobin A1C (%)   Date Value   02/07/2025 6.0 (H)     Bicarbonate (mmol/L)   Date Value   02/08/2025 26   02/07/2025 28   11/27/2024 28     Urea Nitrogen (mg/dL)   Date Value   02/08/2025 11   02/07/2025 12   11/27/2024 12     Creatinine (mg/dL)   Date Value   02/08/2025 0.53   02/07/2025 0.68   11/27/2024 0.59       Health Maintenance:   Foot Exam: None. Denies issues at today's visit.   [facility]  Lipid Panel: Total 161 LDL 73 October 2024  Urine Albumin: Negative for protein on urinalysis Nov 2024.     Assessment/Plan   Diagnoses and all orders for this visit:  Type 2 diabetes mellitus without complication, unspecified whether long term insulin use  -     Hemoglobin A1c; Future  -     Albumin-Creatinine Ratio, Urine Random; Future  Uncontrolled type 2 diabetes mellitus with hyperglycemia, without long-term current use of insulin  Hyperlipidemia due to type 2 diabetes mellitus (Multi)  Hypertension, unspecified type  Class 1 obesity due to excess calories with serious comorbidity and body mass index (BMI) of 30.0 to 30.9 in adult    Type 2 diabetes mellitus, is at goal. A1C 6%  RX changes:   Increase Ozempic 1 mg weekly: BUT start with dialing the full dose then dial back 20 clicks which will be approximately 0.75 mg weekly. This will help with some weight balance.   Education:  interpretation of lab results, blood sugar goals, and complications of diabetes mellitus  BMI 30: Exercise: Move every day with goal of going to gym 5 days a week. Continue meal planning. Instructed to increase whole fruits/vegestables & reduce packaged/processed food   Hyperlipidemia: At goal. No changes  Hypertension: At goal. No changes.   Follow up: I recommend diabetes care be 5-6 months

## 2025-04-29 NOTE — PROGRESS NOTES
Bronchoscopy Scheduling Request    Pre-bronchoscopy visit: Not needed - saw Luis Hakan on 04/29/25  Please schedule procedure: Next available    Cytology on-site:  Yes  Location:  Either location, [facility] preferred   Performing physician:  Advanced diagnostic bronchoscopist  Referring physician:  Luis Mcclendon MD, David Skelton MD  Indication:  B/L lung infiltrate, concerns for sarcoidosis; has severe persistent asthma  Sedation / Anesthesia:  GA  Procedure:  BAL, TBBx, Dx EBUS, Sarcoid protocol  Time:  Tier 2  Fluorscopy:   Yes  Imaging needed:  None  Labs:  CBC, BMP  Meds:  GLP-1 agonists (e.g. Ozempic, Trulicity, Wegovy, Mounjaro, etc.)  Special Considerations:   Tbbx required (from referring physician) - concerns for HP versus GLILD as well  Reviewed by:  Rudolph Martin MD on 04/29/25

## 2025-05-02 DIAGNOSIS — E09.65 DRUG OR CHEMICAL INDUCED DIABETES MELLITUS WITH HYPERGLYCEMIA, UNSPECIFIED WHETHER LONG TERM INSULIN USE: ICD-10-CM

## 2025-05-02 PROCEDURE — RXMED WILLOW AMBULATORY MEDICATION CHARGE

## 2025-05-02 RX ORDER — ISOPROPYL ALCOHOL 70 ML/100ML
SWAB TOPICAL
Qty: 300 EACH | Refills: 3 | Status: CANCELLED | OUTPATIENT
Start: 2025-05-02

## 2025-05-03 LAB
ANION GAP SERPL CALCULATED.4IONS-SCNC: 10 MMOL/L (CALC) (ref 7–17)
BUN SERPL-MCNC: 14 MG/DL (ref 7–25)
BUN/CREAT SERPL: ABNORMAL (CALC) (ref 6–22)
CALCIUM SERPL-MCNC: 9.9 MG/DL (ref 8.6–10.4)
CHLORIDE SERPL-SCNC: 104 MMOL/L (ref 98–110)
CO2 SERPL-SCNC: 26 MMOL/L (ref 20–32)
CREAT SERPL-MCNC: 0.61 MG/DL (ref 0.5–1.03)
EGFRCR SERPLBLD CKD-EPI 2021: 106 ML/MIN/1.73M2
ERYTHROCYTE [DISTWIDTH] IN BLOOD BY AUTOMATED COUNT: 13.5 % (ref 11–15)
GLUCOSE SERPL-MCNC: 104 MG/DL (ref 65–99)
HCT VFR BLD AUTO: 42.9 % (ref 35–45)
HGB BLD-MCNC: 14.7 G/DL (ref 11.7–15.5)
MCH RBC QN AUTO: 30.3 PG (ref 27–33)
MCHC RBC AUTO-ENTMCNC: 34.3 G/DL (ref 32–36)
MCV RBC AUTO: 88.5 FL (ref 80–100)
PLATELET # BLD AUTO: 251 THOUSAND/UL (ref 140–400)
PMV BLD REES-ECKER: 12.4 FL (ref 7.5–12.5)
POTASSIUM SERPL-SCNC: 4.1 MMOL/L (ref 3.5–5.3)
RBC # BLD AUTO: 4.85 MILLION/UL (ref 3.8–5.1)
SODIUM SERPL-SCNC: 140 MMOL/L (ref 135–146)
WBC # BLD AUTO: 4.9 THOUSAND/UL (ref 3.8–10.8)

## 2025-05-05 ENCOUNTER — APPOINTMENT (OUTPATIENT)
Dept: BEHAVIORAL HEALTH | Facility: CLINIC | Age: 55
End: 2025-05-05
Payer: COMMERCIAL

## 2025-05-05 ENCOUNTER — PHARMACY VISIT (OUTPATIENT)
Dept: PHARMACY | Facility: CLINIC | Age: 55
End: 2025-05-05
Payer: COMMERCIAL

## 2025-05-06 ENCOUNTER — ANESTHESIA EVENT (OUTPATIENT)
Dept: GASTROENTEROLOGY | Facility: HOSPITAL | Age: 55
End: 2025-05-06
Payer: COMMERCIAL

## 2025-05-06 NOTE — ANESTHESIA PREPROCEDURE EVALUATION
"Patient: Edmond Cool    Procedure Information       Date/Time: 05/07/25 1130    Scheduled providers: Ankur Byrne MD; Alexy Campoverde MD    Procedure: BRONCHOSCOPY    [place] [facility] [place]            Relevant Problems   Cardiac   (+) Abdominal aortic aneurysm   (+) Benign essential hypertension   (+) Essential familial hyperlipidemia   (+) Hyperlipidemia      Pulmonary   (+) Asthmatic bronchitis with exacerbation (HHS-HCC)   (+) Persistent asthma with acute exacerbation (HHS-HCC)   (+) Severe persistent asthma      Neuro   (+) Anxiety   (+) Median nerve neuritis   (+) Sciatica of right side      GI   (+) GERD without esophagitis      Endocrine   (+) Type 2 diabetes mellitus without complications      Musculoskeletal   (+) Other intervertebral disc displacement, lumbar region       Clinical information reviewed:   Tobacco  Allergies  Meds   Med Hx  Surg Hx  OB Status  Fam Hx  Soc   Hx         Medical History[1]   Surgical History[2]  Social History[3]   Current Outpatient Medications   Medication Instructions    albuterol 90 mcg/actuation inhaler INHALE 2 PUFFS BY MOUTH EVERY 4 HOURS AS NEEDED FOR WHEEZING    alcohol swabs (Alcohol Pads) pads, medicated Use as directed for diabetes care    amLODIPine (NORVASC) 5 mg, oral, Daily    azelastine (Astelin) 137 mcg (0.1 %) nasal spray 1 spray, Each Nostril, 2 times daily, Use in each nostril as directed    BD Ultra-Fine April Pen Needle 32 gauge x 5/32\" needle     blood sugar diagnostic (Accu-Chek Guide test strips) strip Use as directed to test sugars up to 3 times daily    blood-glucose meter (Accu-Chek Guide Glucose Meter) misc Use to check glucose as directed    fluticasone (Flonase) 50 mcg/actuation nasal spray USE 1 SPRAY IN EACH NOSTRIL EVERY 12 HOURS    fluticasone propion-salmeteroL (Advair Diskus) 500-50 mcg/dose diskus inhaler 1 puff, inhalation, 2 times daily RT, Rinse mouth with water after use to reduce aftertaste and incidence of " "candidiasis. Do not swallow.    fluticasone-umeclidin-vilanter (Trelegy Ellipta) 200-62.5-25 mcg blister with device 1 puff, inhalation, Daily, Rinse mouth with water after use to reduce aftertaste and incidence of candidiasis. Do not swallow.    ibuprofen 600 mg, oral, Every 6 hours PRN    lancets (Accu-Chek Softclix Lancets) misc 1 each, miscellaneous, Daily    magnesium 30 mg, 2 times daily    meloxicam (MOBIC) 15 mg, oral, Daily    montelukast (SINGULAIR) 10 mg, oral, Nightly    Ozempic 1 mg, subcutaneous, Every 7 days    Ozempic 0.5 mg, subcutaneous, Every 7 days    Ozempic 1 mg, subcutaneous, Every 7 days    pantoprazole (ProtoNix) 40 mg EC tablet TAKE 1 TABLET BY MOUTH ONCE DAILY    rosuvastatin (Crestor) 20 mg tablet TAKE 1 TABLET BY MOUTH ONCE DAILY    vitamin E 45 mg (100 unit) capsule Daily      RX Allergies[4]     Chemistry    Lab Results   Component Value Date/Time     05/02/2025 1316    K 4.1 05/02/2025 1316     05/02/2025 1316    CO2 26 05/02/2025 1316    BUN 14 05/02/2025 1316    CREATININE 0.61 05/02/2025 1316    Lab Results   Component Value Date/Time    CALCIUM 9.9 05/02/2025 1316    ALKPHOS 150 (H) 02/07/2025 1431    AST 58 (H) 02/07/2025 1431    ALT 69 (H) 02/07/2025 1431    BILITOT 0.3 02/07/2025 1431          Lab Results   Component Value Date    HGBA1C 6.0 (H) 02/07/2025     Lab Results   Component Value Date/Time    WBC 4.9 05/02/2025 1316    HGB 14.7 05/02/2025 1316    HCT 42.9 05/02/2025 1316     05/02/2025 1316     Lab Results   Component Value Date/Time    PROTIME 11.7 03/12/2023 2010    INR 1.0 03/12/2023 2010     No results found for: \"ABORH\"  Encounter Date: 02/07/25   ECG 12 lead   Result Value    Ventricular Rate 72    Atrial Rate 72    WV Interval 130    QRS Duration 66    QT Interval 480    QTC Calculation(Bazett) 525    P Axis 65    R Axis -3    T Axis 30    QRS Count 12    Q Onset 219    P Onset 154    P Offset 200    T Offset 459    QTC Fredericia 510    " Narrative    Normal sinus rhythm  Possible Anterolateral infarct (cited on or before 28-MAY-2024)  Prolonged QT  Abnormal ECG  When compared with ECG of 28-MAY-2024 00:31,  Premature atrial complexes are no longer Present  Questionable change in initial forces of Lateral leads  See ED provider note for full interpretation and clinical correlation  Confirmed by Romy Amaro ([id number]) on 2/26/2025 8:10:50 PM     No results found for this or any previous visit from the past 1095 days.    Echo 2/8/2025:  Left Ventricle: Left ventricular ejection fraction is normal, by visual estimate at 55-60%. There are no regional left ventricular wall motion abnormalities. The left ventricular cavity size is normal. There is normal septal and mildly increased posterior left ventricular wall thickness. There is left ventricular concentric remodeling. Spectral Doppler shows a Grade I (impaired relaxation pattern) of left ventricular diastolic filling with normal left atrial filling pressure.  Left Atrium: The left atrial size is normal. A bubble study using agitated saline was performed. Bubble study is negative.  Right Ventricle: The right ventricle is normal in size. There is normal right ventricular global systolic function.  Right Atrium: The right atrium is normal in size.  Aortic Valve: The aortic valve is trileaflet. The aortic valve dimensionless index is 0.83. There is no evidence of aortic valve regurgitation. The peak instantaneous gradient of the aortic valve is 7 mmHg. The mean gradient of the aortic valve is 4 mmHg.  Mitral Valve: The mitral valve is normal in structure. There is no evidence of mitral valve regurgitation.  Tricuspid Valve: The tricuspid valve is structurally normal. No evidence of tricuspid regurgitation.  Pulmonic Valve: The pulmonic valve is structurally normal. There is physiologic pulmonic valve regurgitation.  Pericardium: There is no pericardial effusion noted.  Aorta: The aortic root is  "normal.  In comparison to the previous echocardiogram(s): There are no prior studies on this patient for comparison purposes.     CONCLUSIONS:   1. Left ventricular ejection fraction is normal, by visual estimate at 55-60%.   2. Spectral Doppler shows a Grade I (impaired relaxation pattern) of left ventricular diastolic filling with normal left atrial filling pressure.   3. There is normal right ventricular global systolic function.     Visit Vitals  /81   Pulse 74   Temp 36.1 °C (97 °F) (Temporal)   Resp 16   Ht 1.575 m (5' 2\")   Wt 75 kg (165 lb 5.5 oz)   SpO2 96%   BMI 30.24 kg/m²   OB Status Perimenopausal   Smoking Status Former   BSA 1.81 m²     NPO/Void Status  Carbohydrate Drink Given Prior to Surgery? : N  Date of Last Liquid: 25  Time of Last Liquid: 710  Date of Last Solid: 25  Time of Last Solid:   Last Intake Type: Clear fluids  Time of Last Void: 08        Physical Exam    Airway  Mallampati: III  TM distance: >3 FB  Neck ROM: full  Mouth opening: 3 or more finger widths     Cardiovascular - normal exam  Rhythm: regular  Rate: normal     Dental    Pulmonary - normal exam   Abdominal - normal exam           Anesthesia Plan    History of general anesthesia?: yes  History of complications of general anesthesia?: no    ASA 2     general   (GETA with standard ASA monitoring)  intravenous induction   Postoperative pain plan includes opioids.  Anesthetic plan and risks discussed with patient.    Plan discussed with CAA and CRNA.             [1]   Past Medical History:  Diagnosis Date    Allergy to seafood 12/15/2022    History of allergy to shellfish    Asthma     Diabetes mellitus (Multi) 3-12-23    Essential (primary) hypertension     GERD (gastroesophageal reflux disease)     Hyperlipidemia    [2]   Past Surgical History:  Procedure Laterality Date    BREAST BIOPSY       SECTION, LOW TRANSVERSE      ESOPHAGOGASTRODUODENOSCOPY      FRACTURE SURGERY      ROTATOR " CUFF REPAIR Right 2021    WISDOM TOOTH EXTRACTION     [3]   Social History  Tobacco Use    Smoking status: Former     Current packs/day: 0.00     Types: Cigarettes     Quit date:      Years since quittin.3    Smokeless tobacco: Never   Vaping Use    Vaping status: Never Used   Substance Use Topics    Alcohol use: Yes     Comment: social    Drug use: Never   [4]   Allergies  Allergen Reactions    Bee Venom Protein (Honey Bee) Unknown and Shortness of breath     Severe allergy reaction to wasp    Dog Dander Unknown    Iodine Other    Keflex [Cephalexin] Other

## 2025-05-07 ENCOUNTER — ANESTHESIA (OUTPATIENT)
Dept: GASTROENTEROLOGY | Facility: HOSPITAL | Age: 55
End: 2025-05-07
Payer: COMMERCIAL

## 2025-05-07 ENCOUNTER — HOSPITAL ENCOUNTER (OUTPATIENT)
Dept: RADIOLOGY | Facility: HOSPITAL | Age: 55
Discharge: HOME | End: 2025-05-07
Payer: COMMERCIAL

## 2025-05-07 ENCOUNTER — HOSPITAL ENCOUNTER (OUTPATIENT)
Dept: GASTROENTEROLOGY | Facility: HOSPITAL | Age: 55
Discharge: HOME | End: 2025-05-07
Payer: COMMERCIAL

## 2025-05-07 VITALS
WEIGHT: 165.34 LBS | BODY MASS INDEX: 30.43 KG/M2 | OXYGEN SATURATION: 96 % | HEART RATE: 81 BPM | RESPIRATION RATE: 22 BRPM | SYSTOLIC BLOOD PRESSURE: 128 MMHG | HEIGHT: 62 IN | DIASTOLIC BLOOD PRESSURE: 79 MMHG | TEMPERATURE: 97.5 F

## 2025-05-07 DIAGNOSIS — R91.8 LUNG INFILTRATE ON CT: ICD-10-CM

## 2025-05-07 DIAGNOSIS — R91.8 OTHER NONSPECIFIC ABNORMAL FINDING OF LUNG FIELD: ICD-10-CM

## 2025-05-07 LAB
BASOPHILS NFR FLD MANUAL: 0 %
BLASTS NFR FLD MANUAL: 0 % (ref ?–0)
CLARITY FLD: ABNORMAL
COLOR FLD: COLORLESS
EOSINOPHIL NFR FLD MANUAL: 0 %
GLUCOSE BLD MANUAL STRIP-MCNC: 99 MG/DL (ref 74–99)
IMMATURE GRANULOCYTES IN FLUID: 0 %
LYMPHOCYTES NFR FLD MANUAL: 28 %
MONOS+MACROS NFR FLD MANUAL: 72 %
NEUTROPHILS NFR FLD MANUAL: 0 %
OTHER CELLS NFR FLD MANUAL: 0 % (ref ?–0)
PLASMA CELLS NFR FLD MANUAL: 0 % (ref ?–0)
RBC # FLD AUTO: 1000 /UL
TOTAL CELLS COUNTED FLD: 100
WBC # FLD AUTO: 82 /UL

## 2025-05-07 PROCEDURE — 7100000010 HC PHASE TWO TIME - EACH INCREMENTAL 1 MINUTE

## 2025-05-07 PROCEDURE — 7100000001 HC RECOVERY ROOM TIME - INITIAL BASE CHARGE

## 2025-05-07 PROCEDURE — 71045 X-RAY EXAM CHEST 1 VIEW: CPT

## 2025-05-07 PROCEDURE — 31628 BRONCHOSCOPY/LUNG BX EACH: CPT | Performed by: INTERNAL MEDICINE

## 2025-05-07 PROCEDURE — A31628 PR BRONCHOSCOPY,TRANSBRONCH BIOPSY: Performed by: NURSE ANESTHETIST, CERTIFIED REGISTERED

## 2025-05-07 PROCEDURE — A31628 PR BRONCHOSCOPY,TRANSBRONCH BIOPSY: Performed by: ANESTHESIOLOGY

## 2025-05-07 PROCEDURE — 3700000002 HC GENERAL ANESTHESIA TIME - EACH INCREMENTAL 1 MINUTE

## 2025-05-07 PROCEDURE — 88184 FLOWCYTOMETRY/ TC 1 MARKER: CPT | Mod: AHULAB | Performed by: INTERNAL MEDICINE

## 2025-05-07 PROCEDURE — 2500000005 HC RX 250 GENERAL PHARMACY W/O HCPCS: Performed by: NURSE ANESTHETIST, CERTIFIED REGISTERED

## 2025-05-07 PROCEDURE — 76000 FLUOROSCOPY <1 HR PHYS/QHP: CPT

## 2025-05-07 PROCEDURE — 2500000005 HC RX 250 GENERAL PHARMACY W/O HCPCS: Performed by: ANESTHESIOLOGY

## 2025-05-07 PROCEDURE — 2500000002 HC RX 250 W HCPCS SELF ADMINISTERED DRUGS (ALT 637 FOR MEDICARE OP, ALT 636 FOR OP/ED): Performed by: INTERNAL MEDICINE

## 2025-05-07 PROCEDURE — 82947 ASSAY GLUCOSE BLOOD QUANT: CPT

## 2025-05-07 PROCEDURE — 31652 BRONCH EBUS SAMPLNG 1/2 NODE: CPT | Performed by: INTERNAL MEDICINE

## 2025-05-07 PROCEDURE — 87116 MYCOBACTERIA CULTURE: CPT | Mod: AHULAB | Performed by: INTERNAL MEDICINE

## 2025-05-07 PROCEDURE — 2500000004 HC RX 250 GENERAL PHARMACY W/ HCPCS (ALT 636 FOR OP/ED): Mod: JZ | Performed by: NURSE ANESTHETIST, CERTIFIED REGISTERED

## 2025-05-07 PROCEDURE — 89051 BODY FLUID CELL COUNT: CPT | Mod: AHULAB | Performed by: INTERNAL MEDICINE

## 2025-05-07 PROCEDURE — 31624 DX BRONCHOSCOPE/LAVAGE: CPT | Performed by: INTERNAL MEDICINE

## 2025-05-07 PROCEDURE — 87070 CULTURE OTHR SPECIMN AEROBIC: CPT | Mod: AHULAB | Performed by: INTERNAL MEDICINE

## 2025-05-07 PROCEDURE — 3700000001 HC GENERAL ANESTHESIA TIME - INITIAL BASE CHARGE

## 2025-05-07 PROCEDURE — 7100000009 HC PHASE TWO TIME - INITIAL BASE CHARGE

## 2025-05-07 PROCEDURE — 7100000002 HC RECOVERY ROOM TIME - EACH INCREMENTAL 1 MINUTE

## 2025-05-07 PROCEDURE — 87102 FUNGUS ISOLATION CULTURE: CPT | Mod: AHULAB | Performed by: INTERNAL MEDICINE

## 2025-05-07 PROCEDURE — 2720000007 HC OR 272 NO HCPCS

## 2025-05-07 RX ORDER — OXYCODONE HYDROCHLORIDE 5 MG/1
5 TABLET ORAL EVERY 4 HOURS PRN
Status: DISCONTINUED | OUTPATIENT
Start: 2025-05-07 | End: 2025-05-08 | Stop reason: HOSPADM

## 2025-05-07 RX ORDER — DROPERIDOL 2.5 MG/ML
0.62 INJECTION, SOLUTION INTRAMUSCULAR; INTRAVENOUS ONCE AS NEEDED
Status: DISCONTINUED | OUTPATIENT
Start: 2025-05-07 | End: 2025-05-08 | Stop reason: HOSPADM

## 2025-05-07 RX ORDER — MIDAZOLAM HYDROCHLORIDE 1 MG/ML
INJECTION INTRAMUSCULAR; INTRAVENOUS AS NEEDED
Status: DISCONTINUED | OUTPATIENT
Start: 2025-05-07 | End: 2025-05-07

## 2025-05-07 RX ORDER — ALBUTEROL SULFATE 0.83 MG/ML
2.5 SOLUTION RESPIRATORY (INHALATION) ONCE
Status: COMPLETED | OUTPATIENT
Start: 2025-05-07 | End: 2025-05-07

## 2025-05-07 RX ORDER — FENTANYL CITRATE 50 UG/ML
INJECTION, SOLUTION INTRAMUSCULAR; INTRAVENOUS AS NEEDED
Status: DISCONTINUED | OUTPATIENT
Start: 2025-05-07 | End: 2025-05-07

## 2025-05-07 RX ORDER — ALBUTEROL SULFATE 0.83 MG/ML
2.5 SOLUTION RESPIRATORY (INHALATION) ONCE AS NEEDED
Status: DISCONTINUED | OUTPATIENT
Start: 2025-05-07 | End: 2025-05-08 | Stop reason: HOSPADM

## 2025-05-07 RX ORDER — PROPOFOL 10 MG/ML
INJECTION, EMULSION INTRAVENOUS AS NEEDED
Status: DISCONTINUED | OUTPATIENT
Start: 2025-05-07 | End: 2025-05-07

## 2025-05-07 RX ORDER — ACETAMINOPHEN 325 MG/1
650 TABLET ORAL EVERY 4 HOURS PRN
Status: DISCONTINUED | OUTPATIENT
Start: 2025-05-07 | End: 2025-05-08 | Stop reason: HOSPADM

## 2025-05-07 RX ORDER — DIPHENHYDRAMINE HYDROCHLORIDE 50 MG/ML
25 INJECTION, SOLUTION INTRAMUSCULAR; INTRAVENOUS ONCE AS NEEDED
Status: DISCONTINUED | OUTPATIENT
Start: 2025-05-07 | End: 2025-05-08 | Stop reason: HOSPADM

## 2025-05-07 RX ORDER — ONDANSETRON HYDROCHLORIDE 2 MG/ML
INJECTION, SOLUTION INTRAVENOUS AS NEEDED
Status: DISCONTINUED | OUTPATIENT
Start: 2025-05-07 | End: 2025-05-07

## 2025-05-07 RX ORDER — LIDOCAINE HYDROCHLORIDE 40 MG/ML
SOLUTION TOPICAL AS NEEDED
Status: DISCONTINUED | OUTPATIENT
Start: 2025-05-07 | End: 2025-05-07

## 2025-05-07 RX ORDER — ROCURONIUM BROMIDE 10 MG/ML
INJECTION, SOLUTION INTRAVENOUS AS NEEDED
Status: DISCONTINUED | OUTPATIENT
Start: 2025-05-07 | End: 2025-05-07

## 2025-05-07 RX ORDER — ONDANSETRON HYDROCHLORIDE 2 MG/ML
4 INJECTION, SOLUTION INTRAVENOUS ONCE AS NEEDED
Status: DISCONTINUED | OUTPATIENT
Start: 2025-05-07 | End: 2025-05-08 | Stop reason: HOSPADM

## 2025-05-07 RX ADMIN — ONDANSETRON 4 MG: 2 INJECTION, SOLUTION INTRAMUSCULAR; INTRAVENOUS at 12:37

## 2025-05-07 RX ADMIN — LIDOCAINE HYDROCHLORIDE 4 ML: 40 SOLUTION TOPICAL at 12:26

## 2025-05-07 RX ADMIN — PROPOFOL 120 MCG/KG/MIN: 10 INJECTION, EMULSION INTRAVENOUS at 12:25

## 2025-05-07 RX ADMIN — MIDAZOLAM HYDROCHLORIDE 2 MG: 1 INJECTION, SOLUTION INTRAMUSCULAR; INTRAVENOUS at 12:14

## 2025-05-07 RX ADMIN — FENTANYL CITRATE 25 MCG: 50 INJECTION, SOLUTION INTRAMUSCULAR; INTRAVENOUS at 12:35

## 2025-05-07 RX ADMIN — SODIUM CHLORIDE, SODIUM LACTATE, POTASSIUM CHLORIDE, AND CALCIUM CHLORIDE: .6; .31; .03; .02 INJECTION, SOLUTION INTRAVENOUS at 12:17

## 2025-05-07 RX ADMIN — ALBUTEROL SULFATE 2.5 MG: 2.5 SOLUTION RESPIRATORY (INHALATION) at 13:31

## 2025-05-07 RX ADMIN — SUGAMMADEX 200 MG: 100 INJECTION, SOLUTION INTRAVENOUS at 13:19

## 2025-05-07 RX ADMIN — DEXAMETHASONE SODIUM PHOSPHATE 8 MG: 4 INJECTION, SOLUTION INTRAMUSCULAR; INTRAVENOUS at 12:37

## 2025-05-07 RX ADMIN — Medication 8 L/MIN: at 13:31

## 2025-05-07 RX ADMIN — ROCURONIUM BROMIDE 50 MG: 10 INJECTION, SOLUTION INTRAVENOUS at 12:24

## 2025-05-07 RX ADMIN — ROCURONIUM BROMIDE 10 MG: 10 INJECTION, SOLUTION INTRAVENOUS at 13:07

## 2025-05-07 RX ADMIN — PROPOFOL 150 MG: 10 INJECTION, EMULSION INTRAVENOUS at 12:23

## 2025-05-07 ASSESSMENT — PAIN SCALES - GENERAL
PAINLEVEL_OUTOF10: 0 - NO PAIN

## 2025-05-07 ASSESSMENT — PAIN - FUNCTIONAL ASSESSMENT
PAIN_FUNCTIONAL_ASSESSMENT: 0-10

## 2025-05-07 ASSESSMENT — COLUMBIA-SUICIDE SEVERITY RATING SCALE - C-SSRS
6. HAVE YOU EVER DONE ANYTHING, STARTED TO DO ANYTHING, OR PREPARED TO DO ANYTHING TO END YOUR LIFE?: NO
1. IN THE PAST MONTH, HAVE YOU WISHED YOU WERE DEAD OR WISHED YOU COULD GO TO SLEEP AND NOT WAKE UP?: NO
2. HAVE YOU ACTUALLY HAD ANY THOUGHTS OF KILLING YOURSELF?: NO

## 2025-05-07 NOTE — LETTER
DearEdmond      5/1/2025                                                                 INFORMATION FOR YOUR PROCEDURE     INSTRUCTIONS MUST BE FOLLOWED OR YOU RUN THE RISK OF YOUR CASE BEING CANCELED     Information is attached to this e-mail for your upcoming procedure. (Look for the paperclip in your email to access this information)  Lab requisitions are also included as well as procedural instructions.    You are scheduled for your Bronchoscopy on  5/7/25,    with Dr. Ankur Byrne    Arrival is at   10:30 AM,  for Check In prior to your actual procedure time. This allows us to prepare you for your procedure.  Location:   [place] [address]    Check In:  2nd Floor OR Waiting Room  Approach the  for Check In    Patient information is right there if assistance is needed.    NPO (No food or drink) after midnight the night before your procedure. This includes coffee, water, and soda, hard candy, gum or mints.  If taking your morning medications, a small sip of water is allowed to get the medication down.    For your safety, you must have a responsible, adult  accompany you to your procedure.  You will not be permitted to drive yourself home if you have received any type of anesthesia or sedation.    Automated calls about your upcoming scheduled appointments can be quite confusing for patients.    The times may vary depending on what you have scheduled for procedure day. Follow the time/s I have given you  so there is no confusion.    Blood work will need to be done prior to your procedure, preferably at a  Facility.  There are no restrictions for testing. I have attached a requisition for you.    Hold Ozempic per protocol          Please reach out to me with any questions you may have Susan @ [phone] or   Parviz @ [phone]    In case of an emergency  on the day of your procedure,  and you need to cancel, (Weather  or other), please call the [facility] @ [phone].    Have a nice day!    Susan Ruiz    Bronchoscopy   Interventional Pulmonology    MD Ankur Santiago MD Sameer Avasarala, MD Catalina Teba, MD Andrew Dunatchik, MD Sruti Brahmandam, MD        [facility]  Pulmonary, Critical Care and Sleep Medicine  [address] [place] [address] [phone]   [phone]  [email or web address]

## 2025-05-07 NOTE — ANESTHESIA PROCEDURE NOTES
Airway  Date/Time: 5/7/2025 12:27 PM  Reason: elective    Airway not difficult    Staffing  Performed: CRNA   Authorized by: Alexy Campoverde MD    Performed by: GOLDEN Vivar-MARVIN  Patient location during procedure: OR    Patient Condition  Indications for airway management: anesthesia  Patient position: sniffing  Planned trial extubation  Sedation level: deep     Final Airway Details   Preoxygenated: yes  Final airway type: endotracheal airway  Successful airway: ETT  Cuffed: yes   Successful intubation technique: video laryngoscopy  Adjuncts used in placement: intubating stylet  Endotracheal tube insertion site: oral  Blade size: #3  ETT size (mm): 8.5  Cormack-Lehane Classification: grade I - full view of glottis  Placement verified by: bronchoscopy and capnometry   Measured from: teeth  ETT to teeth (cm): 20  Number of attempts at approach: 1

## 2025-05-07 NOTE — DISCHARGE INSTRUCTIONS
The anesthetics, sedatives or narcotics which were given to you today will be acting in your body for the next 24 hours, so you might feel a little sleepy or groggy. This feeling should slowly wear off.   Carefully read and follow the instructions below:   You received sedation today.   Do not drive or operate machinery or power tools of any kind.   No alcoholic beverages today, not even beer or wine.   No over the counter medications that contain alcohol or may cause drowsiness.   Do not make important decisions or sign legal documents.     Do not use Aspirin containing products or non-steroidal medications for the next 24 hours.  (Examples of these types of medications include: Advil, Aleve, Ecotrin, Ibuprofen, Motrin or Naprosyn.  This list is not all-inclusive.  Check with your physician or pharmacist before resuming these medications.)  Tylenol, cough medicine, cough drops or throat lozenges may be used when you are allowed to resume eating and drinking.     Call your physician if any of these symptoms occur:   High fever over 101 degrees or chills (a low grade fever is common for 24 hours)   Rash or hives   Persistent nausea or vomiting   Inability to urinate within 8 hours after the procedure  Go directly to the emergency room if you notice any of the following:   Shortness of breath   Chest pain  Coughing up large amounts of bright red blood greater than a teaspoonful of blood clots (about a teaspoonful for the next 24-48 hours is normal, especially if you had a biopsy)  Resume all normal medications unless directed otherwise by your doctor.     Follow up with your referring physician as previously scheduled.    If you experience any problems or have any questions following discharge, please call:   Before 5 pm: [phone]   After 5pm and on weekends: [phone] / [phone] and ask for the Pulmonary Fellow on-call (Pager Number: [phone])

## 2025-05-08 LAB
ACID FAST STN SPEC: NORMAL
MYCOBACTERIUM SPEC CULT: NORMAL
PATH REVIEW-CELL CT,FLUID: NORMAL

## 2025-05-08 ASSESSMENT — PAIN SCALES - GENERAL: PAINLEVEL_OUTOF10: 0 - NO PAIN

## 2025-05-08 NOTE — ANESTHESIA POSTPROCEDURE EVALUATION
Patient: Edmond Cool    Procedure Summary       Date: 05/07/25 Room / Location: [facility]    Anesthesia Start: 1217 Anesthesia Stop: 1336    Procedure: BRONCHOSCOPY Diagnosis: Lung infiltrate on CT    Scheduled Providers: Ankur Byrne MD; Alexy Campoverde MD; GOLDEN Vivar-CRNA Responsible Provider: Alexy Campoverde MD    Anesthesia Type: general ASA Status: 2            Anesthesia Type: general    Vitals Value Taken Time   /79 05/07/25 14:29   Temp 36.4 °C (97.5 °F) 05/07/25 14:29   Pulse 80 05/07/25 14:29   Resp 21 05/07/25 14:29   SpO2 95 % 05/07/25 14:28   Vitals shown include unfiled device data.    Anesthesia Post Evaluation    Patient location during evaluation: PACU  Patient participation: complete - patient participated  Level of consciousness: awake and alert  Pain management: adequate  Airway patency: patent  Cardiovascular status: acceptable and hemodynamically stable  Respiratory status: acceptable, spontaneous ventilation and nonlabored ventilation  Hydration status: acceptable  Postoperative Nausea and Vomiting: none        There were no known notable events for this encounter.

## 2025-05-09 LAB
BACTERIA SPEC RESP CULT: NORMAL
FUNGUS SPEC CULT: NORMAL
FUNGUS SPEC FUNGUS STN: NORMAL
GRAM STN SPEC: NORMAL
GRAM STN SPEC: NORMAL

## 2025-05-14 LAB
ACID FAST STN SPEC: NORMAL
MYCOBACTERIUM SPEC CULT: NORMAL

## 2025-05-16 LAB
LABORATORY COMMENT REPORT: NORMAL
PATH REPORT.ADDENDUM SPEC: NORMAL
PATH REPORT.FINAL DX SPEC: NORMAL
PATH REPORT.GROSS SPEC: NORMAL
PATH REPORT.RELEVANT HX SPEC: NORMAL
PATH REPORT.TOTAL CANCER: NORMAL

## 2025-05-18 LAB
FUNGUS SPEC CULT: NORMAL
FUNGUS SPEC FUNGUS STN: NORMAL

## 2025-05-19 ENCOUNTER — APPOINTMENT (OUTPATIENT)
Dept: ENDOCRINOLOGY | Facility: CLINIC | Age: 55
End: 2025-05-19
Payer: COMMERCIAL

## 2025-05-21 DIAGNOSIS — K21.9 GASTROESOPHAGEAL REFLUX DISEASE WITHOUT ESOPHAGITIS: ICD-10-CM

## 2025-05-21 LAB
ACID FAST STN SPEC: NORMAL
LAB AP ASR DISCLAIMER: NORMAL
LABORATORY COMMENT REPORT: NORMAL
LABORATORY COMMENT REPORT: NORMAL
MYCOBACTERIUM SPEC CULT: NORMAL
PATH REPORT.FINAL DX SPEC: NORMAL
PATH REPORT.GROSS SPEC: NORMAL
PATH REPORT.INTRAOP OBS SPEC DOC: NORMAL
PATH REPORT.TOTAL CANCER: NORMAL

## 2025-05-21 PROCEDURE — RXMED WILLOW AMBULATORY MEDICATION CHARGE

## 2025-05-21 RX ORDER — PANTOPRAZOLE SODIUM 40 MG/1
40 TABLET, DELAYED RELEASE ORAL DAILY
Qty: 90 TABLET | Refills: 0 | Status: SHIPPED | OUTPATIENT
Start: 2025-05-21 | End: 2026-05-21

## 2025-05-23 ENCOUNTER — PHARMACY VISIT (OUTPATIENT)
Dept: PHARMACY | Facility: CLINIC | Age: 55
End: 2025-05-23
Payer: COMMERCIAL

## 2025-05-23 PROCEDURE — RXMED WILLOW AMBULATORY MEDICATION CHARGE

## 2025-05-24 LAB
EST. AVERAGE GLUCOSE BLD GHB EST-MCNC: 143 MG/DL
EST. AVERAGE GLUCOSE BLD GHB EST-SCNC: 7.9 MMOL/L
HBA1C MFR BLD: 6.6 %

## 2025-05-27 LAB
FUNGUS SPEC CULT: NORMAL
FUNGUS SPEC FUNGUS STN: NORMAL

## 2025-05-27 PROCEDURE — RXMED WILLOW AMBULATORY MEDICATION CHARGE

## 2025-05-28 LAB
ACID FAST STN SPEC: NORMAL
MYCOBACTERIUM SPEC CULT: NORMAL

## 2025-05-29 ENCOUNTER — PATIENT MESSAGE (OUTPATIENT)
Dept: PULMONOLOGY | Facility: CLINIC | Age: 55
End: 2025-05-29
Payer: COMMERCIAL

## 2025-05-29 NOTE — PROGRESS NOTES
Bronch results     BAL 28% N  CD4/CD8       LUNG, RIGHT UPPER LOBE, TRANSBRONCHIAL BIOPSY:  - Lung parenchyma and bronchial wall tissue with mild parenchymal chronic inflammation. See note.  - See concurrent cytology specimen [id number].     Note: Multiple deeper levels are reviewed. No definitive evidence of a mass lesion or granuloma are identified. GMS will be ordered and reported as an addendum. Correlation with clinical and radiological findings is recommended to determine in the parenchymal cellular inflammatory infiltrate is representative of the targeted lesion. Re-biopsy as clinically warranted.     B. LYMPH NODE 11 L PULMONARY FINE NEEDLE ASPIRATION, 2 DIRECT SMEARS AND CELL BLOCK:  --Scant lymphocytes and aggregates of histiocytes consistent with granulomatous inflammation, see comment.  --Negative for epithelial malignancy.     Comment: The specimen contains scant lymphocytes and aggregates of histiocytes with associated fibrosis consistent with granulomas.  Crush artifact is present.  Detached benign bronchial epithelial cells are present.  The immunostain for keratin AE1/AE3 highlights the bronchial epithelial cells and is negative in the granulomatous component.  The immunostain for CD68 is positive and the granulomatous component.  The immunostain for CD3 highlights T cells and immunostain for CD20 highlights B cells. The silver special stain (GMS) and stain for acid-fast bacilli (AFB) are negative for fungal organisms and acid-fast bacilli, respectively. Overall, the findings are consistent with granulomatous inflammation.  The differential diagnosis would include infectious granulomatous inflammation, sarcoidosis, and other causes of granulomatous inflammation.  Clinical correlation is necessary.     C. LYMPH NODE 7 PULMONARY FINE NEEDLE ASPIRATION, 2 DIRECT SMEARS AND CELL BLOCK:  --Lymphocytes and aggregates of histiocytes consistent with granulomatous inflammation, see comment.  --Negative for  epithelial malignancy.

## 2025-05-30 ENCOUNTER — APPOINTMENT (OUTPATIENT)
Dept: PULMONOLOGY | Facility: CLINIC | Age: 55
End: 2025-05-30
Payer: COMMERCIAL

## 2025-05-30 ENCOUNTER — TELEPHONE (OUTPATIENT)
Dept: PULMONOLOGY | Facility: HOSPITAL | Age: 55
End: 2025-05-30
Payer: COMMERCIAL

## 2025-05-30 DIAGNOSIS — J45.21 MILD INTERMITTENT ASTHMATIC BRONCHITIS WITH ACUTE EXACERBATION (HHS-HCC): Primary | ICD-10-CM

## 2025-05-30 PROCEDURE — RXMED WILLOW AMBULATORY MEDICATION CHARGE

## 2025-05-30 RX ORDER — PREDNISONE 10 MG/1
TABLET ORAL
Qty: 177 TABLET | Refills: 0 | Status: SHIPPED | OUTPATIENT
Start: 2025-05-30 | End: 2025-09-21

## 2025-05-30 NOTE — TELEPHONE ENCOUNTER
At the gym treadmill for one mile and bike for 2 to 3 miles ; 4 days a week  Jonas Hsu   Using the breathing machine a lot more because of pollen and   Last steroids burst in March 2025  Had mold  where she lived about 17 years but moved ut it 3 years ago no mold, mildew   Organic HP antigen: No chickens, birds, parrots, parakeets, pigeons. No feather bedding. No farming, woodworking, no regular use of a Hot tub or a sauna. No mold or mildew in the new house. No Handling vegetable, grain or animal matter          Bronch results      BAL 28% L, 72% Macrophages   CD4/CD8 pending        LUNG, RIGHT UPPER LOBE, TRANSBRONCHIAL BIOPSY:  - Lung parenchyma and bronchial wall tissue with mild parenchymal chronic inflammation. See note.  - See concurrent cytology specimen [id number].     Note: Multiple deeper levels are reviewed. No definitive evidence of a mass lesion or granuloma are identified. GMS will be ordered and reported as an addendum. Correlation with clinical and radiological findings is recommended to determine in the parenchymal cellular inflammatory infiltrate is representative of the targeted lesion. Re-biopsy as clinically warranted.      B. LYMPH NODE 11 L PULMONARY FINE NEEDLE ASPIRATION, 2 DIRECT SMEARS AND CELL BLOCK:  --Scant lymphocytes and aggregates of histiocytes consistent with granulomatous inflammation, see comment.  --Negative for epithelial malignancy.     Comment: The specimen contains scant lymphocytes and aggregates of histiocytes with associated fibrosis consistent with granulomas.  Crush artifact is present.  Detached benign bronchial epithelial cells are present.  The immunostain for keratin AE1/AE3 highlights the bronchial epithelial cells and is negative in the granulomatous component.  The immunostain for CD68 is positive and the granulomatous component.  The immunostain for CD3 highlights T cells and immunostain for CD20 highlights B cells. The silver special stain (GMS) and  stain for acid-fast bacilli (AFB) are negative for fungal organisms and acid-fast bacilli, respectively. Overall, the findings are consistent with granulomatous inflammation.  The differential diagnosis would include infectious granulomatous inflammation, sarcoidosis, and other causes of granulomatous inflammation.  Clinical correlation is necessary.     C. LYMPH NODE 7 PULMONARY FINE NEEDLE ASPIRATION, 2 DIRECT SMEARS AND CELL BLOCK:  --Lymphocytes and aggregates of histiocytes consistent with granulomatous inflammation, see comment.  --Negative for epithelial malignancy.    Impression  The radiographic parenchymal involvement is suggestive of HP, although LN are not common in HP. ? Sarcoidosis given granuloma n LN biopsy    Progression clinically , by PFT and CT     Plan:  Start Prednisone at 20 mg per day for 6 weeks then 15 for 6 weeks then imaging and PFT and then go down to 10mg per day    Continue trelegy 200

## 2025-05-31 ENCOUNTER — PHARMACY VISIT (OUTPATIENT)
Dept: PHARMACY | Facility: CLINIC | Age: 55
End: 2025-05-31
Payer: COMMERCIAL

## 2025-06-03 DIAGNOSIS — E11.65 UNCONTROLLED TYPE 2 DIABETES MELLITUS WITH HYPERGLYCEMIA, WITHOUT LONG-TERM CURRENT USE OF INSULIN: Primary | ICD-10-CM

## 2025-06-03 PROCEDURE — RXMED WILLOW AMBULATORY MEDICATION CHARGE

## 2025-06-03 RX ORDER — SEMAGLUTIDE 2.68 MG/ML
2 INJECTION, SOLUTION SUBCUTANEOUS
Qty: 3 ML | Refills: 11 | Status: SHIPPED | OUTPATIENT
Start: 2025-06-03

## 2025-06-04 LAB
ACID FAST STN SPEC: NORMAL
MYCOBACTERIUM SPEC CULT: NORMAL

## 2025-06-06 ENCOUNTER — PHARMACY VISIT (OUTPATIENT)
Dept: PHARMACY | Facility: CLINIC | Age: 55
End: 2025-06-06
Payer: MEDICARE

## 2025-06-11 LAB
ACID FAST STN SPEC: NORMAL
MYCOBACTERIUM SPEC CULT: NORMAL

## 2025-06-16 ENCOUNTER — TELEPHONE (OUTPATIENT)
Dept: PULMONOLOGY | Facility: HOSPITAL | Age: 55
End: 2025-06-16
Payer: COMMERCIAL

## 2025-06-18 ENCOUNTER — TELEMEDICINE (OUTPATIENT)
Dept: PULMONOLOGY | Facility: CLINIC | Age: 55
End: 2025-06-18
Payer: COMMERCIAL

## 2025-06-18 DIAGNOSIS — J45.50 SEVERE PERSISTENT ASTHMA, UNSPECIFIED WHETHER COMPLICATED (MULTI): ICD-10-CM

## 2025-06-18 PROCEDURE — 99215 OFFICE O/P EST HI 40 MIN: CPT | Performed by: INTERNAL MEDICINE

## 2025-06-18 PROCEDURE — 3048F LDL-C <100 MG/DL: CPT | Performed by: INTERNAL MEDICINE

## 2025-06-18 PROCEDURE — 3044F HG A1C LEVEL LT 7.0%: CPT | Performed by: INTERNAL MEDICINE

## 2025-06-18 NOTE — PATIENT INSTRUCTIONS
Dear Edmond Cool It was nice seeing you today on video. We discussed the following:     You are doing really well with the prednisone and you are able to exercise and your cough is well-controlled.  Finish one month of prednisone 20 then go down to 15 mg until we see each other the beginning of September  Please complete your chest CT and breathing test end of August   Complete your blood test before next visit     For scheduling purposes:    Call [phone] to schedule a breathing or a walking test     Call [phone] to schedule  EKG's, Echocardiograms and Cardiopulmonary Stress Tests.    Call [phone] to schedule Radiology tests such as Nuclear Medicine Stress Tests, CT Scans, and MRI's.    Should you have any questions Please Call my assistant Sally Clayton at [phone] or our pulmonary nurse Dariela Wen [phone].

## 2025-06-18 NOTE — PROGRESS NOTES
Interval 6/18/2025    Virtual or Telephone Consent    An interactive audio and video telecommunication system which permits real time communications between the patient (at the originating site) and provider (at the distant site) was utilized to provide this telehealth service.   MATT sanchez consent was requested and obtained from Edmond Cool on this date, 06/18/25 for a telehealth visit and the patient's location was confirmed at the time of the visit.    He is feeling great.  She started the prednisone at 20 mg daily beginning of Tylenol.  June.  No significant side effects from it.  She is exercising a lot more and goes on  walks between 1-1/2 to 2 miles 3-4 times per week.  She also does weight training over the weekend.        Telephone 05/30/2025    At the gym treadmill for one mile and bike for 2 to 3 miles ; 4 days a week  Trelegy   Montelukast   Using the breathing machine a lot more because of pollen   Last steroids burst in March 2025  Had mold  where she lived about 17 years but moved out it 3 years ago no mold, mildew   Organic HP antigen: No chickens, birds, parrots, parakeets, pigeons. No feather bedding. No farming, woodworking, no regular use of a Hot tub or a sauna. No mold or mildew in the new house. No Handling vegetable, grain or animal matter     Diag testing   PFT last 2021 ratio normal.  FEV1 1.77.  FVC 2.05, DLCO 16 pos BDR in the small airways no obstruction.     PFT 06 2024 ratio normal.  FEV1 1.91, FVC 2.2, DLCO 15.  No response to bronchodilator.  Feno 9    PFT 04/2025 ratio normal FEV1 post BD 1.75 (85%)  FVC 2.02 (79%) DLCO 14.4 (68%)      IgE 500 2022, 347 2023, neg aspergillus iGe and Igg  Eosinos 300   Eosinophils Absolute (x10E9/L)   Date Value   12/09/2022 0.30   03/11/2022 0.20   12/29/2020 0.17     IgE (IU/mL)   Date Value   03/16/2023 347 (H)     Aspergillus ige negative     Imaging   === 05/30/23 ===    CT CHEST WO IV CONTRAST    - Impression -  1. No significant  interval change in the lungs compared to CT  03/13/2023, as described above. Findings may represent sequela of  prior infectious/inflammatory process such as reported recent COVID  pneumonia requiring hospitalization. Small airways disease and  atypical infectious processes remain differential considerations. A  component of organizing pneumonia is again not excluded.  2. Stable borderline and mildly enlarged mediastinal and upper  abdominal lymph nodes, likely reactive.  3. Small sliding hiatal hernia.  4. Stable 3 mm nodule in the right upper lobe.    I personally reviewed the images/study and I agree with the findings as stated by resident physician Dr. Daniel Rivas.    Chest CT 06 2024   1.Redemonstrated bilateral bandlike opacities and reticulations with associated subtle ground-glass opacities bilaterally. Interval slight worsening patchy ground-glass opacities within the right upper lobe.  Findings again may represent sequelae of COVID with possible organizing pneumonia. Superimposed atypical infectious/inflammatory process not excluded.  2. Similar appearance of mildly enlarged mediastinal lymph nodes,likely reactive in nature.  3. Additional stable chronic findings as described above.    Sinus CT  CT 06/2024  wnl        CT ABDOMEN PELVIS W IV CONTRAST=== 11/27/24 ===    - Impression -  Basilar parenchymal opacities and parenchymal bands.  Please correlate  for component of pneumonia.  Enteritis.  Sigmoid colon diverticulosis without evidence of diverticulitis.  Small hiatal hernia.  Small hypodensity within the uterus suggests uterine fibroid.    CT CHEST HIGH RESOLUTION=== 04/15/25 ===    - Impression -  1. There has been interval increase in upper lobe predominantly  peribronchial ground-glass opacities with progression in mediastinal  lymphadenopathy.  2. Inflammatory/infiltrative process such as sarcoidosis or /BOOP  can be considered. Unusual etiologies such as bronchocentric  granulomatosis or a  pulmonary histiocytosis can be considered.  3. Consider mediastinal aida or parenchymal tissue sampling.  4. Expiratory imaging suggest diffuse air trapping. Correlate with  PFTs.  5. Elevation of the right hemidiaphragm may represent eventration but  correlate with any concern for diaphragmatic dysfunction.    Bronch results      BAL 28% L, 72% Macrophages   CD4/CD8 pending        LUNG, RIGHT UPPER LOBE, TRANSBRONCHIAL BIOPSY:  - Lung parenchyma and bronchial wall tissue with mild parenchymal chronic inflammation. See note.  - See concurrent cytology specimen [id number].     Note: Multiple deeper levels are reviewed. No definitive evidence of a mass lesion or granuloma are identified. GMS will be ordered and reported as an addendum. Correlation with clinical and radiological findings is recommended to determine in the parenchymal cellular inflammatory infiltrate is representative of the targeted lesion. Re-biopsy as clinically warranted.      B. LYMPH NODE 11 L PULMONARY FINE NEEDLE ASPIRATION, 2 DIRECT SMEARS AND CELL BLOCK:  --Scant lymphocytes and aggregates of histiocytes consistent with granulomatous inflammation, see comment.  --Negative for epithelial malignancy.     Comment: The specimen contains scant lymphocytes and aggregates of histiocytes with associated fibrosis consistent with granulomas.  Crush artifact is present.  Detached benign bronchial epithelial cells are present.  The immunostain for keratin AE1/AE3 highlights the bronchial epithelial cells and is negative in the granulomatous component.  The immunostain for CD68 is positive and the granulomatous component.  The immunostain for CD3 highlights T cells and immunostain for CD20 highlights B cells. The silver special stain (GMS) and stain for acid-fast bacilli (AFB) are negative for fungal organisms and acid-fast bacilli, respectively. Overall, the findings are consistent with granulomatous inflammation.  The differential diagnosis would include  infectious granulomatous inflammation, sarcoidosis, and other causes of granulomatous inflammation.  Clinical correlation is necessary.     C. LYMPH NODE 7 PULMONARY FINE NEEDLE ASPIRATION, 2 DIRECT SMEARS AND CELL BLOCK:  --Lymphocytes and aggregates of histiocytes consistent with granulomatous inflammation, see comment.  --Negative for epithelial malignancy.      Impression and Plan    54-year old AA female (ex smoker, quit 22 years, <5 pack years), h/o asthma (since age 25), htn, hypercholesteremia, here for evaluation of asthma symptoms that have been uncontrolled of late.  Patient experience shortness of breath and cough constantly. She had covid about 2 years ago had to be hospitalized for 1 day.  Has been on steroids on and off. She has partial relief from the steroids     1) Severe persistent asthma - Ige levels >500 -->323, eosi 150-300, FeNO  9 and 5 but on prednisone. ANCA negative, Aspergillus IgE and igG negative   Continue albuterol nebs twice daily and up to 4 times as needed.  Trelegy   Continue montelukast  Dupilumab Jan-June 2024 no relief, Fasenra June 2024-July no relief.  Not interested in more Biologics  Patient to follow up with her allergist           2) Bilateral interstitial infiltrates/GGO and LN- CT scan of the chest 05 2023 showed bilateral mild bronchial wall thickening and mucus plugging, minimal air trapping 3 mm RUL lung nodule. Patchy lobular bandlike areas of opacity in bilateral lungs.Repeat CT 06/2024 shows persistent patchy infiltrates, likely residual from prior COVID.? Slighly worse in 06 2024. (Aspergillus IgE and IgG negative), and worse again on most recent CT from April 2025, DD is sarcoidosis vs HP vs GLILD?-- we will proceed with bronch, Immunoglobulin and IgG subclasses will be sent     The radiographic parenchymal involvement is suggestive of HP, although LN are not common in HP. ? Sarcoidosis given granuloma on LN biopsy. CD4/CD8 pending. HP panel pending (prior  aspergillus IgG negative) but history of mold exposure for a long time until 3 years ago. Also sent Igg subclasses     Progression clinically , by PFT and CT   Started Prednisone at 20 mg per day beginning of June -- great clinical improvement. Finish one month of 20 and then go down to 15 for 8-10 weeks then imaging and PFT and then go down to 10mg per day    Continue trelegy 200   HP panel and IgG and subclasses      3) Allergic rhinitis and post nasal drip  - Continue Flonase 1 sq in each nostril twice daily  - Continue to follow up with allergy.

## 2025-06-19 LAB
ACID FAST STN SPEC: NORMAL
MYCOBACTERIUM SPEC CULT: NORMAL

## 2025-06-23 DIAGNOSIS — E78.5 HYPERLIPIDEMIA DUE TO TYPE 2 DIABETES MELLITUS (MULTI): ICD-10-CM

## 2025-06-23 DIAGNOSIS — E11.69 HYPERLIPIDEMIA DUE TO TYPE 2 DIABETES MELLITUS (MULTI): ICD-10-CM

## 2025-06-23 PROCEDURE — RXMED WILLOW AMBULATORY MEDICATION CHARGE

## 2025-06-23 RX ORDER — ROSUVASTATIN CALCIUM 20 MG/1
20 TABLET, COATED ORAL DAILY
Qty: 90 TABLET | Refills: 3 | Status: SHIPPED | OUTPATIENT
Start: 2025-06-23

## 2025-06-25 LAB
ACID FAST STN SPEC: NORMAL
MYCOBACTERIUM SPEC CULT: NORMAL

## 2025-06-27 ENCOUNTER — TELEPHONE (OUTPATIENT)
Dept: PULMONOLOGY | Facility: HOSPITAL | Age: 55
End: 2025-06-27
Payer: COMMERCIAL

## 2025-06-27 DIAGNOSIS — J45.50 SEVERE PERSISTENT ASTHMA, UNSPECIFIED WHETHER COMPLICATED (MULTI): ICD-10-CM

## 2025-06-27 PROCEDURE — RXMED WILLOW AMBULATORY MEDICATION CHARGE

## 2025-06-27 RX ORDER — PREDNISONE 5 MG/1
15 TABLET ORAL DAILY
Qty: 90 TABLET | Refills: 0 | Status: SHIPPED | OUTPATIENT
Start: 2025-06-27

## 2025-06-27 NOTE — TELEPHONE ENCOUNTER
Pt reached out needing a refill on her prednisone due to the dosage change. Refill order placed and routed to Dr. Mcclendon to sign.

## 2025-06-28 ENCOUNTER — PHARMACY VISIT (OUTPATIENT)
Dept: PHARMACY | Facility: CLINIC | Age: 55
End: 2025-06-28
Payer: MEDICARE

## 2025-06-28 LAB
A FUMIGATUS AB SER QL ID: NEGATIVE
PIGEON SERUM AB QL ID: NEGATIVE
S RECTIVIRGULA AB SER QL ID: NEGATIVE
S VIRIDIS AB SER QL ID: NEGATIVE
T CANDIDUS AB SER QL: NEGATIVE
T VULGARIS AB SER QL ID: NEGATIVE

## 2025-07-03 PROCEDURE — RXMED WILLOW AMBULATORY MEDICATION CHARGE

## 2025-07-18 ENCOUNTER — PHARMACY VISIT (OUTPATIENT)
Dept: PHARMACY | Facility: CLINIC | Age: 55
End: 2025-07-18
Payer: MEDICARE

## 2025-07-25 ENCOUNTER — HOSPITAL ENCOUNTER (OUTPATIENT)
Dept: RESPIRATORY THERAPY | Facility: HOSPITAL | Age: 55
Discharge: HOME | End: 2025-07-25
Payer: COMMERCIAL

## 2025-07-25 DIAGNOSIS — J45.50 SEVERE PERSISTENT ASTHMA, UNSPECIFIED WHETHER COMPLICATED (MULTI): ICD-10-CM

## 2025-07-25 PROCEDURE — 94729 DIFFUSING CAPACITY: CPT | Performed by: INTERNAL MEDICINE

## 2025-07-25 PROCEDURE — 94010 BREATHING CAPACITY TEST: CPT

## 2025-07-25 PROCEDURE — 94010 BREATHING CAPACITY TEST: CPT | Performed by: INTERNAL MEDICINE

## 2025-08-01 ENCOUNTER — APPOINTMENT (OUTPATIENT)
Dept: PRIMARY CARE | Facility: CLINIC | Age: 55
End: 2025-08-01
Payer: COMMERCIAL

## 2025-08-01 VITALS
HEIGHT: 62 IN | HEART RATE: 83 BPM | BODY MASS INDEX: 31.5 KG/M2 | SYSTOLIC BLOOD PRESSURE: 132 MMHG | WEIGHT: 171.2 LBS | DIASTOLIC BLOOD PRESSURE: 80 MMHG

## 2025-08-01 DIAGNOSIS — E08.00 DIABETES MELLITUS DUE TO UNDERLYING CONDITION WITH HYPEROSMOLARITY WITHOUT COMA, WITHOUT LONG-TERM CURRENT USE OF INSULIN: ICD-10-CM

## 2025-08-01 DIAGNOSIS — E55.9 VITAMIN D DEFICIENCY: ICD-10-CM

## 2025-08-01 DIAGNOSIS — J45.41 MODERATE PERSISTENT ASTHMATIC BRONCHITIS WITH ACUTE EXACERBATION (HHS-HCC): ICD-10-CM

## 2025-08-01 DIAGNOSIS — E55.9 VITAMIN D INSUFFICIENCY: ICD-10-CM

## 2025-08-01 DIAGNOSIS — I10 BENIGN ESSENTIAL HYPERTENSION: ICD-10-CM

## 2025-08-01 DIAGNOSIS — Z00.00 WELL ADULT HEALTH CHECK: ICD-10-CM

## 2025-08-01 DIAGNOSIS — E11.9 TYPE 2 DIABETES MELLITUS WITHOUT COMPLICATION, WITHOUT LONG-TERM CURRENT USE OF INSULIN: ICD-10-CM

## 2025-08-01 DIAGNOSIS — F41.9 ANXIETY: ICD-10-CM

## 2025-08-01 DIAGNOSIS — E78.49 ESSENTIAL FAMILIAL HYPERLIPIDEMIA: ICD-10-CM

## 2025-08-01 DIAGNOSIS — E53.8 B12 DEFICIENCY: Primary | ICD-10-CM

## 2025-08-01 DIAGNOSIS — K21.9 GASTROESOPHAGEAL REFLUX DISEASE WITHOUT ESOPHAGITIS: ICD-10-CM

## 2025-08-01 PROCEDURE — 3075F SYST BP GE 130 - 139MM HG: CPT | Performed by: FAMILY MEDICINE

## 2025-08-01 PROCEDURE — 99396 PREV VISIT EST AGE 40-64: CPT | Performed by: FAMILY MEDICINE

## 2025-08-01 PROCEDURE — 3079F DIAST BP 80-89 MM HG: CPT | Performed by: FAMILY MEDICINE

## 2025-08-01 PROCEDURE — 3008F BODY MASS INDEX DOCD: CPT | Performed by: FAMILY MEDICINE

## 2025-08-01 RX ORDER — AMLODIPINE BESYLATE 5 MG/1
5 TABLET ORAL DAILY
Qty: 90 TABLET | Refills: 1 | Status: SHIPPED | OUTPATIENT
Start: 2025-08-01 | End: 2026-01-28

## 2025-08-01 RX ORDER — PANTOPRAZOLE SODIUM 40 MG/1
40 TABLET, DELAYED RELEASE ORAL DAILY
Qty: 90 TABLET | Refills: 1 | Status: SHIPPED | OUTPATIENT
Start: 2025-08-01 | End: 2026-08-01

## 2025-08-01 ASSESSMENT — ENCOUNTER SYMPTOMS
MUSCULOSKELETAL NEGATIVE: 1
GASTROINTESTINAL NEGATIVE: 1
LOSS OF SENSATION IN FEET: 0
OCCASIONAL FEELINGS OF UNSTEADINESS: 0
NEUROLOGICAL NEGATIVE: 1
CARDIOVASCULAR NEGATIVE: 1
RESPIRATORY NEGATIVE: 1
DEPRESSION: 0
CONSTITUTIONAL NEGATIVE: 1

## 2025-08-01 NOTE — PROGRESS NOTES
Pt is here for cpe, pt has no concerns. Refill of pantoprazole and amlodipine needed 90 day supply.

## 2025-08-01 NOTE — PROGRESS NOTES
"Subjective   Patient ID: Edmond Cool is a 54 y.o. female who presents for No chief complaint on file..    HPI dm, htn, chol, infalmatory pneumonitis    Review of Systems   Constitutional: Negative.    HENT: Negative.     Respiratory: Negative.     Cardiovascular: Negative.    Gastrointestinal: Negative.    Musculoskeletal: Negative.    Neurological: Negative.        Objective   /80   Pulse 83   Ht 1.575 m (5' 2\")   Wt 77.7 kg (171 lb 3.2 oz)   BMI 31.31 kg/m²     Physical Exam  Vitals reviewed.   Constitutional:       Appearance: Normal appearance. She is normal weight.     Eyes:      Extraocular Movements: Extraocular movements intact.      Conjunctiva/sclera: Conjunctivae normal.      Pupils: Pupils are equal, round, and reactive to light.       Cardiovascular:      Rate and Rhythm: Normal rate and regular rhythm.      Pulses: Normal pulses.      Heart sounds: Normal heart sounds.   Pulmonary:      Effort: Pulmonary effort is normal.      Breath sounds: Normal breath sounds.   Abdominal:      General: Bowel sounds are normal.      Palpations: Abdomen is soft.     Musculoskeletal:         General: Normal range of motion.     Skin:     General: Skin is warm and dry.     Neurological:      General: No focal deficit present.      Mental Status: She is alert and oriented to person, place, and time. Mental status is at baseline.         Assessment/Plan   Problem List Items Addressed This Visit           ICD-10-CM    Anxiety F41.9    Asthmatic bronchitis with exacerbation ([facility]) J45.901    Benign essential hypertension I10    Relevant Medications    amLODIPine (Norvasc) 5 mg tablet    Essential familial hyperlipidemia E78.49    Type 2 diabetes mellitus without complications E11.9     Other Visit Diagnoses         Codes      B12 deficiency    -  Primary E53.8    Relevant Orders    Vitamin B12      Gastroesophageal reflux disease without esophagitis     K21.9    Relevant Medications    amLODIPine " (Norvasc) 5 mg tablet    pantoprazole (ProtoNix) 40 mg EC tablet    Other Relevant Orders    TSH with reflex to Free T4 if abnormal      Vitamin D deficiency     E55.9    Relevant Orders    Vitamin D 25-Hydroxy,Total (for eval of Vitamin D levels)      Vitamin D insufficiency     E55.9      Well adult health check     Z00.00    Relevant Orders    Comprehensive Metabolic Panel    Lipid Panel

## 2025-08-02 ENCOUNTER — PHARMACY VISIT (OUTPATIENT)
Dept: PHARMACY | Facility: CLINIC | Age: 55
End: 2025-08-02
Payer: MEDICARE

## 2025-08-02 PROCEDURE — RXMED WILLOW AMBULATORY MEDICATION CHARGE

## 2025-08-15 ENCOUNTER — HOSPITAL ENCOUNTER (OUTPATIENT)
Dept: RADIOLOGY | Facility: CLINIC | Age: 55
Discharge: HOME | End: 2025-08-15
Payer: COMMERCIAL

## 2025-08-15 DIAGNOSIS — J45.50 SEVERE PERSISTENT ASTHMA, UNSPECIFIED WHETHER COMPLICATED (MULTI): ICD-10-CM

## 2025-08-15 PROCEDURE — 71250 CT THORAX DX C-: CPT

## 2025-08-16 LAB
25(OH)D3+25(OH)D2 SERPL-MCNC: 68 NG/ML (ref 30–100)
ALBUMIN SERPL-MCNC: 4.7 G/DL (ref 3.6–5.1)
ALP SERPL-CCNC: 110 U/L (ref 37–153)
ALT SERPL-CCNC: 43 U/L (ref 6–29)
ANION GAP SERPL CALCULATED.4IONS-SCNC: 9 MMOL/L (CALC) (ref 7–17)
AST SERPL-CCNC: 29 U/L (ref 10–35)
BILIRUB SERPL-MCNC: 0.4 MG/DL (ref 0.2–1.2)
BUN SERPL-MCNC: 19 MG/DL (ref 7–25)
CALCIUM SERPL-MCNC: 9.7 MG/DL (ref 8.6–10.4)
CHLORIDE SERPL-SCNC: 103 MMOL/L (ref 98–110)
CHOLEST SERPL-MCNC: 153 MG/DL
CHOLEST/HDLC SERPL: 1.7 (CALC)
CO2 SERPL-SCNC: 27 MMOL/L (ref 20–32)
CREAT SERPL-MCNC: 0.62 MG/DL (ref 0.5–1.03)
EGFRCR SERPLBLD CKD-EPI 2021: 106 ML/MIN/1.73M2
GLUCOSE SERPL-MCNC: 170 MG/DL (ref 65–139)
HDLC SERPL-MCNC: 91 MG/DL
LDLC SERPL CALC-MCNC: 49 MG/DL (CALC)
NONHDLC SERPL-MCNC: 62 MG/DL (CALC)
POTASSIUM SERPL-SCNC: 4.7 MMOL/L (ref 3.5–5.3)
PROT SERPL-MCNC: 7.3 G/DL (ref 6.1–8.1)
SODIUM SERPL-SCNC: 139 MMOL/L (ref 135–146)
TRIGL SERPL-MCNC: 52 MG/DL
TSH SERPL-ACNC: 0.71 MIU/L
VIT B12 SERPL-MCNC: 726 PG/ML (ref 200–1100)

## 2025-08-22 ENCOUNTER — PATIENT MESSAGE (OUTPATIENT)
Dept: OBSTETRICS AND GYNECOLOGY | Facility: CLINIC | Age: 55
End: 2025-08-22
Payer: COMMERCIAL

## 2025-08-25 DIAGNOSIS — J45.50 SEVERE PERSISTENT ASTHMA, UNSPECIFIED WHETHER COMPLICATED (MULTI): ICD-10-CM

## 2025-08-25 DIAGNOSIS — J45.21 MILD INTERMITTENT ASTHMATIC BRONCHITIS WITH ACUTE EXACERBATION (HHS-HCC): ICD-10-CM

## 2025-08-25 PROCEDURE — RXMED WILLOW AMBULATORY MEDICATION CHARGE

## 2025-08-25 RX ORDER — FLUTICASONE FUROATE, UMECLIDINIUM BROMIDE AND VILANTEROL TRIFENATATE 200; 62.5; 25 UG/1; UG/1; UG/1
1 POWDER RESPIRATORY (INHALATION) DAILY
Qty: 60 EACH | Refills: 3 | Status: CANCELLED | OUTPATIENT
Start: 2025-08-25

## 2025-09-02 ENCOUNTER — TELEPHONE (OUTPATIENT)
Dept: PULMONOLOGY | Facility: HOSPITAL | Age: 55
End: 2025-09-02

## 2025-09-02 ENCOUNTER — PHARMACY VISIT (OUTPATIENT)
Dept: PHARMACY | Facility: CLINIC | Age: 55
End: 2025-09-02
Payer: MEDICARE

## 2025-09-02 ENCOUNTER — OFFICE VISIT (OUTPATIENT)
Dept: PULMONOLOGY | Facility: CLINIC | Age: 55
End: 2025-09-02
Payer: COMMERCIAL

## 2025-09-02 VITALS
DIASTOLIC BLOOD PRESSURE: 77 MMHG | BODY MASS INDEX: 31.28 KG/M2 | WEIGHT: 170 LBS | SYSTOLIC BLOOD PRESSURE: 133 MMHG | HEART RATE: 83 BPM | OXYGEN SATURATION: 96 % | HEIGHT: 62 IN

## 2025-09-02 DIAGNOSIS — J45.41 MODERATE PERSISTENT ASTHMATIC BRONCHITIS WITH ACUTE EXACERBATION (HHS-HCC): ICD-10-CM

## 2025-09-02 DIAGNOSIS — R05.3 PERSISTENT COUGH: ICD-10-CM

## 2025-09-02 DIAGNOSIS — J01.90 ACUTE SINUSITIS, RECURRENCE NOT SPECIFIED, UNSPECIFIED LOCATION: Primary | ICD-10-CM

## 2025-09-02 PROCEDURE — 3048F LDL-C <100 MG/DL: CPT | Performed by: INTERNAL MEDICINE

## 2025-09-02 PROCEDURE — 3075F SYST BP GE 130 - 139MM HG: CPT | Performed by: INTERNAL MEDICINE

## 2025-09-02 PROCEDURE — 99215 OFFICE O/P EST HI 40 MIN: CPT | Performed by: INTERNAL MEDICINE

## 2025-09-02 PROCEDURE — RXMED WILLOW AMBULATORY MEDICATION CHARGE

## 2025-09-02 PROCEDURE — 1036F TOBACCO NON-USER: CPT | Performed by: INTERNAL MEDICINE

## 2025-09-02 PROCEDURE — 3044F HG A1C LEVEL LT 7.0%: CPT | Performed by: INTERNAL MEDICINE

## 2025-09-02 PROCEDURE — 3008F BODY MASS INDEX DOCD: CPT | Performed by: INTERNAL MEDICINE

## 2025-09-02 PROCEDURE — 3078F DIAST BP <80 MM HG: CPT | Performed by: INTERNAL MEDICINE

## 2025-09-02 PROCEDURE — 99212 OFFICE O/P EST SF 10 MIN: CPT

## 2025-09-02 RX ORDER — AMOXICILLIN AND CLAVULANATE POTASSIUM 875; 125 MG/1; MG/1
1 TABLET, FILM COATED ORAL 2 TIMES DAILY
Qty: 14 TABLET | Refills: 0 | Status: SHIPPED | OUTPATIENT
Start: 2025-09-02

## 2025-09-02 RX ORDER — MONTELUKAST SODIUM 10 MG/1
10 TABLET ORAL NIGHTLY
Qty: 90 TABLET | Refills: 3 | Status: SHIPPED | OUTPATIENT
Start: 2025-09-02 | End: 2025-09-05 | Stop reason: SDUPTHER

## 2025-09-05 ENCOUNTER — TELEPHONE (OUTPATIENT)
Dept: PULMONOLOGY | Facility: HOSPITAL | Age: 55
End: 2025-09-05
Payer: COMMERCIAL

## 2025-09-05 DIAGNOSIS — J45.21 MILD INTERMITTENT ASTHMATIC BRONCHITIS WITH ACUTE EXACERBATION (HHS-HCC): ICD-10-CM

## 2025-09-05 DIAGNOSIS — J45.50 SEVERE PERSISTENT ASTHMA, UNSPECIFIED WHETHER COMPLICATED (MULTI): ICD-10-CM

## 2025-09-05 DIAGNOSIS — J45.41 MODERATE PERSISTENT ASTHMATIC BRONCHITIS WITH ACUTE EXACERBATION (HHS-HCC): ICD-10-CM

## 2025-09-05 DIAGNOSIS — R05.3 PERSISTENT COUGH: ICD-10-CM

## 2025-09-05 PROCEDURE — RXMED WILLOW AMBULATORY MEDICATION CHARGE

## 2025-09-05 RX ORDER — MONTELUKAST SODIUM 10 MG/1
10 TABLET ORAL NIGHTLY
Qty: 90 TABLET | Refills: 3 | Status: SHIPPED | OUTPATIENT
Start: 2025-09-05

## 2025-09-05 RX ORDER — FLUTICASONE FUROATE, UMECLIDINIUM BROMIDE AND VILANTEROL TRIFENATATE 200; 62.5; 25 UG/1; UG/1; UG/1
1 POWDER RESPIRATORY (INHALATION) DAILY
Qty: 60 EACH | Refills: 3 | Status: SHIPPED | OUTPATIENT
Start: 2025-09-05

## 2025-09-06 ENCOUNTER — PHARMACY VISIT (OUTPATIENT)
Dept: PHARMACY | Facility: CLINIC | Age: 55
End: 2025-09-06
Payer: MEDICARE

## 2025-09-22 ENCOUNTER — APPOINTMENT (OUTPATIENT)
Dept: ENDOCRINOLOGY | Facility: CLINIC | Age: 55
End: 2025-09-22
Payer: COMMERCIAL

## 2025-12-15 ENCOUNTER — APPOINTMENT (OUTPATIENT)
Dept: ENDOCRINOLOGY | Facility: CLINIC | Age: 55
End: 2025-12-15
Payer: COMMERCIAL